# Patient Record
Sex: FEMALE | Race: WHITE | Employment: OTHER | URBAN - METROPOLITAN AREA
[De-identification: names, ages, dates, MRNs, and addresses within clinical notes are randomized per-mention and may not be internally consistent; named-entity substitution may affect disease eponyms.]

---

## 2017-02-14 RX ORDER — TRAZODONE HYDROCHLORIDE 150 MG/1
TABLET ORAL
Qty: 90 TAB | Refills: 1 | Status: SHIPPED | OUTPATIENT
Start: 2017-02-14 | End: 2017-08-13 | Stop reason: SDUPTHER

## 2017-03-30 RX ORDER — ATORVASTATIN CALCIUM 10 MG/1
TABLET, FILM COATED ORAL
Qty: 90 TAB | Refills: 0 | Status: SHIPPED | OUTPATIENT
Start: 2017-03-30 | End: 2017-06-28 | Stop reason: SDUPTHER

## 2017-05-10 ENCOUNTER — OFFICE VISIT (OUTPATIENT)
Dept: INTERNAL MEDICINE CLINIC | Age: 67
End: 2017-05-10

## 2017-05-10 VITALS
DIASTOLIC BLOOD PRESSURE: 82 MMHG | OXYGEN SATURATION: 98 % | WEIGHT: 130.6 LBS | BODY MASS INDEX: 24.03 KG/M2 | HEIGHT: 62 IN | RESPIRATION RATE: 16 BRPM | SYSTOLIC BLOOD PRESSURE: 128 MMHG | TEMPERATURE: 98.6 F | HEART RATE: 68 BPM

## 2017-05-10 DIAGNOSIS — L25.9 CONTACT DERMATITIS, UNSPECIFIED CONTACT DERMATITIS TYPE, UNSPECIFIED TRIGGER: ICD-10-CM

## 2017-05-10 DIAGNOSIS — F41.0 ANXIETY ATTACK: ICD-10-CM

## 2017-05-10 DIAGNOSIS — L25.9 CONTACT DERMATITIS, UNSPECIFIED CONTACT DERMATITIS TYPE, UNSPECIFIED TRIGGER: Primary | ICD-10-CM

## 2017-05-10 RX ORDER — CLOTRIMAZOLE AND BETAMETHASONE DIPROPIONATE 10; .64 MG/G; MG/G
CREAM TOPICAL 2 TIMES DAILY
Qty: 15 G | Refills: 0 | Status: SHIPPED | OUTPATIENT
Start: 2017-05-10 | End: 2017-07-19 | Stop reason: ALTCHOICE

## 2017-05-10 RX ORDER — CLOTRIMAZOLE AND BETAMETHASONE DIPROPIONATE 10; .64 MG/G; MG/G
CREAM TOPICAL 2 TIMES DAILY
Qty: 15 G | Refills: 0 | Status: SHIPPED | OUTPATIENT
Start: 2017-05-10 | End: 2017-05-10 | Stop reason: SDUPTHER

## 2017-05-10 RX ORDER — ALPRAZOLAM 0.5 MG/1
0.5 TABLET ORAL
Qty: 15 TAB | Refills: 0 | Status: SHIPPED | OUTPATIENT
Start: 2017-05-10 | End: 2018-12-18

## 2017-05-10 NOTE — MR AVS SNAPSHOT
Visit Information Date & Time Provider Department Dept. Phone Encounter #  
 5/10/2017  2:30 PM Ness Manuel MD [facility] Medicine [phone] [id number] Upcoming Health Maintenance Date Due  
 GLAUCOMA SCREENING Q2Y 1/4/2017 Pneumococcal 65+ Low/Medium Risk (1 of 2 - PCV13) 1/4/2017 MEDICARE YEARLY EXAM 1/4/2017 INFLUENZA AGE 9 TO ADULT 8/1/2017 BREAST CANCER SCRN MAMMOGRAM 7/15/2018 DTaP/Tdap/Td series (2 - Td) 7/24/2023 Allergies as of 5/10/2017  Review Complete On: 5/10/2017 By: Ness Manuel MD  
 No Known Allergies Current Immunizations  Reviewed on 10/8/2014 Name Date Influenza Vaccine (Quad) PF 10/8/2014 10:00 AM  
 Tdap 7/24/2013  9:56 AM  
 Zoster Vaccine, Live 5/27/2014 Not reviewed this visit You Were Diagnosed With   
  
 Codes Comments Contact dermatitis, unspecified contact dermatitis type, unspecified trigger    -  Primary ICD-10-CM: L25.9 ICD-9-CM: 692.9 Anxiety attack     ICD-10-CM: F41.0 ICD-9-CM: 300.01 Vitals BP Pulse Temp Resp Height(growth percentile) Weight(growth percentile) 128/82 (BP 1 Location: Left arm, BP Patient Position: Sitting) 68 98.6 °F (37 °C) (Oral) 16 5' 2\" (1.575 m) 130 lb 9.6 oz (59.2 kg) SpO2 BMI OB Status Smoking Status 98% 23.89 kg/m2 Postmenopausal Never Smoker BMI and BSA Data Body Mass Index Body Surface Area  
 23.89 kg/m 2 1.61 m 2 Preferred Pharmacy Pharmacy Name Phone [address] [facility] [phone] Your Updated Medication List  
  
   
This list is accurate as of: 5/10/17  8:38 PM.  Always use your most recent med list.  
  
  
  
  
 ALPRAZolam 0.5 mg tablet Commonly known as:  Angelica Barks Take 1 Tab by mouth nightly as needed for Anxiety for up to 15 doses. Max Daily Amount: 0.5 mg.  
  
 aspirin delayed-release 81 mg tablet Take 81 mg by mouth daily. atorvastatin 10 mg tablet Commonly known as:  LIPITOR  
TAKE 1 TABLET DAILY  
  
 calcium 500 mg Tab Take 2 Tabs by mouth daily. clotrimazole-betamethasone topical cream  
Commonly known as:  Susy Arceo Apply  to affected area two (2) times a day. FISH OIL 1,200-144-216 mg Cap Generic drug:  fish oil-dha-epa Take 2 Caps by mouth daily. traZODone 150 mg tablet Commonly known as:  DESYREL  
TAKE 1 TABLET NIGHTLY  
  
 VITAMIN D3 1,000 unit Cap Generic drug:  cholecalciferol Take 1 Tab by mouth daily. Prescriptions Printed Refills ALPRAZolam (XANAX) 0.5 mg tablet 0 Sig: Take 1 Tab by mouth nightly as needed for Anxiety for up to 15 doses. Max Daily Amount: 0.5 mg.  
 Class: Print Route: Oral  
  
Prescriptions Sent to Pharmacy Refills  
 clotrimazole-betamethasone (LOTRISONE) topical cream (Discontinued) 0 Sig: Apply  to affected area two (2) times a day. Class: Normal  
 Pharmacy: [address], [address] Ph #: [phone] Route: Topical  
 Reason for Discontinue: Reorder Introducing [facility]! Dear Celina Allen: Thank you for requesting a Urjanet account. Our records indicate that you already have an active Urjanet account. You can access your account anytime at [email or web address]. [email or web address] Did you know that you can access your hospital and ER discharge instructions at any time in Urjanet? You can also review all of your test results from your hospital stay or ER visit. Additional Information If you have questions, please visit the Frequently Asked Questions section of the Urjanet website at [email or web address] Remember, Urjanet is NOT to be used for urgent needs. For medical emergencies, dial 911. Now available from your iPhone and Android! Please provide this summary of care documentation to your next provider. Your primary care clinician is listed as Collin Ly. If you have any questions after today's visit, please call [phone].

## 2017-06-28 RX ORDER — ATORVASTATIN CALCIUM 10 MG/1
TABLET, FILM COATED ORAL
Qty: 90 TAB | Refills: 1 | Status: SHIPPED | OUTPATIENT
Start: 2017-06-28 | End: 2018-07-25 | Stop reason: SDUPTHER

## 2017-07-19 ENCOUNTER — OFFICE VISIT (OUTPATIENT)
Dept: INTERNAL MEDICINE CLINIC | Age: 67
End: 2017-07-19

## 2017-07-19 VITALS
WEIGHT: 132 LBS | SYSTOLIC BLOOD PRESSURE: 118 MMHG | OXYGEN SATURATION: 97 % | HEART RATE: 82 BPM | BODY MASS INDEX: 24.29 KG/M2 | HEIGHT: 62 IN | RESPIRATION RATE: 16 BRPM | DIASTOLIC BLOOD PRESSURE: 74 MMHG | TEMPERATURE: 97.9 F

## 2017-07-19 DIAGNOSIS — R21 RASH AND NONSPECIFIC SKIN ERUPTION: ICD-10-CM

## 2017-07-19 DIAGNOSIS — Z00.00 PHYSICAL EXAM: Primary | ICD-10-CM

## 2017-07-19 RX ORDER — DESONIDE 0.5 MG/G
OINTMENT TOPICAL 2 TIMES DAILY
Qty: 15 G | Refills: 0 | Status: SHIPPED | OUTPATIENT
Start: 2017-07-19 | End: 2020-06-15

## 2017-07-19 NOTE — PROGRESS NOTES
Chief Complaint   Patient presents with    Complete Physical     patient is fasting and has not had blood work done yet.         Has a place on the corner of the lip on the left side that just doesn't go away, crusty,with white blister and was using a steroid ointment but that was in 2014

## 2017-07-19 NOTE — PROGRESS NOTES
Written by Dee Rios, as dictated by Dr. Shashi Conteh MD.    Phil Noe is a 74 y.o. female. HPI  The patient comes in today for a complete physical examination. She is compliant on Trazodone every night, usually 100 mg but sometimes goes up to 150 mg or down to 75 mg. Her last DEXA scan was last year, and she is osteopenic, but she does not take Ca supplements. She has a hx of normal pap smears and she is getting her mammogram today. She did not get a flu shot last season. She is also following with her eye doctor today. She denies seasonal allergies, constipation, heartburn, urinary issues. Her last colonoscopy was this year, and she has gone every 5 years since her 35s due to her hx of polyps. She has a rash on her lip, which she has followed with derm for. She was given desonide cream, which has lost effectiveness over the years. Patient Active Problem List   Diagnosis Code    Osteopenia M85.80    Arthritis M19.90    Depression F32.9    Hyperlipidemia E78.5        Current Outpatient Prescriptions on File Prior to Visit   Medication Sig Dispense Refill    atorvastatin (LIPITOR) 10 mg tablet TAKE 1 TABLET DAILY 90 Tab 1    traZODone (DESYREL) 150 mg tablet TAKE 1 TABLET NIGHTLY 90 Tab 1    aspirin delayed-release 81 mg tablet Take 81 mg by mouth daily.  calcium 500 mg Tab Take 2 Tabs by mouth daily.  Cholecalciferol, Vitamin D3, (VITAMIN D) 1,000 unit Cap Take 1 Tab by mouth daily.  fish oil-dha-epa (FISH OIL) 1,200-144-216 mg Cap Take 2 Caps by mouth daily.  ALPRAZolam (XANAX) 0.5 mg tablet Take 1 Tab by mouth nightly as needed for Anxiety for up to 15 doses. Max Daily Amount: 0.5 mg. 15 Tab 0     No current facility-administered medications on file prior to visit.         No Known Allergies    Past Medical History:   Diagnosis Date    Arthritis     Hypercholesterolemia        Past Surgical History:   Procedure Laterality Date    ENDOSCOPY, COLON, DIAGNOSTIC  3/7/2012/    repeat in 5 years due family hx    HX GYN          HX ORTHOPAEDIC      rotator cuff tear       Family History   Problem Relation Age of Onset    Hypertension Mother     Stroke Mother     Cancer Father      leukemia       Social History     Social History    Marital status:      Spouse name: N/A    Number of children: N/A    Years of education: N/A     Occupational History    Not on file. Social History Main Topics    Smoking status: Never Smoker    Smokeless tobacco: Never Used    Alcohol use Yes      Comment: socially    Drug use: No    Sexual activity: Yes     Partners: Male     Other Topics Concern    Not on file     Social History Narrative           Review of Systems   Constitutional: Negative for malaise/fatigue. HENT: Negative for congestion. Eyes: Negative for blurred vision and pain. Respiratory: Negative for cough and shortness of breath. Cardiovascular: Negative for chest pain and palpitations. Gastrointestinal: Negative for abdominal pain and heartburn. Genitourinary: Negative for frequency and urgency. Musculoskeletal: Negative for joint pain and myalgias. Neurological: Negative for dizziness, tingling, sensory change, weakness and headaches. Psychiatric/Behavioral: Negative for depression, memory loss and substance abuse. Visit Vitals    /74 (BP 1 Location: Right arm, BP Patient Position: Sitting)    Pulse 82    Temp 97.9 °F (36.6 °C) (Oral)    Resp 16    Ht 5' 2\" (1.575 m)    Wt 132 lb (59.9 kg)    SpO2 97%    BMI 24.14 kg/m2       Physical Exam   Constitutional: She is oriented to person, place, and time. She appears well-developed and well-nourished. No distress. HENT:   Right Ear: External ear normal.   Left Ear: External ear normal.   Eyes: Conjunctivae and EOM are normal. Right eye exhibits no discharge. Left eye exhibits no discharge. Neck: Normal range of motion. Neck supple. Cardiovascular: Normal rate and regular rhythm. Pulses:       Dorsalis pedis pulses are 2+ on the right side, and 2+ on the left side. Pulmonary/Chest: Effort normal and breath sounds normal. She has no wheezes. Abdominal: Soft. Bowel sounds are normal. There is no tenderness. Musculoskeletal:   BL knee crepitus, more on right   Lymphadenopathy:     She has no cervical adenopathy. Neurological: She is alert and oriented to person, place, and time. Reflex Scores:       Patellar reflexes are 0 on the right side and 0 on the left side. Skin: She is not diaphoretic. Upper lip slight erythema   Psychiatric: She has a normal mood and affect. Her behavior is normal.   Nursing note and vitals reviewed. I refilled her desonide for her lip. I referred her to dermatology  ASSESSMENT and PLAN    ICD-10-CM ICD-9-CM    1. Physical exam Z00.00 V70.9 TSH 3RD GENERATION      METABOLIC PANEL, COMPREHENSIVE      CBC W/O DIFF      LIPID PANEL   2. Rash and nonspecific skin eruption R21 782.1 desonide (DESOWEN) 0.05 % topical ointment      This plan was reviewed with the patient and patient agrees. All questions were answered. This scribe documentation was reviewed by me and accurately reflects the examination and decisions made by me.

## 2017-07-20 LAB
ALBUMIN SERPL-MCNC: 4.6 G/DL (ref 3.6–4.8)
ALBUMIN/GLOB SERPL: 2 {RATIO} (ref 1.2–2.2)
ALP SERPL-CCNC: 86 IU/L (ref 39–117)
ALT SERPL-CCNC: 21 IU/L (ref 0–32)
AST SERPL-CCNC: 25 IU/L (ref 0–40)
BILIRUB SERPL-MCNC: 1.9 MG/DL (ref 0–1.2)
BUN SERPL-MCNC: 13 MG/DL (ref 8–27)
BUN/CREAT SERPL: 18 (ref 12–28)
CALCIUM SERPL-MCNC: 9.2 MG/DL (ref 8.7–10.3)
CHLORIDE SERPL-SCNC: 101 MMOL/L (ref 96–106)
CHOLEST SERPL-MCNC: 177 MG/DL (ref 100–199)
CO2 SERPL-SCNC: 26 MMOL/L (ref 18–29)
CREAT SERPL-MCNC: 0.72 MG/DL (ref 0.57–1)
ERYTHROCYTE [DISTWIDTH] IN BLOOD BY AUTOMATED COUNT: 13.5 % (ref 12.3–15.4)
GLOBULIN SER CALC-MCNC: 2.3 G/DL (ref 1.5–4.5)
GLUCOSE SERPL-MCNC: 94 MG/DL (ref 65–99)
HCT VFR BLD AUTO: 44.1 % (ref 34–46.6)
HDLC SERPL-MCNC: 66 MG/DL
HGB BLD-MCNC: 14.6 G/DL (ref 11.1–15.9)
INTERPRETATION, 910389: NORMAL
LDLC SERPL CALC-MCNC: 94 MG/DL (ref 0–99)
MCH RBC QN AUTO: 31.7 PG (ref 26.6–33)
MCHC RBC AUTO-ENTMCNC: 33.1 G/DL (ref 31.5–35.7)
MCV RBC AUTO: 96 FL (ref 79–97)
PLATELET # BLD AUTO: 205 X10E3/UL (ref 150–379)
POTASSIUM SERPL-SCNC: 4.4 MMOL/L (ref 3.5–5.2)
PROT SERPL-MCNC: 6.9 G/DL (ref 6–8.5)
RBC # BLD AUTO: 4.6 X10E6/UL (ref 3.77–5.28)
SODIUM SERPL-SCNC: 143 MMOL/L (ref 134–144)
TRIGL SERPL-MCNC: 84 MG/DL (ref 0–149)
TSH SERPL DL<=0.005 MIU/L-ACNC: 1.82 UIU/ML (ref 0.45–4.5)
VLDLC SERPL CALC-MCNC: 17 MG/DL (ref 5–40)
WBC # BLD AUTO: 4.1 X10E3/UL (ref 3.4–10.8)

## 2017-08-14 RX ORDER — CHLORPHENIRAMINE MALEATE 4 MG
TABLET ORAL 2 TIMES DAILY
Qty: 15 G | Refills: 0 | Status: SHIPPED | OUTPATIENT
Start: 2017-08-14 | End: 2020-06-15

## 2017-08-14 RX ORDER — CLOTRIMAZOLE AND BETAMETHASONE DIPROPIONATE 10; .64 MG/G; MG/G
CREAM TOPICAL 2 TIMES DAILY
Qty: 45 G | Refills: 2 | OUTPATIENT
Start: 2017-08-14

## 2017-08-14 RX ORDER — TRAZODONE HYDROCHLORIDE 150 MG/1
TABLET ORAL
Qty: 90 TAB | Refills: 0 | Status: SHIPPED | OUTPATIENT
Start: 2017-08-14 | End: 2018-02-12 | Stop reason: SDUPTHER

## 2017-08-14 NOTE — TELEPHONE ENCOUNTER
She lost her bottle but last refill was back in may. I gave her refills as she has a horrible rash which this med helped clear it up.

## 2017-08-16 ENCOUNTER — OFFICE VISIT (OUTPATIENT)
Dept: DERMATOLOGY | Facility: AMBULATORY SURGERY CENTER | Age: 67
End: 2017-08-16

## 2017-08-16 VITALS
BODY MASS INDEX: 24.29 KG/M2 | DIASTOLIC BLOOD PRESSURE: 75 MMHG | OXYGEN SATURATION: 98 % | HEART RATE: 72 BPM | WEIGHT: 132 LBS | SYSTOLIC BLOOD PRESSURE: 120 MMHG | HEIGHT: 62 IN | TEMPERATURE: 98.6 F | RESPIRATION RATE: 17 BRPM

## 2017-08-16 DIAGNOSIS — D18.01 CHERRY ANGIOMA: ICD-10-CM

## 2017-08-16 DIAGNOSIS — L82.1 SEBORRHEIC KERATOSES: ICD-10-CM

## 2017-08-16 DIAGNOSIS — D22.9 MULTIPLE BENIGN NEVI: Primary | ICD-10-CM

## 2017-08-16 NOTE — PROGRESS NOTES
Name: Mohan Platt       Age: 74 y.o. Date: 8/16/2017    Chief Complaint:   Chief Complaint   Patient presents with    Skin Exam     spots over the enitre body       Subjective:    HPI  Ms. Mohan Paltt is a 74 y.o. female who presents as a new patient to [place] [address] for a skin exam.  The patient was referred for this evaluation by Dr. Cleo Knott. The patient has had a skin exam in the past (about 10 years ago) and the patient does have current complaints related to her skin. She reports numerous growths on her abdomen and under each breast that is been present for many months. She does not have any associated symptoms. She does utilize sun protective behaviors. Additionally, she reports some lesions in her scalp that she will follow periodically. She denies any persistent lesions and none with symptoms associated. The patient's pertinent skin history includes: She reports no personal or family history of skin cancer but has had blistering sunburns in the past.    ROS: Constitutional: Negative. Dermatological : positive for - skin lesion changes      Social History     Social History    Marital status:      Spouse name: N/A    Number of children: N/A    Years of education: N/A     Occupational History    Not on file.      Social History Main Topics    Smoking status: Never Smoker    Smokeless tobacco: Never Used    Alcohol use Yes      Comment: socially    Drug use: No    Sexual activity: Yes     Partners: Male     Other Topics Concern    Not on file     Social History Narrative       Family History   Problem Relation Age of Onset    Hypertension Mother     Stroke Mother     Cancer Father      leukemia       Past Medical History:   Diagnosis Date    Arthritis     Hypercholesterolemia     Sun-damaged skin     Sunburn, blistering        Past Surgical History:   Procedure Laterality Date    ENDOSCOPY, COLON, DIAGNOSTIC  3/7/2012/2017    repeat in 5 years due family hx    HX GYN          HX ORTHOPAEDIC      rotator cuff tear       Current Outpatient Prescriptions   Medication Sig Dispense Refill    traZODone (DESYREL) 150 mg tablet TAKE 1 TABLET NIGHTLY 90 Tab 0    clotrimazole (LOTRIMIN) 1 % topical cream Apply  to affected area two (2) times a day. 15 g 0    desonide (DESOWEN) 0.05 % topical ointment Apply  to affected area two (2) times a day. 15 g 0    atorvastatin (LIPITOR) 10 mg tablet TAKE 1 TABLET DAILY 90 Tab 1    ALPRAZolam (XANAX) 0.5 mg tablet Take 1 Tab by mouth nightly as needed for Anxiety for up to 15 doses. Max Daily Amount: 0.5 mg. 15 Tab 0    aspirin delayed-release 81 mg tablet Take 81 mg by mouth daily.  calcium 500 mg Tab Take 2 Tabs by mouth daily.  Cholecalciferol, Vitamin D3, (VITAMIN D) 1,000 unit Cap Take 1 Tab by mouth daily.  fish oil-dha-epa (FISH OIL) 1,200-144-216 mg Cap Take 2 Caps by mouth daily. No Known Allergies      Objective:    Visit Vitals    /75 (BP 1 Location: Right arm, BP Patient Position: Sitting)    Pulse 72    Temp 98.6 °F (37 °C) (Oral)    Resp 17    Ht 5' 2\" (1.575 m)    Wt 59.9 kg (132 lb)    SpO2 98%    BMI 24.14 kg/m2       Brenda Mariee is a 74 y.o. female who appears well and in no distress. She is awake, alert, and oriented. There is no preauricular, submandibular, or cervical lymphadenopathy. A skin examination was performed including her scalp, face (including eyelid), ears, neck, chest, back, abdomen, upper extremities (including digits/nails), lower extremities, breasts, buttocks; genital skin was not examined. She has fair skin but is very well sun protected. There are scattered stuck on waxy macules and keratotic papules consistent with seborrheic keratoses. She is open comedone on the left cheek. There are scattered cherry angiomas. She has medium brown junctional nevi and a few pink intradermal nevi without concerning features. Overall she has minimal sun damage. She is without concerning lesions for skin cancers on today's exam.      Assessment/Plan: 1. Normal nevi. The diagnosis of normal nevi was reviewed. I discussed sun protection, sunscreen use, the warning signs of skin cancer, the need for self-skin examinations, and the need for regular practitioner exams every few years. The patient should follow up sooner as needed if new, changing, or symptomatic skin lesions arise. 2. Seborrheic keratoses. The diagnosis was reviewed and the patient was reassured that no treatment is needed for these benign lesions. 3.Cherry angiomas. The diagnosis was reviewed and the patient was reassured that no treatment is needed for these benign lesions.

## 2017-08-16 NOTE — PROGRESS NOTES
Chief Complaint   Patient presents with    Skin Exam     spots over the enitre body     1. Have you been to the ER, urgent care clinic since your last visit? Hospitalized since your last visit? No    2. Have you seen or consulted any other health care providers outside of the [address] [place] [address] since your last visit? Include any pap smears or colon screening.  No

## 2018-07-25 RX ORDER — ATORVASTATIN CALCIUM 10 MG/1
TABLET, FILM COATED ORAL
Qty: 90 TAB | Refills: 1 | Status: SHIPPED | OUTPATIENT
Start: 2018-07-25 | End: 2018-11-14 | Stop reason: SDUPTHER

## 2018-10-11 ENCOUNTER — TELEPHONE (OUTPATIENT)
Dept: PRIMARY CARE CLINIC | Age: 68
End: 2018-10-11

## 2018-10-11 ENCOUNTER — OFFICE VISIT (OUTPATIENT)
Dept: PRIMARY CARE CLINIC | Age: 68
End: 2018-10-11

## 2018-10-11 VITALS
RESPIRATION RATE: 16 BRPM | WEIGHT: 133 LBS | TEMPERATURE: 98.1 F | SYSTOLIC BLOOD PRESSURE: 135 MMHG | HEART RATE: 91 BPM | DIASTOLIC BLOOD PRESSURE: 89 MMHG | HEIGHT: 62 IN | BODY MASS INDEX: 24.48 KG/M2

## 2018-10-11 DIAGNOSIS — E78.2 MIXED HYPERLIPIDEMIA: Primary | ICD-10-CM

## 2018-10-11 DIAGNOSIS — Z23 ENCOUNTER FOR IMMUNIZATION: ICD-10-CM

## 2018-10-11 DIAGNOSIS — R00.2 INTERMITTENT PALPITATIONS: Primary | ICD-10-CM

## 2018-10-11 DIAGNOSIS — M19.90 ARTHRITIS: ICD-10-CM

## 2018-10-11 DIAGNOSIS — E78.2 MIXED HYPERLIPIDEMIA: ICD-10-CM

## 2018-10-11 DIAGNOSIS — R00.2 INTERMITTENT PALPITATIONS: ICD-10-CM

## 2018-10-11 DIAGNOSIS — F32.A MILD DEPRESSION: ICD-10-CM

## 2018-10-11 DIAGNOSIS — F51.01 PRIMARY INSOMNIA: ICD-10-CM

## 2018-10-11 NOTE — PROGRESS NOTES
Written by Salo Zamora, as dictated by Dr. Chi Mederos MD. 
 
[address] [facility] Reilly Cordova is a 79 y.o. female. HPI The patient comes in today for a follow-up. For the past week she has noticed that for the past 1.5 weeks she has been waking up early with palpitations, which last for several hours. She attributes the palpitations to her anxiety and notes that she previously had similar palpitations with anxiety. The patient is worried about her  because he has no energy and is sleeping a lot. She is very stressed because she is taking care of everyone and she is struggling not to become depressed. She would like to know what she can do to help her  as well as herself. Her last Holter and EKG were in 2009. BP is good today at 135/89. She notes that she has been checking her BP at home and brought her readings in today. Some on her readings have been on the higher side systolic and her diastolic readings have been high. She notes that she goes to a parent grieving group at her [employer], which she finds helpful as there are other people who are able to understand the loss of a child. She is reluctant to take medication at this time. The patient notes that she is starting to get back into an exercise regimen. She is taking Trazodone with relief from insomnia. She notes that she is getting 6-7 hours of sleep, but she has been waking up early. She is fasting for labs today. She denies constipation, urinary issues. Patient notes that she is experiencing some joint aches and pains. Patient Active Problem List  
Diagnosis Code  Osteopenia M85.80  Arthritis M19.90  
 Hyperlipidemia E78.5  Mild depression (HCC) F32.0 Current Outpatient Prescriptions on File Prior to Visit Medication Sig Dispense Refill  traZODone (DESYREL) 150 mg tablet TAKE 1 TABLET NIGHTLY (NEED APPOINTMENT FOR ADDITIONAL REFILLS FOR CHRONIC MEDICAL CARE MANAGEMENT) 30 Tab 0  
  atorvastatin (LIPITOR) 10 mg tablet TAKE 1 TABLET DAILY 90 Tab 1  
 aspirin delayed-release 81 mg tablet Take 81 mg by mouth daily.  calcium 500 mg Tab Take 2 Tabs by mouth daily.  Cholecalciferol, Vitamin D3, (VITAMIN D) 1,000 unit Cap Take 1 Tab by mouth daily.  fish oil-dha-epa (FISH OIL) 1,200-144-216 mg Cap Take 2 Caps by mouth daily.  clotrimazole (LOTRIMIN) 1 % topical cream Apply  to affected area two (2) times a day. 15 g 0  
 desonide (DESOWEN) 0.05 % topical ointment Apply  to affected area two (2) times a day. 15 g 0  
 ALPRAZolam (XANAX) 0.5 mg tablet Take 1 Tab by mouth nightly as needed for Anxiety for up to 15 doses. Max Daily Amount: 0.5 mg. 15 Tab 0 No current facility-administered medications on file prior to visit. Past Medical History:  
Diagnosis Date  Arthritis  Hypercholesterolemia  Sun-damaged skin  Sunburn, blistering Past Surgical History:  
Procedure Laterality Date  ENDOSCOPY, COLON, DIAGNOSTIC  3/7/2012/  
 repeat in 5 years due family hx  HX GYN    
   HX ORTHOPAEDIC    
 rotator cuff tear Family History Problem Relation Age of Onset  Hypertension Mother  Stroke Mother  Cancer Father   
  leukemia Social History Social History  Marital status:  Spouse name: N/A  
 Number of children: N/A  
 Years of education: N/A Occupational History  Not on file. Social History Main Topics  Smoking status: Never Smoker  Smokeless tobacco: Never Used  Alcohol use Yes Comment: socially  Drug use: No  
 Sexual activity: Yes  
  Partners: Male Other Topics Concern  Not on file Social History Narrative Review of Systems Constitutional: Negative for malaise/fatigue. HENT: Negative for congestion. Eyes: Negative for blurred vision and pain. Respiratory: Negative for cough and shortness of breath. Cardiovascular: Positive for palpitations. Negative for chest pain. Gastrointestinal: Negative for abdominal pain and heartburn. Genitourinary: Negative for frequency and urgency. Musculoskeletal: Positive for joint pain. Negative for myalgias. Neurological: Negative for dizziness, tingling, sensory change, weakness and headaches. Psychiatric/Behavioral: Positive for depression. Negative for memory loss and substance abuse. The patient has insomnia. Visit Vitals  /89 (BP 1 Location: Left arm, BP Patient Position: Sitting)  Pulse 91  Temp 98.1 °F (36.7 °C) (Oral)  Resp 16  
 Ht 5' 2\" (1.575 m)  Wt 133 lb (60.3 kg)  BMI 24.33 kg/m2 Physical Exam  
Constitutional: She is oriented to person, place, and time. She appears well-developed and well-nourished. No distress. HENT:  
Right Ear: External ear normal.  
Left Ear: External ear normal.  
Eyes: Conjunctivae and EOM are normal. Right eye exhibits no discharge. Left eye exhibits no discharge. Neck: Normal range of motion. Neck supple. Cardiovascular: Normal rate, regular rhythm and normal heart sounds. Pulses: 
     Dorsalis pedis pulses are 2+ on the right side, and 2+ on the left side. Pulmonary/Chest: Effort normal and breath sounds normal. She has no wheezes. Abdominal: Soft. Bowel sounds are normal. There is no tenderness. Musculoskeletal:  
BL knee crepitus Lymphadenopathy:  
  She has no cervical adenopathy. Neurological: She is alert and oriented to person, place, and time. Skin: She is not diaphoretic. Psychiatric: She has a normal mood and affect. Her behavior is normal.  
Nursing note and vitals reviewed. ASSESSMENT and PLAN 
  ICD-10-CM ICD-9-CM 1. Mixed hyperlipidemia E78.2 272.2 CBC W/O DIFF  
   METABOLIC PANEL, COMPREHENSIVE  
   LIPID PANEL  
   TSH 3RD GENERATION 
 
CBC, CMP, and Lipid panel ordered. Compliant on Lipitor. 2. Primary insomnia F51.01 307.42 Compliant on Trazodone with relief. 3. Mild depression ([facility] [address] F32.0 311 She does not want medication at this time. She is going to a grieving group. 4. Arthritis M19.90 716.90 No treatment given at this time. She should take OTC medication. 5. Intermittent palpitations R00.2 785. 1 ECG HOLTER MONITOR, UP TO 48 HRS Holter monitor ordered. I would like her to have a heart CT to determine her calcium score. If her BP is still running high, she should make a follow-up appointment and I will prescribed clonidine to take as needed. Influenza vaccine given in office. This plan was reviewed with the patient and patient agrees. All questions were answered. This scribe documentation was reviewed by me and accurately reflects the examination and decisions made by me. This note will not be viewable in [address]

## 2018-10-11 NOTE — TELEPHONE ENCOUNTER
Pt called stating that she went downstairs to get a heart scan and she needs an order put in so she can get it scheduled.

## 2018-10-11 NOTE — MR AVS SNAPSHOT
[address] [place] [address] 57 
[phone] Patient: Letty Munoz MRN: [id number] Visit Information Date & Time Provider Department Dept. Phone Encounter #  
 10/11/2018  9:45 AM Osman Lane MD [facility] 2. [phone] [id number] Upcoming Health Maintenance Date Due Shingrix Vaccine Age 50> (1 of 2) 1/4/2002 GLAUCOMA SCREENING Q2Y 1/4/2017 BREAST CANCER SCRN MAMMOGRAM 7/15/2018 Influenza Age 5 to Adult 8/1/2018 Pneumococcal 65+ Low/Medium Risk (2 of 2 - PPSV23) 8/16/2018 DTaP/Tdap/Td series (2 - Td) 7/24/2023 Allergies as of 10/11/2018  Review Complete On: 10/11/2018 By: Osman Lane MD  
 No Known Allergies Current Immunizations  Reviewed on 8/18/2017 Name Date Influenza Vaccine 8/16/2017 Influenza Vaccine (Quad) PF 10/8/2014 10:00 AM  
 Pneumococcal Conjugate (PCV-13) 8/16/2017 Tdap 7/24/2013  9:56 AM  
 Zoster Vaccine, Live 5/27/2014 Not reviewed this visit You Were Diagnosed With   
  
 Codes Comments Mixed hyperlipidemia    -  Primary ICD-10-CM: S96.8 ICD-9-CM: 272.2 Primary insomnia     ICD-10-CM: F51.01 
ICD-9-CM: 307.42 Mild depression (HCC)     ICD-10-CM: F32.0 ICD-9-CM: 940 Arthritis     ICD-10-CM: M19.90 ICD-9-CM: 716.90 Intermittent palpitations     ICD-10-CM: R00.2 ICD-9-CM: 785.1 Vitals BP Pulse Temp Resp Height(growth percentile) Weight(growth percentile) 135/89 (BP 1 Location: Left arm, BP Patient Position: Sitting) 91 98.1 °F (36.7 °C) (Oral) 16 5' 2\" (1.575 m) 133 lb (60.3 kg) BMI OB Status Smoking Status 24.33 kg/m2 Postmenopausal Never Smoker BMI and BSA Data Body Mass Index Body Surface Area  
 24.33 kg/m 2 1.62 m 2 Preferred Pharmacy Pharmacy Name Phone  [facility]ACY #[id number]- [place], [address] [facility] [phone] Your Updated Medication List  
  
   
This list is accurate as of 10/11/18 10:24 AM.  Always use your most recent med list.  
  
  
  
  
 ALPRAZolam 0.5 mg tablet Commonly known as:  Mary Joy Take 1 Tab by mouth nightly as needed for Anxiety for up to 15 doses. Max Daily Amount: 0.5 mg.  
  
 aspirin delayed-release 81 mg tablet Take 81 mg by mouth daily. atorvastatin 10 mg tablet Commonly known as:  LIPITOR  
TAKE 1 TABLET DAILY  
  
 calcium 500 mg Tab Take 2 Tabs by mouth daily. clotrimazole 1 % topical cream  
Commonly known as:  Opal Noun Apply  to affected area two (2) times a day. desonide 0.05 % topical ointment Commonly known as:  Alli Govern Apply  to affected area two (2) times a day. FISH OIL 1,200-144-216 mg Cap Generic drug:  fish oil-dha-epa Take 2 Caps by mouth daily. traZODone 150 mg tablet Commonly known as:  DESYREL  
TAKE 1 TABLET NIGHTLY (NEED APPOINTMENT FOR ADDITIONAL REFILLS FOR CHRONIC MEDICAL CARE MANAGEMENT) VITAMIN D3 1,000 unit Cap Generic drug:  cholecalciferol Take 1 Tab by mouth daily. We Performed the Following CBC W/O DIFF [08604 CPT(R)] LIPID PANEL [02292 CPT(R)] METABOLIC PANEL, COMPREHENSIVE [28897 CPT(R)] TSH 3RD GENERATION [31947 CPT(R)] To-Do List   
 10/11/2018 ECG:  ECG HOLTER MONITOR, UP TO 48 HRS Introducing [facility]! Dear Sage Hamlin: Thank you for requesting a [employer] account. Our records indicate that you already have an active [employer] account. You can access your account anytime at [email or web address] Did you know that you can access your hospital and ER discharge instructions at any time in [employer]? You can also review all of your test results from your hospital stay or ER visit. Additional Information If you have questions, please visit the Frequently Asked Questions section of the [employer] website at [email or web address] Remember, [employer] is NOT to be used for urgent needs. For medical emergencies, dial 911. Now available from your iPhone and Android! Please provide this summary of care documentation to your next provider. Your primary care clinician is listed as Vicki Parson. If you have any questions after today's visit, please call [phone].

## 2018-10-11 NOTE — PROGRESS NOTES
Chief Complaint Patient presents with  Complete Physical  
  patient is fasting and would like all shots that are needed

## 2018-10-12 LAB
ALBUMIN SERPL-MCNC: 4.9 G/DL (ref 3.6–4.8)
ALBUMIN/GLOB SERPL: 2 {RATIO} (ref 1.2–2.2)
ALP SERPL-CCNC: 91 IU/L (ref 39–117)
ALT SERPL-CCNC: 16 IU/L (ref 0–32)
AST SERPL-CCNC: 23 IU/L (ref 0–40)
BILIRUB SERPL-MCNC: 1.8 MG/DL (ref 0–1.2)
BUN SERPL-MCNC: 11 MG/DL (ref 8–27)
BUN/CREAT SERPL: 15 (ref 12–28)
CALCIUM SERPL-MCNC: 9.5 MG/DL (ref 8.7–10.3)
CHLORIDE SERPL-SCNC: 101 MMOL/L (ref 96–106)
CHOLEST SERPL-MCNC: 152 MG/DL (ref 100–199)
CO2 SERPL-SCNC: 24 MMOL/L (ref 20–29)
CREAT SERPL-MCNC: 0.75 MG/DL (ref 0.57–1)
ERYTHROCYTE [DISTWIDTH] IN BLOOD BY AUTOMATED COUNT: 13.2 % (ref 12.3–15.4)
GLOBULIN SER CALC-MCNC: 2.4 G/DL (ref 1.5–4.5)
GLUCOSE SERPL-MCNC: 95 MG/DL (ref 65–99)
HCT VFR BLD AUTO: 43.6 % (ref 34–46.6)
HDLC SERPL-MCNC: 63 MG/DL
HGB BLD-MCNC: 14.7 G/DL (ref 11.1–15.9)
LDLC SERPL CALC-MCNC: 78 MG/DL (ref 0–99)
MCH RBC QN AUTO: 31.7 PG (ref 26.6–33)
MCHC RBC AUTO-ENTMCNC: 33.7 G/DL (ref 31.5–35.7)
MCV RBC AUTO: 94 FL (ref 79–97)
PLATELET # BLD AUTO: 204 X10E3/UL (ref 150–379)
POTASSIUM SERPL-SCNC: 4.2 MMOL/L (ref 3.5–5.2)
PROT SERPL-MCNC: 7.3 G/DL (ref 6–8.5)
RBC # BLD AUTO: 4.63 X10E6/UL (ref 3.77–5.28)
SODIUM SERPL-SCNC: 142 MMOL/L (ref 134–144)
TRIGL SERPL-MCNC: 56 MG/DL (ref 0–149)
TSH SERPL DL<=0.005 MIU/L-ACNC: 1.1 UIU/ML (ref 0.45–4.5)
VLDLC SERPL CALC-MCNC: 11 MG/DL (ref 5–40)
WBC # BLD AUTO: 4.3 X10E3/UL (ref 3.4–10.8)

## 2018-10-17 ENCOUNTER — HOSPITAL ENCOUNTER (OUTPATIENT)
Dept: CT IMAGING | Age: 68
Discharge: HOME | End: 2018-10-17
Attending: INTERNAL MEDICINE
Payer: SELF-PAY

## 2018-10-17 DIAGNOSIS — E78.2 MIXED HYPERLIPIDEMIA: ICD-10-CM

## 2018-10-17 DIAGNOSIS — R00.2 INTERMITTENT PALPITATIONS: ICD-10-CM

## 2018-10-17 PROCEDURE — 75571 CT HRT W/O DYE W/CA TEST: CPT

## 2018-10-18 NOTE — PROGRESS NOTES
Rc Reasoner, your calcium score came back 30 which is mild coronary artery disease. I would suggest continuing Lipitor.

## 2018-10-22 ENCOUNTER — HOSPITAL ENCOUNTER (OUTPATIENT)
Dept: NON INVASIVE DIAGNOSTICS | Age: 68
Discharge: HOME | End: 2018-10-22
Attending: INTERNAL MEDICINE
Payer: MEDICARE

## 2018-10-22 DIAGNOSIS — R00.2 INTERMITTENT PALPITATIONS: ICD-10-CM

## 2018-10-22 PROCEDURE — 93225 XTRNL ECG REC<48 HRS REC: CPT

## 2018-10-23 ENCOUNTER — OFFICE VISIT (OUTPATIENT)
Dept: PRIMARY CARE CLINIC | Age: 68
End: 2018-10-23

## 2018-10-23 VITALS
WEIGHT: 137.2 LBS | RESPIRATION RATE: 16 BRPM | OXYGEN SATURATION: 99 % | BODY MASS INDEX: 25.25 KG/M2 | HEART RATE: 70 BPM | SYSTOLIC BLOOD PRESSURE: 128 MMHG | TEMPERATURE: 97.8 F | DIASTOLIC BLOOD PRESSURE: 84 MMHG | HEIGHT: 62 IN

## 2018-10-23 DIAGNOSIS — L30.9 ECZEMA OF LOWER LEG: Primary | ICD-10-CM

## 2018-10-23 DIAGNOSIS — R00.2 INTERMITTENT PALPITATIONS: ICD-10-CM

## 2018-10-23 RX ORDER — HYDROCORTISONE 25 MG/G
CREAM TOPICAL 2 TIMES DAILY
Qty: 30 G | Refills: 0 | Status: SHIPPED | OUTPATIENT
Start: 2018-10-23 | End: 2018-11-22

## 2018-10-23 NOTE — PROGRESS NOTES
Chief Complaint Patient presents with  
 Skin Problem  
  noticed place on the back of the calf on the left leg.  states that she has had these places before and they resolve on their own but this one is the biggest she has had.  patient sun bathed as a kid with baby oil

## 2018-10-28 DIAGNOSIS — R00.2 INTERMITTENT PALPITATIONS: Primary | ICD-10-CM

## 2018-10-28 NOTE — PROGRESS NOTES
I discussed results with her. I put a referral for cardiology. Please give her Dr. Tom Stantonr office number so she can make an appointment.

## 2018-10-30 ENCOUNTER — OFFICE VISIT (OUTPATIENT)
Dept: CARDIOLOGY CLINIC | Age: 68
End: 2018-10-30

## 2018-10-30 ENCOUNTER — CLINICAL SUPPORT (OUTPATIENT)
Dept: CARDIOLOGY CLINIC | Age: 68
End: 2018-10-30

## 2018-10-30 VITALS
DIASTOLIC BLOOD PRESSURE: 80 MMHG | SYSTOLIC BLOOD PRESSURE: 120 MMHG | BODY MASS INDEX: 25.03 KG/M2 | HEIGHT: 62 IN | HEART RATE: 81 BPM | OXYGEN SATURATION: 98 % | WEIGHT: 136 LBS | RESPIRATION RATE: 16 BRPM

## 2018-10-30 DIAGNOSIS — I49.3 PVC (PREMATURE VENTRICULAR CONTRACTION): ICD-10-CM

## 2018-10-30 DIAGNOSIS — I49.1 PAC (PREMATURE ATRIAL CONTRACTION): ICD-10-CM

## 2018-10-30 DIAGNOSIS — R00.0 RAPID HEART BEAT: ICD-10-CM

## 2018-10-30 DIAGNOSIS — I47.1 PSVT (PAROXYSMAL SUPRAVENTRICULAR TACHYCARDIA) (HCC): ICD-10-CM

## 2018-10-30 DIAGNOSIS — R00.2 INTERMITTENT PALPITATIONS: ICD-10-CM

## 2018-10-30 DIAGNOSIS — R00.0 RAPID HEART BEAT: Primary | ICD-10-CM

## 2018-10-30 NOTE — PATIENT INSTRUCTIONS
A 30 day loop monitor has been ordered for you. This will be mailed to the address given to us. You will be scheduled for an echo    You will need to follow up in clinic with Dr. Paola Lainez in 2 months.

## 2018-10-31 NOTE — PROGRESS NOTES
Cardiac Electrophysiology OFFICE Consultation Note     Subjective:      Elyssa Aldridge is a 79 y.o. patient who is seen for evaluation of  Rapid heart beats over the last several months. The patient had the first palpitation dating back several years ago. she is kindly referred from Dr Femi Rangel office  2-3 hrs of rapid HR in morning  She had 1700 PVC on holter in 2009  Stress echo normal then   holter at [facility] 10/2018 few PAC and 50 PVC 1 run PSVT 7 beat Chelsy Old)  Sx is better in a month  BP is normalized    Patient Active Problem List   Diagnosis Code    Osteopenia M85.80    Arthritis M19.90    Hyperlipidemia E78.5    Mild depression ([address] F32.0     Current Outpatient Medications   Medication Sig Dispense Refill    hydrocortisone (HYTONE) 2.5 % topical cream Apply  to affected area two (2) times a day for 30 days. use thin layer on dry area for 5 days. 30 g 0    traZODone (DESYREL) 150 mg tablet TAKE 1 TABLET NIGHTLY (NEED APPOINTMENT FOR ADDITIONAL REFILLS FOR CHRONIC MEDICAL CARE MANAGEMENT) 30 Tab 0    atorvastatin (LIPITOR) 10 mg tablet TAKE 1 TABLET DAILY 90 Tab 1    clotrimazole (LOTRIMIN) 1 % topical cream Apply  to affected area two (2) times a day. 15 g 0    desonide (DESOWEN) 0.05 % topical ointment Apply  to affected area two (2) times a day. 15 g 0    ALPRAZolam (XANAX) 0.5 mg tablet Take 1 Tab by mouth nightly as needed for Anxiety for up to 15 doses. Max Daily Amount: 0.5 mg. 15 Tab 0    aspirin delayed-release 81 mg tablet Take 81 mg by mouth daily.  calcium 500 mg Tab Take 2 Tabs by mouth daily.  Cholecalciferol, Vitamin D3, (VITAMIN D) 1,000 unit Cap Take 1 Tab by mouth daily.  fish oil-dha-epa (FISH OIL) 1,200-144-216 mg Cap Take 2 Caps by mouth daily.        No Known Allergies  Past Medical History:   Diagnosis Date    Arthritis     Hypercholesterolemia     Sun-damaged skin     Sunburn, blistering      Past Surgical History:   Procedure Laterality Date    ENDOSCOPY, COLON, DIAGNOSTIC  3/7/2012/    repeat in 5 years due family hx    HX GYN          HX ORTHOPAEDIC      rotator cuff tear     Family History   Problem Relation Age of Onset    Hypertension Mother     Stroke Mother     Cancer Father         leukemia     Social History     Tobacco Use    Smoking status: Never Smoker    Smokeless tobacco: Never Used   Substance Use Topics    Alcohol use: Yes     Comment: socially        Review of Systems:   Constitutional: Negative for fever, chills, weight loss, malaise/fatigue. HEENT: Negative for nosebleeds, vision changes. Respiratory: Negative for cough, hemoptysis  Cardiovascular: Negative for chest pain, + palpitations, no orthopnea, claudication, leg swelling, syncope, and PND. Gastrointestinal: Negative for nausea, vomiting, diarrhea, blood in stool and melena. Genitourinary: Negative for dysuria, and hematuria. Musculoskeletal: Negative for myalgias, arthralgia. Skin: Negative for rash. Heme: Does not bleed or bruise easily. Neurological: Negative for speech change and focal weakness     Objective:     Visit Vitals  /80 (BP 1 Location: Left arm, BP Patient Position: Sitting)   Pulse 81   Resp 16   Ht 5' 2\" (1.575 m)   Wt 136 lb (61.7 kg)   SpO2 98%   BMI 24.87 kg/m²      Physical Exam:   Constitutional: well-developed and well-nourished. No respiratory distress. Head: Normocephalic and atraumatic. Eyes: Pupils are equal, round  ENT: hearing normal  Neck: supple. No JVD present. Cardiovascular: Normal rate, regular rhythm. Exam reveals no gallop and no friction rub. No murmur heard. Pulmonary/Chest: Effort normal and breath sounds normal. No wheezes. Abdominal: Soft, no tenderness. Musculoskeletal: no edema. Neurological: alert,oriented. Skin: Skin is warm and dry  Psychiatric: normal mood and affect.  Behavior is normal. Judgment and thought content normal.          Assessment/Plan:       ICD-10-CM ICD-9-CM 1. Rapid heart beat R00.0 785.0 LOOP MONITOR      2D ECHO COMPLETE ADULT (TTE) W OR WO CONTR   2. Intermittent palpitations R00.2 785.1 LOOP MONITOR      2D ECHO COMPLETE ADULT (TTE) W OR WO CONTR   3. PSVT (paroxysmal supraventricular tachycardia) (HCC) I47.1 427.0 LOOP MONITOR      2D ECHO COMPLETE ADULT (TTE) W OR WO CONTR   4. PAC (premature atrial contraction) I49.1 427.61 LOOP MONITOR      2D ECHO COMPLETE ADULT (TTE) W OR WO CONTR   5. PVC (premature ventricular contraction) I49.3 427.69 LOOP MONITOR      2D ECHO COMPLETE ADULT (TTE) W OR WO CONTR     reviewed diet, exercise and weight control  reviewed medications and side effects in detail   Palpitations is concerning for atrial fibrillation. The patient will use the event recorder. I also recommended a 2-D echocardiogram to evaluate the left atrial size and ventricular function. The patient agreed to proceed. She will hold medication at this time to try to record some arrhythmia. Thank you for involving me in this patient's care and please call with further concerns or questions. Dannie Marinelli M.D.   Electrophysiology/Cardiology  [facility] [place]  [address] [place] [address]  [phone]

## 2018-11-06 ENCOUNTER — CLINICAL SUPPORT (OUTPATIENT)
Dept: CARDIOLOGY CLINIC | Age: 68
End: 2018-11-06

## 2018-11-06 DIAGNOSIS — R00.0 RAPID HEART BEAT: ICD-10-CM

## 2018-11-06 DIAGNOSIS — R00.2 INTERMITTENT PALPITATIONS: ICD-10-CM

## 2018-11-06 DIAGNOSIS — I49.3 PVC (PREMATURE VENTRICULAR CONTRACTION): ICD-10-CM

## 2018-11-06 DIAGNOSIS — I49.1 PAC (PREMATURE ATRIAL CONTRACTION): ICD-10-CM

## 2018-11-06 DIAGNOSIS — I47.1 PSVT (PAROXYSMAL SUPRAVENTRICULAR TACHYCARDIA) (HCC): ICD-10-CM

## 2018-11-08 ENCOUNTER — OFFICE VISIT (OUTPATIENT)
Dept: PRIMARY CARE CLINIC | Age: 68
End: 2018-11-08

## 2018-11-08 VITALS
RESPIRATION RATE: 16 BRPM | OXYGEN SATURATION: 98 % | BODY MASS INDEX: 24.73 KG/M2 | TEMPERATURE: 98.4 F | WEIGHT: 134.4 LBS | HEART RATE: 80 BPM | HEIGHT: 62 IN | SYSTOLIC BLOOD PRESSURE: 117 MMHG | DIASTOLIC BLOOD PRESSURE: 77 MMHG

## 2018-11-08 DIAGNOSIS — Z23 ENCOUNTER FOR IMMUNIZATION: ICD-10-CM

## 2018-11-08 DIAGNOSIS — M85.89 OSTEOPENIA OF MULTIPLE SITES: ICD-10-CM

## 2018-11-08 DIAGNOSIS — Z71.89 ACP (ADVANCE CARE PLANNING): ICD-10-CM

## 2018-11-08 DIAGNOSIS — Z00.00 MEDICARE ANNUAL WELLNESS VISIT, SUBSEQUENT: Primary | ICD-10-CM

## 2018-11-09 NOTE — PROGRESS NOTES
LVEF WNL (59%), no RWMA. Prominent Chiari network noted in RA. LA size normal.    Wearing loop monitor prior to upcoming appointment. Follow up as scheduled, no changes to treatment plan in the interim.     Future Appointments  12/18/2018 2:40 PM    MD Loren Mcneil

## 2018-11-19 RX ORDER — ATORVASTATIN CALCIUM 10 MG/1
TABLET, FILM COATED ORAL
Qty: 90 TAB | Refills: 1 | Status: SHIPPED | OUTPATIENT
Start: 2018-11-19 | End: 2019-07-15 | Stop reason: SDUPTHER

## 2018-11-21 ENCOUNTER — TELEPHONE (OUTPATIENT)
Dept: PRIMARY CARE CLINIC | Age: 68
End: 2018-11-21

## 2018-11-21 DIAGNOSIS — Z72.820 SLEEP DEFICIENT: Primary | ICD-10-CM

## 2018-11-21 DIAGNOSIS — Z72.820 SLEEP DEFICIENT: ICD-10-CM

## 2018-11-21 RX ORDER — TRAZODONE HYDROCHLORIDE 150 MG/1
TABLET ORAL
Qty: 90 TAB | Refills: 0 | Status: SHIPPED | OUTPATIENT
Start: 2018-11-21 | End: 2019-04-19 | Stop reason: SDUPTHER

## 2018-11-21 RX ORDER — TRAZODONE HYDROCHLORIDE 150 MG/1
TABLET ORAL
Qty: 90 TAB | Refills: 0 | Status: SHIPPED | OUTPATIENT
Start: 2018-11-21 | End: 2018-11-21 | Stop reason: SDUPTHER

## 2018-11-21 NOTE — TELEPHONE ENCOUNTER
PT. Is still requesting trazodone 150 mg and is requesting it for a 90 days supply.     Last refill: 7/25/18    She states that if we have questions to please call her @ [phone]

## 2018-11-28 ENCOUNTER — DOCUMENTATION ONLY (OUTPATIENT)
Dept: CARDIOLOGY CLINIC | Age: 68
End: 2018-11-28

## 2018-12-05 ENCOUNTER — TELEPHONE (OUTPATIENT)
Dept: CARDIOLOGY CLINIC | Age: 68
End: 2018-12-05

## 2018-12-18 ENCOUNTER — OFFICE VISIT (OUTPATIENT)
Dept: CARDIOLOGY CLINIC | Age: 68
End: 2018-12-18

## 2018-12-18 VITALS
DIASTOLIC BLOOD PRESSURE: 70 MMHG | SYSTOLIC BLOOD PRESSURE: 120 MMHG | OXYGEN SATURATION: 96 % | BODY MASS INDEX: 25.8 KG/M2 | RESPIRATION RATE: 16 BRPM | WEIGHT: 140.2 LBS | HEART RATE: 68 BPM | HEIGHT: 62 IN

## 2018-12-18 DIAGNOSIS — I47.1 PSVT (PAROXYSMAL SUPRAVENTRICULAR TACHYCARDIA) (HCC): ICD-10-CM

## 2018-12-18 DIAGNOSIS — R00.2 INTERMITTENT PALPITATIONS: Primary | ICD-10-CM

## 2018-12-18 DIAGNOSIS — I49.3 PVC (PREMATURE VENTRICULAR CONTRACTION): ICD-10-CM

## 2018-12-18 DIAGNOSIS — I49.1 PAC (PREMATURE ATRIAL CONTRACTION): ICD-10-CM

## 2018-12-18 RX ORDER — DILTIAZEM HYDROCHLORIDE 120 MG/1
120 CAPSULE, COATED, EXTENDED RELEASE ORAL DAILY
Qty: 30 CAP | Refills: 6 | Status: SHIPPED | OUTPATIENT
Start: 2018-12-18 | End: 2019-01-02 | Stop reason: ALTCHOICE

## 2018-12-18 NOTE — PROGRESS NOTES
Cardiac Electrophysiology OFFICE Note     Subjective:      Emily Lane is a 79 y.o. patient who is seen for follow up of  rapid heart beats over the last several months. Palpitations typically occur upon awakening in the morning, but may occur under other conditions as well. Symptoms typically lasted 2-3 hours at a time. No known alleviating/exacerbating factors. She wore recent loop monitor, which was significant for sinus tachycardia both with & without LBBB. She states she noted symptoms while wearing monitor, but not as severe as previous. Loop in 10/2018 at [facility] significant for few PACs, 50 PVCs, & single 7 beat run PSVT. She denies chest pain, SOB, PND, orthopnea, lightheadedness, syncope, or edema. Previous:  Echo (11/06/2018): LVEF 59%, no RWMA, grade 1 diastolic dysfunction. Prominent Chiari network in RA. Trivial TR. Trivial TX. Palpitations x several years. Referred from Dr. Barb Bergeron office. 1700 PVCs on holter in 2009. Stress echo normal then. Patient Active Problem List   Diagnosis Code    Osteopenia M85.80    Arthritis M19.90    Hyperlipidemia E78.5    Mild depression ([facility] [address] F32.0     Current Outpatient Medications   Medication Sig Dispense Refill    dilTIAZem CD (CARDIZEM CD) 120 mg ER capsule Take 1 Cap by mouth daily. 30 Cap 6    traZODone (DESYREL) 150 mg tablet TAKE 1 TABLET NIGHTLY (NEED APPOINTMENT FOR ADDITIONAL REFILLS FOR CHRONIC MEDICAL CARE MANAGEMENT) 90 Tab 0    atorvastatin (LIPITOR) 10 mg tablet TAKE 1 TABLET DAILY 90 Tab 1    clotrimazole (LOTRIMIN) 1 % topical cream Apply  to affected area two (2) times a day. 15 g 0    desonide (DESOWEN) 0.05 % topical ointment Apply  to affected area two (2) times a day. 15 g 0    aspirin delayed-release 81 mg tablet Take 81 mg by mouth daily.  calcium 500 mg Tab Take 2 Tabs by mouth daily.  Cholecalciferol, Vitamin D3, (VITAMIN D) 1,000 unit Cap Take 1 Tab by mouth daily.       fish oil-dha-epa (FISH OIL) 1,200-144-216 mg Cap Take 2 Caps by mouth daily. No Known Allergies  Past Medical History:   Diagnosis Date    Arthritis     Hypercholesterolemia     Sun-damaged skin     Sunburn, blistering      Past Surgical History:   Procedure Laterality Date    ENDOSCOPY, COLON, DIAGNOSTIC  3/7/2012/    repeat in 5 years due family hx    HX GYN          HX ORTHOPAEDIC      rotator cuff tear     Family History   Problem Relation Age of Onset    Hypertension Mother     Stroke Mother     Cancer Father         leukemia     Social History     Tobacco Use    Smoking status: Never Smoker    Smokeless tobacco: Never Used   Substance Use Topics    Alcohol use: Yes     Comment: socially        Review of Systems:   Constitutional: Negative for fever, chills, weight loss, malaise/fatigue. HEENT: Negative for nosebleeds, vision changes. Respiratory: Negative for cough, hemoptysis  Cardiovascular: Negative for chest pain, + palpitations, no orthopnea, claudication, leg swelling, syncope, and PND. Gastrointestinal: Negative for nausea, vomiting, diarrhea, blood in stool and melena. Genitourinary: Negative for dysuria, and hematuria. Musculoskeletal: Negative for myalgias, arthralgia. Skin: Negative for rash. Heme: Does not bleed or bruise easily. Neurological: Negative for speech change and focal weakness     Objective:     Visit Vitals  /70 (BP 1 Location: Left arm, BP Patient Position: Sitting)   Pulse 68   Resp 16   Ht 5' 2\" (1.575 m)   Wt 140 lb 3.2 oz (63.6 kg)   SpO2 96%   BMI 25.64 kg/m²      Physical Exam:   Constitutional: well-developed and well-nourished. No respiratory distress. Head: Normocephalic and atraumatic. Eyes: Pupils are equal, round  ENT: hearing normal  Neck: supple. No JVD present. Cardiovascular: Normal rate, regular rhythm. Exam reveals no gallop and no friction rub. No murmur heard.   Pulmonary/Chest: Effort normal and breath sounds normal. No wheezes. Abdominal: Soft, no tenderness. Musculoskeletal: no edema. Neurological: alert,oriented. Skin: Skin is warm and dry  Psychiatric: normal mood and affect. Behavior is normal. Judgment and thought content normal.          Assessment/Plan:       ICD-10-CM ICD-9-CM    1. Intermittent palpitations R00.2 785.1    2. PSVT (paroxysmal supraventricular tachycardia) ([facility]) I47.1 427.0    3. PAC (premature atrial contraction) I49.1 427.61    4. PVC (premature ventricular contraction) I49.3 427.69      Ms. Giron has intermittent palpitations. She had recent echo with normal LVEF, most recent loop recorder showed ST with & without LBBB. Prior monitor earlier this year showed few PACs, occasional PVCs, & single 7 beat run PSVT. She said her daughter had inappropriate sinus tachycardia and is on beta-blocker. She would like something different. Discussed with patient, & she would like to trial medication for symptom control. Will start diltiazem  mg po daily. Follow up in 6 months. She will call sooner for intolerance to medication or worsening symptoms. Future Appointments   Date Time Provider Yoli Dozier   1/2/2019  9:30 AM SINDHU LAND 1 [facility] CORONEL SALVADOR   6/13/2019 11:40 AM Mabel Arzate  [address]         Thank you for involving me in this patient's care and please call with further concerns or questions. Richard Christie M.D.   Electrophysiology/Cardiology  [facility] [place]  [address], [address], Mehran Ortiz [address]  [address]  [phone]

## 2018-12-18 NOTE — PROGRESS NOTES
Cardiac Electrophysiology OFFICE Consultation Note     Subjective:      Ankit Styles is a 79 y.o. patient who is seen for follow up of  rapid heart beats over the last several months. Palpitations typically occur upon awakening in the morning, but may occur under other conditions as well. Symptoms typically lasted 2-3 hours at a time. No known alleviating/exacerbating factors. She wore recent loop monitor, which was significant for sinus tachycardia both with & without LBBB. She states she noted symptoms while wearing monitor, but not as severe as previous. Loop in 10/2018 at [facility] significant for few PACs, 50 PVCs, & single 7 beat run PSVT. She denies chest pain, SOB, PND, orthopnea, lightheadedness, syncope, or edema. Previous:  Echo (11/06/2018): LVEF 59%, no RWMA, grade 1 diastolic dysfunction. Prominent Chiari network in RA. Trivial TR. Trivial SD. Palpitations x several years. Referred from Dr. Mendoza Freeze office. 1700 PVCs on holter in 2009. Stress echo normal then. Patient Active Problem List   Diagnosis Code    Osteopenia M85.80    Arthritis M19.90    Hyperlipidemia E78.5    Mild depression ([facility] [address] F32.0     Current Outpatient Medications   Medication Sig Dispense Refill    traZODone (DESYREL) 150 mg tablet TAKE 1 TABLET NIGHTLY (NEED APPOINTMENT FOR ADDITIONAL REFILLS FOR CHRONIC MEDICAL CARE MANAGEMENT) 90 Tab 0    atorvastatin (LIPITOR) 10 mg tablet TAKE 1 TABLET DAILY 90 Tab 1    clotrimazole (LOTRIMIN) 1 % topical cream Apply  to affected area two (2) times a day. 15 g 0    desonide (DESOWEN) 0.05 % topical ointment Apply  to affected area two (2) times a day. 15 g 0    aspirin delayed-release 81 mg tablet Take 81 mg by mouth daily.  calcium 500 mg Tab Take 2 Tabs by mouth daily.  Cholecalciferol, Vitamin D3, (VITAMIN D) 1,000 unit Cap Take 1 Tab by mouth daily.       fish oil-dha-epa (FISH OIL) 1,200-144-216 mg Cap Take 2 Caps by mouth daily.      ALPRAZolam (XANAX) 0.5 mg tablet Take 1 Tab by mouth nightly as needed for Anxiety for up to 15 doses. Max Daily Amount: 0.5 mg. 15 Tab 0     No Known Allergies  Past Medical History:   Diagnosis Date    Arthritis     Hypercholesterolemia     Sun-damaged skin     Sunburn, blistering      Past Surgical History:   Procedure Laterality Date    ENDOSCOPY, COLON, DIAGNOSTIC  3/7/2012/    repeat in 5 years due family hx    HX GYN          HX ORTHOPAEDIC      rotator cuff tear     Family History   Problem Relation Age of Onset    Hypertension Mother     Stroke Mother     Cancer Father         leukemia     Social History     Tobacco Use    Smoking status: Never Smoker    Smokeless tobacco: Never Used   Substance Use Topics    Alcohol use: Yes     Comment: socially        Review of Systems:   Constitutional: Negative for fever, chills, weight loss, malaise/fatigue. HEENT: Negative for nosebleeds, vision changes. Respiratory: Negative for cough, hemoptysis  Cardiovascular: Negative for chest pain, + palpitations, no orthopnea, claudication, leg swelling, syncope, and PND. Gastrointestinal: Negative for nausea, vomiting, diarrhea, blood in stool and melena. Genitourinary: Negative for dysuria, and hematuria. Musculoskeletal: Negative for myalgias, arthralgia. Skin: Negative for rash. Heme: Does not bleed or bruise easily. Neurological: Negative for speech change and focal weakness     Objective:     Visit Vitals  /70 (BP 1 Location: Left arm, BP Patient Position: Sitting)   Pulse 68   Resp 16   Ht 5' 2\" (1.575 m)   Wt 140 lb 3.2 oz (63.6 kg)   SpO2 96%   BMI 25.64 kg/m²      Physical Exam:   Constitutional: well-developed and well-nourished. No respiratory distress. Head: Normocephalic and atraumatic. Eyes: Pupils are equal, round  ENT: hearing normal  Neck: supple. No JVD present. Cardiovascular: Normal rate, regular rhythm.  Exam reveals no gallop and no friction rub. No murmur heard. Pulmonary/Chest: Effort normal and breath sounds normal. No wheezes. Abdominal: Soft, no tenderness. Musculoskeletal: no edema. Neurological: alert,oriented. Skin: Skin is warm and dry  Psychiatric: normal mood and affect. Behavior is normal. Judgment and thought content normal.          Assessment/Plan:       ICD-10-CM ICD-9-CM    1. Intermittent palpitations R00.2 785.1    2. PSVT (paroxysmal supraventricular tachycardia) ([facility]) I47.1 427.0    3. PAC (premature atrial contraction) I49.1 427.61    4. PVC (premature ventricular contraction) I49.3 427.69      Ms. Giron has intermittent palpitations. She had recent echo with normal LVEF, most recent loop recorder showed ST with & without LBBB. Prior monitor earlier this year showed few PACs, occasional PVCs, & single 7 beat run PSVT. Discussed with patient, & she would like to trial medication for symptom control. Will start diltiazem  mg po daily. Follow up in 6 months. She will call sooner for intolerance to medication or worsening symptoms. Future Appointments   Date Time Provider Yoli Dozier   1/2/2019  9:30 AM SINDHU LAND 1 [facility] CORONEL [facility]   6/13/2019 11:40 AM Devonte Maurer  [address]         Thank you for involving me in this patient's care and please call with further concerns or questions. Marco Antonio Mcmullen M.D.   Electrophysiology/Cardiology  [facility] [place]  [address], Mehran Ortiz [address]  [address]  [phone]

## 2019-01-02 ENCOUNTER — HOSPITAL ENCOUNTER (OUTPATIENT)
Dept: BONE DENSITY | Age: 69
Discharge: HOME | End: 2019-01-02
Attending: INTERNAL MEDICINE
Payer: MEDICARE

## 2019-01-02 ENCOUNTER — TELEPHONE (OUTPATIENT)
Dept: CARDIOLOGY CLINIC | Age: 69
End: 2019-01-02

## 2019-01-02 DIAGNOSIS — M85.89 OSTEOPENIA OF MULTIPLE SITES: ICD-10-CM

## 2019-01-02 PROCEDURE — 77080 DXA BONE DENSITY AXIAL: CPT

## 2019-01-02 RX ORDER — METOPROLOL TARTRATE 25 MG/1
25 TABLET, FILM COATED ORAL 2 TIMES DAILY
Qty: 60 TAB | Refills: 5 | Status: SHIPPED | OUTPATIENT
Start: 2019-01-02 | End: 2019-05-23 | Stop reason: SDUPTHER

## 2019-01-02 NOTE — TELEPHONE ENCOUNTER
Regarding: FWWilliams After Visit Follow-Up Message. Contact: [phone]      ----- Message -----  From: Char Cox  Sent: 1/1/2019   3:05 PM  To: Stacy Mireles  Subject: Susie Ramos After Visit Follow-Up Message. ----- Message from [address] [place] [address] 95, [facility] sent at 1/1/2019  3:05 PM EST -----    I was experiencing increasing dizziness and light-headedness from the Diltiazem and was starting to feel uncomfortable driving. I stopped taking it as of 12/31 and feel more like my normal self today. Would it make sense to try a beta blocker instead?

## 2019-01-02 NOTE — TELEPHONE ENCOUNTER
Mychart message sent with NP recommendation. Awaiting patient confirmation of pharmacy before sending medication.

## 2019-01-02 NOTE — TELEPHONE ENCOUNTER
She's welcome to try a beta blocker. She can try metoprolol tartrate 25 mg po bid. If unable to tolerate that dose, she should call & we'll decrease it to 12.5 mg po bid. Advise patient to check BP if she experiences further dizziness/lightheadedness. Possible that diltiazem caused hypotension, thereby causing symptoms. She may have similar response to beta blocker.

## 2019-01-04 ENCOUNTER — TELEPHONE (OUTPATIENT)
Dept: PRIMARY CARE CLINIC | Age: 69
End: 2019-01-04

## 2019-01-04 NOTE — TELEPHONE ENCOUNTER
Pt called and stated she got her bone density results and they have gotten worse.  Stated she is taking vit d and calcium but would like to know how much of each she should be taking

## 2019-01-04 NOTE — PROGRESS NOTES
Ciera Cavazos , your bone density score has gotten worse. We need to discuss medication option on your next visit. Are you taking calcium & vitamin D?

## 2019-03-18 ENCOUNTER — TELEPHONE (OUTPATIENT)
Dept: CARDIOLOGY CLINIC | Age: 69
End: 2019-03-18

## 2019-04-19 DIAGNOSIS — Z72.820 SLEEP DEFICIENT: ICD-10-CM

## 2019-04-19 RX ORDER — TRAZODONE HYDROCHLORIDE 150 MG/1
TABLET ORAL
Qty: 90 TAB | Refills: 0 | Status: SHIPPED | OUTPATIENT
Start: 2019-04-19 | End: 2019-07-15 | Stop reason: SDUPTHER

## 2019-05-06 ENCOUNTER — OFFICE VISIT (OUTPATIENT)
Dept: DERMATOLOGY | Facility: AMBULATORY SURGERY CENTER | Age: 69
End: 2019-05-06

## 2019-05-06 ENCOUNTER — HOSPITAL ENCOUNTER (OUTPATIENT)
Dept: LAB | Age: 69
Discharge: HOME | End: 2019-05-06

## 2019-05-06 VITALS
OXYGEN SATURATION: 96 % | HEIGHT: 62 IN | SYSTOLIC BLOOD PRESSURE: 122 MMHG | BODY MASS INDEX: 24.84 KG/M2 | RESPIRATION RATE: 18 BRPM | HEART RATE: 63 BPM | DIASTOLIC BLOOD PRESSURE: 78 MMHG | TEMPERATURE: 98.9 F | WEIGHT: 135 LBS

## 2019-05-06 DIAGNOSIS — D48.5 NEOPLASM OF UNCERTAIN BEHAVIOR OF SKIN OF CHEEK: Primary | ICD-10-CM

## 2019-05-06 DIAGNOSIS — D23.9 DERMATOFIBROMA: ICD-10-CM

## 2019-05-06 DIAGNOSIS — L72.0 MILIAL CYST: ICD-10-CM

## 2019-05-06 PROCEDURE — 88341 IMHCHEM/IMCYTCHM EA ADD ANTB: CPT

## 2019-05-06 NOTE — PROGRESS NOTES
Written by Dot Phillip, as dictated by Janeth Teague, [address]. Name: Marylee Blush       Age: 81 y.o. Date: 5/6/2019    Chief Complaint:   Chief Complaint   Patient presents with    Skin Exam     bump on the right arm & spot on face       Subjective:    HPI:  Ms.. Marylee Blush is a 81 y.o. female who presents for the evaluation of a lesion on the right forearm. She states that the lesion appeared 1 year ago. The patient has not had prior treatment for this lesion. Associated symptoms include growing lesion. She states this has been present for 1 year and has recently become a little bigger but no other associated symptoms. Her daughter also notes a grey and dark lesion on the left cheek that was concerning. The patient believes this is a nevus with a hair.      ROS: Consitutional: Negative  Dermatological : negative    Social History     Socioeconomic History    Marital status:      Spouse name: Not on file    Number of children: Not on file    Years of education: Not on file    Highest education level: Not on file   Occupational History    Not on file   Social Needs    Financial resource strain: Not on file    Food insecurity:     Worry: Not on file     Inability: Not on file    Transportation needs:     Medical: Not on file     Non-medical: Not on file   Tobacco Use    Smoking status: Never Smoker    Smokeless tobacco: Never Used   Substance and Sexual Activity    Alcohol use: Yes     Comment: socially    Drug use: No    Sexual activity: Yes     Partners: Male   Lifestyle    Physical activity:     Days per week: Not on file     Minutes per session: Not on file    Stress: Not on file   Relationships    Social connections:     Talks on phone: Not on file     Gets together: Not on file     Attends Holiness service: Not on file     Active member of club or organization: Not on file     Attends meetings of clubs or organizations: Not on file     Relationship status: Not on file    Intimate partner violence:     Fear of current or ex partner: Not on file     Emotionally abused: Not on file     Physically abused: Not on file     Forced sexual activity: Not on file   Other Topics Concern    Not on file   Social History Narrative    Not on file       Family History   Problem Relation Age of Onset    Hypertension Mother     Stroke Mother     Cancer Father         leukemia       Past Medical History:   Diagnosis Date    Arthritis     Hypercholesterolemia     Sun-damaged skin     Sunburn, blistering        Past Surgical History:   Procedure Laterality Date    ENDOSCOPY, COLON, DIAGNOSTIC  3/7/2012/    repeat in 5 years due family hx    HX GYN          HX ORTHOPAEDIC      rotator cuff tear       Current Outpatient Medications   Medication Sig Dispense Refill    traZODone (DESYREL) 150 mg tablet TAKE 1 TABLET NIGHTLY (NEED APPOINTMENT FOR ADDITIONAL REFILLS FOR CHRONIC MEDICAL CARE MANAGEMENT) 90 Tab 0    metoprolol tartrate (LOPRESSOR) 25 mg tablet Take 1 Tab by mouth two (2) times a day. 60 Tab 5    atorvastatin (LIPITOR) 10 mg tablet TAKE 1 TABLET DAILY 90 Tab 1    aspirin delayed-release 81 mg tablet Take 81 mg by mouth daily.  calcium 500 mg Tab Take 2 Tabs by mouth daily.  Cholecalciferol, Vitamin D3, (VITAMIN D) 1,000 unit Cap Take 1 Tab by mouth daily.  fish oil-dha-epa (FISH OIL) 1,200-144-216 mg Cap Take 2 Caps by mouth daily.  clotrimazole (LOTRIMIN) 1 % topical cream Apply  to affected area two (2) times a day. 15 g 0    desonide (DESOWEN) 0.05 % topical ointment Apply  to affected area two (2) times a day.  15 g 0       No Known Allergies      Objective:    Visit Vitals  /78 (BP 1 Location: Right arm, BP Patient Position: Sitting)   Pulse 63   Temp 98.9 °F (37.2 °C) (Oral)   Resp 18   Ht 5' 2\" (1.575 m)   Wt 135 lb (61.2 kg)   SpO2 96%   BMI 24.69 kg/m²       Melia Diaz is a 81 y.o. female who appears well and in no distress. She is awake, alert, and oriented. There is no preauricular, submandibular, or cervical lymphadenopathy. A limited skin examination was completed including the right forearm and left cheek. There is a pink firm papule on the right forearm consistent with dermatofibroma. There is a 2 mm slightly greyish macule on the left cheek, concerning for atypical pigmented lesion vs milia. There are milia cysts on her face. Left cheek    Assessment/Plan:    1. Dermatofibroma, right forearm. The diagnosis was reviewed and the patient was reassured that no treatment is needed for these benign conditions at this time. The patient should follow up should the lesion change or become symptomatic. 2. Neoplasm of Uncertain Behavior, R/O atypical pigmented lesion vs milia cyst.  The differential diagnoses were discussed. A punch biopsy was advised to sample this lesion. The procedure was reviewed and verbal and written consent were obtained. The risks of pain, bleeding, infection, recurrence of the lesion, and scar were discussed. The patient is aware that this is a sample and is intended for diagnosis and not therapy of the skin lesion. I performed the procedure. The site was cleansed and anesthetized with 1% Lidocaine with Epinephrine 1:100,000. A punch biopsy was performed to sample the lesion. 6-0 fast gut suture was used for hemostasis. The wound was bandaged and care reviewed. The specimen was sent to pathology. I will contact the patient with the results and any further treatment that may be necessary. 3. Milia. The diagnosis was discussed. The patient was reassured that no treatment is necessary at this time. This plan was reviewed with the patient and patient agrees. All questions were answered. This scribe documentation was reviewed by me and accurately reflects the examination and decisions made by me.

## 2019-05-23 ENCOUNTER — OFFICE VISIT (OUTPATIENT)
Dept: PRIMARY CARE CLINIC | Age: 69
End: 2019-05-23

## 2019-05-23 ENCOUNTER — HOSPITAL ENCOUNTER (OUTPATIENT)
Dept: GENERAL RADIOLOGY | Age: 69
Discharge: HOME | End: 2019-05-23
Payer: MEDICARE

## 2019-05-23 VITALS
SYSTOLIC BLOOD PRESSURE: 115 MMHG | DIASTOLIC BLOOD PRESSURE: 70 MMHG | BODY MASS INDEX: 25.17 KG/M2 | HEIGHT: 62 IN | OXYGEN SATURATION: 97 % | TEMPERATURE: 98.6 F | WEIGHT: 136.8 LBS | RESPIRATION RATE: 16 BRPM | HEART RATE: 75 BPM

## 2019-05-23 DIAGNOSIS — F51.01 PRIMARY INSOMNIA: ICD-10-CM

## 2019-05-23 DIAGNOSIS — G89.29 CHRONIC RIGHT-SIDED LOW BACK PAIN WITHOUT SCIATICA: ICD-10-CM

## 2019-05-23 DIAGNOSIS — G89.29 CHRONIC RIGHT-SIDED LOW BACK PAIN WITHOUT SCIATICA: Primary | ICD-10-CM

## 2019-05-23 DIAGNOSIS — M54.50 CHRONIC RIGHT-SIDED LOW BACK PAIN WITHOUT SCIATICA: ICD-10-CM

## 2019-05-23 DIAGNOSIS — M81.0 AGE-RELATED OSTEOPOROSIS WITHOUT CURRENT PATHOLOGICAL FRACTURE: ICD-10-CM

## 2019-05-23 DIAGNOSIS — I47.1 PSVT (PAROXYSMAL SUPRAVENTRICULAR TACHYCARDIA) (HCC): ICD-10-CM

## 2019-05-23 DIAGNOSIS — M54.50 CHRONIC RIGHT-SIDED LOW BACK PAIN WITHOUT SCIATICA: Primary | ICD-10-CM

## 2019-05-23 PROCEDURE — 72100 X-RAY EXAM L-S SPINE 2/3 VWS: CPT

## 2019-05-23 RX ORDER — METOPROLOL TARTRATE 25 MG/1
12.5 TABLET, FILM COATED ORAL 2 TIMES DAILY
Qty: 90 TAB | Refills: 1 | Status: SHIPPED | OUTPATIENT
Start: 2019-05-23 | End: 2019-12-02 | Stop reason: SDUPTHER

## 2019-05-23 RX ORDER — ALENDRONATE SODIUM 70 MG/1
70 TABLET ORAL
Qty: 12 TAB | Refills: 1 | Status: SHIPPED | OUTPATIENT
Start: 2019-05-23 | End: 2019-10-17 | Stop reason: SDUPTHER

## 2019-05-23 NOTE — PROGRESS NOTES
Written by Ryan Olmos, as dictated by Dr. Anamaria Urban MD.    Obed Ibarra is a 81 y.o. female. HPI  The patient presents today c/o R hip pain, which she notes sometimes feels like R lower back pain. Her pain is intermittent and dull, but does not radiate down her leg. The pain has been present for several months to a year. Patient's pain is worse in the morning and varies in severity from day to day. She has found that activity does not worsen the pain and she has not been able to identify triggers. If she sits on a couch without much support she has pain, and has found that sitting on firmer surfaces does not trigger the pain. Patient believes her pain may be due to arthritis. She has tried OTC medication, but does not take it regularly so she is not sure if it provides relief. Dexa study performed on 01/02/2019 found: This patient is osteoporotic using the World Health Organization criteria As compared to the prior study, there has been a significant 4.7% decrease in the left total hip. The patient is now in the osteoporotic category because of inclusion of the forearm. Patient has been taking calcium and vitamin D. She admits that she has not been doing resistance exercises, but has recently been working on exercising. BP is good today at 115/70 and pulse is good at 75. Compliant on metoprolol 25 mg BID. She tried cutting back to 1/2 tab 25 mg BID as she was feeling lethargic, but her sxs returned. She is taking trazodone 150 mg for sleep, but notes that sometimes she only takes 100 mg.     Patient Active Problem List   Diagnosis Code    Arthritis M19.90    Hyperlipidemia E78.5    Mild depression ([facility] [address] F32.0        Current Outpatient Medications on File Prior to Visit   Medication Sig Dispense Refill    traZODone (DESYREL) 150 mg tablet TAKE 1 TABLET NIGHTLY (NEED APPOINTMENT FOR ADDITIONAL REFILLS FOR CHRONIC MEDICAL CARE MANAGEMENT) 90 Tab 0    metoprolol tartrate (LOPRESSOR) 25 mg tablet Take 1 Tab by mouth two (2) times a day. 60 Tab 5    atorvastatin (LIPITOR) 10 mg tablet TAKE 1 TABLET DAILY 90 Tab 1    clotrimazole (LOTRIMIN) 1 % topical cream Apply  to affected area two (2) times a day. 15 g 0    desonide (DESOWEN) 0.05 % topical ointment Apply  to affected area two (2) times a day. 15 g 0    aspirin delayed-release 81 mg tablet Take 81 mg by mouth daily.  calcium 500 mg Tab Take 2 Tabs by mouth daily.  Cholecalciferol, Vitamin D3, (VITAMIN D) 1,000 unit Cap Take 1 Tab by mouth daily.  fish oil-dha-epa (FISH OIL) 1,200-144-216 mg Cap Take 2 Caps by mouth daily. No current facility-administered medications on file prior to visit.         Past Medical History:   Diagnosis Date    Arthritis     Hypercholesterolemia     Sun-damaged skin     Sunburn, blistering        Past Surgical History:   Procedure Laterality Date    ENDOSCOPY, COLON, DIAGNOSTIC  3/7/2012/    repeat in 5 years due family hx    HX GYN          HX ORTHOPAEDIC      rotator cuff tear       Family History   Problem Relation Age of Onset    Hypertension Mother     Stroke Mother     Cancer Father         leukemia       Social History     Socioeconomic History    Marital status:      Spouse name: Not on file    Number of children: Not on file    Years of education: Not on file    Highest education level: Not on file   Occupational History    Not on file   Social Needs    Financial resource strain: Not on file    Food insecurity:     Worry: Not on file     Inability: Not on file    Transportation needs:     Medical: Not on file     Non-medical: Not on file   Tobacco Use    Smoking status: Never Smoker    Smokeless tobacco: Never Used   Substance and Sexual Activity    Alcohol use: Yes     Comment: socially    Drug use: No    Sexual activity: Yes     Partners: Male   Lifestyle    Physical activity:     Days per week: Not on file     Minutes per session: Not on file    Stress: Not on file   Relationships    Social connections:     Talks on phone: Not on file     Gets together: Not on file     Attends Catholic service: Not on file     Active member of club or organization: Not on file     Attends meetings of clubs or organizations: Not on file     Relationship status: Not on file    Intimate partner violence:     Fear of current or ex partner: Not on file     Emotionally abused: Not on file     Physically abused: Not on file     Forced sexual activity: Not on file   Other Topics Concern    Not on file   Social History Narrative    Not on file       Review of Systems   Respiratory: Negative for cough and shortness of breath. Cardiovascular: Negative for chest pain and palpitations. Musculoskeletal: Positive for back pain (R lower back) and joint pain (R hip). Negative for myalgias. Neurological: Negative for dizziness, tingling, sensory change, weakness and headaches. Psychiatric/Behavioral: Negative for depression, memory loss and substance abuse. The patient has insomnia (improved on trazodone). Visit Vitals  /70 (BP 1 Location: Left arm, BP Patient Position: Sitting)   Pulse 75   Temp 98.6 °F (37 °C) (Oral)   Resp 16   Ht 5' 2\" (1.575 m)   Wt 136 lb 12.8 oz (62.1 kg)   SpO2 97%   BMI 25.02 kg/m²       Physical Exam   Constitutional: She is oriented to person, place, and time. She appears well-developed and well-nourished. No distress. HENT:   Right Ear: External ear normal.   Left Ear: External ear normal.   Eyes: Conjunctivae and EOM are normal. Right eye exhibits no discharge. Left eye exhibits no discharge. Neck: Normal range of motion. Neck supple. Cardiovascular: Normal rate and regular rhythm. Pulmonary/Chest: Effort normal and breath sounds normal. She has no wheezes. Abdominal: Soft. Bowel sounds are normal. There is no tenderness.    Musculoskeletal:   R hip ROM normal, nontender  R lower  on exam   Lymphadenopathy:     She has no cervical adenopathy. Neurological: She is alert and oriented to person, place, and time. Skin: She is not diaphoretic. Psychiatric: She has a normal mood and affect. Her behavior is normal.   Nursing note and vitals reviewed. ASSESSMENT and PLAN    ICD-10-CM ICD-9-CM    1. Chronic right-sided low back pain without sciatica M54.5 724.2 XR SPINE LUMB 2 OR 3 V    G89.29 338.29 REFERRAL TO PHYSICAL THERAPY    XR spine lumbar ordered. Referred to PT. 2. Primary insomnia F51.01 307.42 Doing well on trazodone. 3. Age-related osteoporosis without current pathological fracture M81.0 733.01 alendronate (FOSAMAX) 70 mg tablet sent to pharmacy. Fosamax 70 mg prescribed. Potential side effects were discussed. She should take 1 tab PO once per week. Instructed patient to take with a large glass of water and remain upright for at least 30-45 minutes after taking Fosamax. Patient should let me know if she experiences side effects and I will prescribe another medication. Will repeat Dexa in 2 years. 4. PSVT (paroxysmal supraventricular tachycardia) ([facility]) I47.1 427.0 Heart rate is well controlled on metoprolol 25 mg BID. This plan was reviewed with the patient and patient agrees. All questions were answered. This scribe documentation was reviewed by me and accurately reflects the examination and decisions made by me. This note will not be viewable in [address]

## 2019-05-23 NOTE — PROGRESS NOTES
Kelly Oliveira is a 81 y.o. female    Chief Complaint   Patient presents with    Hip Pain     dull pain in right hip x several months to a year. Mostly in the mornings. 1. Have you been to the ER, urgent care clinic since your last visit? Hospitalized since your last visit? No    2. Have you seen or consulted any other health care providers outside of the [address] [place] [address] since your last visit? Include any pap smears or colon screening.  No     Visit Vitals  /70 (BP 1 Location: Left arm, BP Patient Position: Sitting)   Pulse 75   Temp 98.6 °F (37 °C) (Oral)   Resp 16   Ht 5' 2\" (1.575 m)   Wt 136 lb 12.8 oz (62.1 kg)   SpO2 97%   BMI 25.02 kg/m²     Health Maintenance Due   Topic Date Due    Shingrix Vaccine Age 49> (1 of 2) 01/04/2002     Tanvir Velarde LPN

## 2019-05-23 NOTE — TELEPHONE ENCOUNTER
Request for Metoprolol Tartrate 12.5 mg BID. Last office visit 12/18/18, next office visit 6/13/19. Refills per verbal order from Dr. Vivian Iglesias.

## 2019-05-23 NOTE — PROGRESS NOTES
Yesenia Daley , your back X-ray showed extensive arthritis. Let see if physical therapy helps. If pain gets worse then we have to do MRI.

## 2019-06-03 ENCOUNTER — HOSPITAL ENCOUNTER (OUTPATIENT)
Dept: PHYSICAL THERAPY | Age: 69
Discharge: HOME | End: 2019-06-03
Payer: MEDICARE

## 2019-06-03 PROCEDURE — 97014 ELECTRIC STIMULATION THERAPY: CPT | Performed by: PHYSICAL THERAPY ASSISTANT

## 2019-06-03 PROCEDURE — 97110 THERAPEUTIC EXERCISES: CPT | Performed by: PHYSICAL THERAPY ASSISTANT

## 2019-06-03 PROCEDURE — 97161 PT EVAL LOW COMPLEX 20 MIN: CPT | Performed by: PHYSICAL THERAPY ASSISTANT

## 2019-06-03 NOTE — PROGRESS NOTES
[facility]  [address]  Phone: [phone]  Fax: [phone]    Plan of Care/Statement of Necessity for Physical Therapy Services  2-15    Patient name: Nano You  : 1950  Provider#: [id number]  Referral source: Chandana Benavides MD      Medical/Treatment Diagnosis: Low back pain [M54.5]     Prior Hospitalization: see medical history     Comorbidities: see chart  Prior Level of Function: see chart  Medications: Verified on Patient Summary List  Start of Care: 6/3/19      Onset Date: few months ago   The Plan of Care and following information is based on the information from the initial evaluation. Assessment/ key information: Pt has signs and symptoms of low back arthritis and R anterior innominate rotation that affects her ability to ambulate, transfer, workout, sleep, and sit. Pt is a good candidate for therapy.     Evaluation Complexity History LOW Complexity : Zero comorbidities / personal factors that will impact the outcome / POC; Examination LOW Complexity : 1-2 Standardized tests and measures addressing body structure, function, activity limitation and / or participation in recreation  ;Presentation LOW Complexity : Stable, uncomplicated  ;Clinical Decision Making MEDIUM Complexity : FOTO score of 26-74  Overall Complexity Rating: LOW     Problem List: pain affecting function, decrease ROM, decrease strength, impaired gait/ balance, decrease ADL/ functional abilitiies, decrease activity tolerance, decrease flexibility/ joint mobility and decrease transfer abilities   Treatment Plan may include any combination of the following: Therapeutic exercise, Therapeutic activities, Neuromuscular re-education, Physical agent/modality, Manual therapy and Patient education  Patient / Family readiness to learn indicated by: asking questions  Persons(s) to be included in education: patient (P)  Barriers to Learning/Limitations: None  Patient Goal (s): no pain when working out  Patient Self Reported Health Status: good  Rehabilitation Potential: good    Short Term Goals: To be accomplished in 2 weeks:  Pt will be independent w/ HEP in order to take active role in therapy  Pt will report 0/10 pain at rest in order to improve quality of life  Pt will be able to sit for 30 minutes w/o increase in pain in order to chelsie  Long Term Goals: To be accomplished in 6 weeks:  Pt will improve FOTO score by at least 10 points in order to improve functional mobility  Pt will demonstrate 5/5 R LE strength in order to return to gym exercises  Pt will be able to workout at gym w/o increase in pain  Frequency / Duration: Patient to be seen 2 times per week for 6 weeks. Patient/ Caregiver education and instruction: self care    [x]  Plan of care has been reviewed with PTA        Certification Period: 6/3/19-9/3/19  Son Roy, PT, DPT 6/3/2019    ________________________________________________________________________    I certify that the above Therapy Services are being furnished while the patient is under my care. I agree with the treatment plan and certify that this therapy is necessary.     [de-identified] Signature:____________________  Date:____________Time: _________

## 2019-06-03 NOTE — PROGRESS NOTES
PT INITIAL EVALUATION NOTE - [facility] 2-15    Patient Name: Mitchell Guevara  Date:6/3/2019  : 1950  [x]  Patient  Verified  [place]or: [place] MED[place]ARE / Plan: [place] MEDICARE PART A & B / Product Type: Medicare /    In time:11:00 am  Out time:12:00 pm  Total Treatment Time (min): 60  Total Timed Codes (min): 15  1:1 Treatment Time ( only): 15   Visit #: 1     Treatment Area: Low back pain [M54.5]    SUBJECTIVE  Pain Level (0-10 scale): 1  Any medication changes, allergies to medications, adverse drug reactions, diagnosis change, or new procedure performed?: [] No    [x] Yes (see summary sheet for update)  Subjective:    Pt complains of R sided low back pain that started a few months ago w/ insidious onset. Pt reports she started having R hip pain and went to MD but x-ray revealed arthritis. Pt has pain when twisting and arching her back. Pt has no pain when sleeping on back at night. Sometimes her R leg will feel heavy when walking. Pt reports pain does not extend down her leg heat helps decrease her symptoms. PLOF: working out at [employer] of Injury: insidious  Previous Treatment/Compliance: good  PMHx/Surgical Hx: see chart  Work Hx: retired  Living Situation: w/   Pt Goals: \"no pain when working out\"  Barriers: none  Motivation: good  Substance use: none  FABQ Score: see FOTO  Cognition: A & O x 4        OBJECTIVE/EXAMINATION  Posture: Forward head and rounded shoulders  Gait and Functional Mobility:   Forward trunk lean  Palpation: TTP over R lumbar paraspinals, multifidus and R QL  Pain w/ P/A mobs to lumbar spine    Lumbar ROM: full flexion, extension limited by 50% w/ pain, R rot limited by 25 % w/ pain    LOWER QUARTER   MUSCLE STRENGTH  KEY       R  L  0 - No Contraction  L1, L2 Psoas  4-  5  1 - Trace   L3 Quads  4  5  2 - Poor   L4 Tib Ant  5  5  3 - Fair    L5 EHL  5  5  4 - Good   S1 FHL  5  5  5 - Normal   S2 Hams  4-  5        MMT: pain w/ hip flexion and hamstrings curl  Neurological: Reflexes / Sensations: normal  Special Tests:    Trendelenberg: neg    Stork: + on R   Forward Bend: neg    Slump: neg   Maximiliano: neg     Andrew: neg   H.S. SLR: neg     Piriformis Ext: neg   Long Sit: + on R       Compression/Distraction: neg        Modality rationale: decrease pain, increase tissue extensibility and increase muscle contraction/control to improve the patients ability to ambulate and transfer   Min Type Additional Details   15 [x] Estim: []Att   [x]Unatt        []TENS instruct                  [x]IFC  []Premod   []NMES                     []Other:  []w/US   []w/ice   [x]w/heat  Position: supine  Location: lumbar paraspinals    []  Traction: [] Cervical       []Lumbar                       [] Prone          []Supine                       []Intermittent   []Continuous Lbs:  [] before manual  [] after manual  []w/heat    []  Ultrasound: []Continuous   [] Pulsed at:                           []1MHz   []3MHz Location:  W/cm2:    [] Paraffin         Location:   []w/heat    []  Ice     []  Heat  []  Ice massage Position:  Location:    []  Laser  []  Other: Position:  Location:      []  Vasopneumatic Device Pressure:       [] lo [] med [] hi   Temperature:      [x] Skin assessment post-treatment:  [x]intact []redness- no adverse reaction    []redness  adverse reaction:     15 min Therapeutic Exercise:  [x] See flow sheet : MET for R ant innominate rotation   Rationale: increase ROM, increase strength and improve coordination to improve the patients ability to ambulate and transfer            With   [] TE   [] TA   [] neuro   [] other: Patient Education: [x] Review HEP    [] Progressed/Changed HEP based on:   [] positioning   [] body mechanics   [] transfers   [] heat/ice application    [] other:      Other Objective/Functional Measures: NT    Pain Level (0-10 scale) post treatment: 0    ASSESSMENT/Changes in Function:     [x]  See Plan of Care      Ismael Roy, PT, DPT 6/3/2019

## 2019-06-12 ENCOUNTER — HOSPITAL ENCOUNTER (OUTPATIENT)
Dept: PHYSICAL THERAPY | Age: 69
Discharge: HOME | End: 2019-06-12
Payer: MEDICARE

## 2019-06-12 PROCEDURE — 97110 THERAPEUTIC EXERCISES: CPT | Performed by: PHYSICAL THERAPY ASSISTANT

## 2019-06-12 PROCEDURE — 97014 ELECTRIC STIMULATION THERAPY: CPT | Performed by: PHYSICAL THERAPY ASSISTANT

## 2019-06-12 PROCEDURE — 97140 MANUAL THERAPY 1/> REGIONS: CPT | Performed by: PHYSICAL THERAPY ASSISTANT

## 2019-06-13 ENCOUNTER — OFFICE VISIT (OUTPATIENT)
Dept: CARDIOLOGY CLINIC | Age: 69
End: 2019-06-13

## 2019-06-13 VITALS
WEIGHT: 137.4 LBS | DIASTOLIC BLOOD PRESSURE: 70 MMHG | OXYGEN SATURATION: 96 % | HEIGHT: 62 IN | BODY MASS INDEX: 25.28 KG/M2 | SYSTOLIC BLOOD PRESSURE: 110 MMHG | HEART RATE: 55 BPM

## 2019-06-13 DIAGNOSIS — I47.1 PSVT (PAROXYSMAL SUPRAVENTRICULAR TACHYCARDIA) (HCC): ICD-10-CM

## 2019-06-13 DIAGNOSIS — I49.1 PAC (PREMATURE ATRIAL CONTRACTION): ICD-10-CM

## 2019-06-13 DIAGNOSIS — I49.3 PVC (PREMATURE VENTRICULAR CONTRACTION): ICD-10-CM

## 2019-06-13 DIAGNOSIS — R00.2 INTERMITTENT PALPITATIONS: Primary | ICD-10-CM

## 2019-06-13 NOTE — PROGRESS NOTES
Cardiac Electrophysiology OFFICE Note Subjective:  
  
Brielle Nichols is a 81 y.o. patient who is seen for follow up of palpitations, PSVT, PACs, PVCs. Intolerant of diltiazem, switched to metoprolol. Noted dizziness & fatigue with 25 mg po bid dosing, decreased to 12.5 mg po bid & feels she's doing well on this dose. Occasional breakthrough palpitations, typically at night or with anxiety, but these only last a few minutes at a time, spontaneously resolve. She denies chest pain, SOB, PND, orthopnea, lightheadedness, syncope, or edema. Previous: 
Echo (11/06/2018): LVEF 59%, no RWMA, grade 1 diastolic dysfunction. Prominent Chiari network in RA. Trivial TR. Trivial [place]. Loop monitor (10/2018): Few PACs, 50 PVCs, & single 7 beat run PSVT. Palpitations x several years. Referred from Dr. Jay Faria office. Previous loop monitor significant for sinus tachycardia both with & without LBBB. 1700 PVCs on holter in 2009. Stress echo normal then. Patient Active Problem List  
Diagnosis Code  Arthritis M19.90  
 Hyperlipidemia E78.5  Mild depression (HCC) F32.0 Current Outpatient Medications Medication Sig Dispense Refill  alendronate (FOSAMAX) 70 mg tablet Take 1 Tab by mouth every seven (7) days for 90 days. 12 Tab 1  
 metoprolol tartrate (LOPRESSOR) 25 mg tablet Take 0.5 Tabs by mouth two (2) times a day. 90 Tab 1  
 traZODone (DESYREL) 150 mg tablet TAKE 1 TABLET NIGHTLY (NEED APPOINTMENT FOR ADDITIONAL REFILLS FOR CHRONIC MEDICAL CARE MANAGEMENT) 90 Tab 0  
 atorvastatin (LIPITOR) 10 mg tablet TAKE 1 TABLET DAILY 90 Tab 1  clotrimazole (LOTRIMIN) 1 % topical cream Apply  to affected area two (2) times a day. 15 g 0  
 desonide (DESOWEN) 0.05 % topical ointment Apply  to affected area two (2) times a day. 15 g 0  
 aspirin delayed-release 81 mg tablet Take 81 mg by mouth daily.  calcium 500 mg Tab Take 2 Tabs by mouth daily.  Cholecalciferol, Vitamin D3, (VITAMIN D) 1,000 unit Cap Take 1 Tab by mouth daily.  fish oil-dha-epa (FISH OIL) 1,200-144-216 mg Cap Take 2 Caps by mouth daily. No Known Allergies Past Medical History:  
Diagnosis Date  Arthritis  Hypercholesterolemia  Sun-damaged skin  Sunburn, blistering Past Surgical History:  
Procedure Laterality Date  ENDOSCOPY, COLON, DIAGNOSTIC  3/7/2012/  
 repeat in 5 years due family hx  HX GYN    
   HX ORTHOPAEDIC    
 rotator cuff tear Family History Problem Relation Age of Onset  Hypertension Mother  Stroke Mother  Cancer Father   
     leukemia Social History Tobacco Use  Smoking status: Never Smoker  Smokeless tobacco: Never Used Substance Use Topics  Alcohol use: Yes Comment: socially Review of Systems:  
Constitutional: Negative for fever, chills, weight loss, malaise/fatigue. HEENT: Negative for nosebleeds, vision changes. Respiratory: Negative for cough, hemoptysis Cardiovascular: Negative for chest pain, + occasional brief palpitations, no orthopnea, claudication, leg swelling, syncope, and PND. Gastrointestinal: Negative for nausea, vomiting, diarrhea, blood in stool and melena. Genitourinary: Negative for dysuria, and hematuria. Musculoskeletal: Negative for myalgias, arthralgia. Skin: Negative for rash. Heme: Does not bleed or bruise easily. Neurological: Negative for speech change and focal weakness Objective:  
 
Visit Vitals /70 (BP 1 Location: Left arm, BP Patient Position: Sitting) Pulse (!) 55 Ht 5' 2\" (1.575 m) Wt 137 lb 6.4 oz (62.3 kg) SpO2 96% BMI 25.13 kg/m² Physical Exam:  
Constitutional: Well-developed and well-nourished. No respiratory distress. Head: Normocephalic and atraumatic. Eyes: Pupils are equal, round ENT: Hearing grossly normal 
Neck: Supple. No JVD present. Cardiovascular: Normal rate, regular rhythm. Exam reveals no gallop and no friction rub. No murmur heard. Pulmonary/Chest: Effort normal and breath sounds normal. No wheezes. Abdominal: Soft, no tenderness. Musculoskeletal: No edema. Neurological: alert,oriented. Skin: Skin is warm and dry Psychiatric: Normal mood and affect. Behavior is normal. Judgment and thought content normal.   
 
 
Assessment/Plan: ICD-10-CM ICD-9-CM 1. Intermittent palpitations R00.2 785.1 2. PSVT (paroxysmal supraventricular tachycardia) (HCC) I47.1 427.0 3. PAC (premature atrial contraction) I49.1 427.61 4. PVC (premature ventricular contraction) I49.3 427.69   
 
Ms. Giron states palpitations are overall well controlled, feels that metoprolol 12.5 mg po bid isn't causing undesirable side effects. BP well controlled. Denies orthostatic hypotension. If she notes a day where palpitations are more prominent, she may take an extra 12.5 mg of metoprolol prn. Otherwise, continue 12.5 mg po bid. Follow up in 1 year. She will call sooner for intolerance to medication or worsening symptoms. Future Appointments Date Time Provider Yoli Dozier 6/19/2019 11:00 [facility]  
6/26/2019 11:00 AM [facility]  
6/16/2020 10:00 AM Sudeep Farooq  [address] Thank you for involving me in this patient's care and please call with further concerns or questions. Maryjo Street M.D. Electrophysiology/Cardiology [facility] [address] [place] [address], [address] [place] [address] 
[phone]

## 2019-06-13 NOTE — PROGRESS NOTES
PT DAILY TREATMENT NOTE - [facility] 2-15    Patient Name: William Knox  Date:2019  : 1950  [x]  Patient  Verified  Payor: Tamar Crane / Plan: [place] & [place] / Product Type: Medicare /    In time:4:20 pm  Out time:5:20 pm  Total Treatment Time (min): 60   Total Timed Codes (min): 50  1:1 Treatment Time ( [address] only): 35   Visit #:  2    Treatment Area: Low back pain [M54.5]    SUBJECTIVE  Pain Level (0-10 scale): 2  Any medication changes, allergies to medications, adverse drug reactions, diagnosis change, or new procedure performed?: [x] No    [] Yes (see summary sheet for update)  Subjective functional status/changes:   [] No changes reported  Pt states she is doing well overall but has a question about her HEP.     OBJECTIVE    Modality rationale: decrease edema, decrease inflammation and decrease pain to improve the patients ability to ambulate   Min Type Additional Details      10 [x] Estim: []Att   [x]Unatt    []TENS instruct                  []IFC  []Premod   []NMES                     []Other:  []w/US   [x]w/ice   []w/heat  Position: prone  Location: R QL       []  Traction: [] Cervical       []Lumbar                       [] Prone          []Supine                       []Intermittent   []Continuous Lbs:  [] before manual  [] after manual  []w/heat    []  Ultrasound: []Continuous   [] Pulsed                       at: []1MHz   []3MHz Location:  W/cm2:    [] Paraffin         Location:   []w/heat    []  Ice     []  Heat  []  Ice massage Position:  Location:    []  Laser  []  Other: Position:  Location:      []  Vasopneumatic Device Pressure:       [] lo [] med [] hi   Temperature:      [x] Skin assessment post-treatment:  [x]intact []redness- no adverse reaction    []redness  adverse reaction:     35 min Therapeutic Exercise:  [x] See flow sheet :   Rationale: increase ROM, increase strength and improve coordination to improve the patients ability to ambulate and transfer      15 min Manual Therapy: passive hip distraction, inferior mobs on femur grade 3, s/l R QL stretch, STM to R QL    Rationale: decrease pain, increase ROM, increase tissue extensibility and decrease trigger points to improve the patients ability to ambulate          With   [] TE   [] TA   [] neuro   [] other: Patient Education: [x] Review HEP    [] Progressed/Changed HEP based on:   [] positioning   [] body mechanics   [] transfers   [] heat/ice application    [] other:      Other Objective/Functional Measures: NT     Pain Level (0-10 scale) post treatment: 2    ASSESSMENT/Changes in Function:   Pt tolerated there-ex well and was educated on proper form for HEP. Pt feels relief w/ inferior hip mobs and s/l QL stretch. Patient will continue to benefit from skilled PT services to modify and progress therapeutic interventions, address functional mobility deficits, address ROM deficits, address strength deficits, analyze and address soft tissue restrictions, analyze and cue movement patterns and analyze and modify body mechanics/ergonomics to attain remaining goals.      []  See Plan of Care  []  See progress note/recertification  []  See Discharge Summary         Progress towards goals / Updated goals:  NT    PLAN  [x]  Upgrade activities as tolerated     [x]  Continue plan of care  [x]  Update interventions per flow sheet       []  Discharge due to:_  []  Other:_      Usha Paz PT, DPT 6/13/2019

## 2019-06-13 NOTE — PROGRESS NOTES
Cardiac Electrophysiology OFFICE Note     Subjective:      Salinas Langley is a 81 y.o. patient who is seen for follow up of palpitations, PSVT, PACs, PVCs. Intolerant of diltiazem, switched to metoprolol. Noted dizziness & fatigue with 25 mg po bid dosing, decreased to 12.5 mg po bid & feels she's doing well on this dose. Occasional breakthrough palpitations, typically at night or with anxiety, but these only last a few minutes at a time, spontaneously resolve. She denies chest pain, SOB, PND, orthopnea, lightheadedness, syncope, or edema. Previous:  Echo (11/06/2018): LVEF 59%, no RWMA, grade 1 diastolic dysfunction. Prominent Chiari network in RA. Trivial TR. Trivial [place]. Loop monitor (10/2018): Few PACs, 50 PVCs, & single 7 beat run PSVT. Palpitations x several years. Referred from Dr. Mena Nava office. Previous loop monitor significant for sinus tachycardia both with & without LBBB. 1700 PVCs on holter in 2009. Stress echo normal then. Patient Active Problem List   Diagnosis Code    Arthritis M19.90    Hyperlipidemia E78.5    Mild depression ([facility] [address] F32.0     Current Outpatient Medications   Medication Sig Dispense Refill    alendronate (FOSAMAX) 70 mg tablet Take 1 Tab by mouth every seven (7) days for 90 days. 12 Tab 1    metoprolol tartrate (LOPRESSOR) 25 mg tablet Take 0.5 Tabs by mouth two (2) times a day. 90 Tab 1    traZODone (DESYREL) 150 mg tablet TAKE 1 TABLET NIGHTLY (NEED APPOINTMENT FOR ADDITIONAL REFILLS FOR CHRONIC MEDICAL CARE MANAGEMENT) 90 Tab 0    atorvastatin (LIPITOR) 10 mg tablet TAKE 1 TABLET DAILY 90 Tab 1    clotrimazole (LOTRIMIN) 1 % topical cream Apply  to affected area two (2) times a day. 15 g 0    desonide (DESOWEN) 0.05 % topical ointment Apply  to affected area two (2) times a day. 15 g 0    aspirin delayed-release 81 mg tablet Take 81 mg by mouth daily.  calcium 500 mg Tab Take 2 Tabs by mouth daily.       Cholecalciferol, Vitamin D3, (VITAMIN D) 1,000 unit Cap Take 1 Tab by mouth daily.  fish oil-dha-epa (FISH OIL) 1,200-144-216 mg Cap Take 2 Caps by mouth daily. No Known Allergies  Past Medical History:   Diagnosis Date    Arthritis     Hypercholesterolemia     Sun-damaged skin     Sunburn, blistering      Past Surgical History:   Procedure Laterality Date    ENDOSCOPY, COLON, DIAGNOSTIC  3/7/2012/    repeat in 5 years due family hx    HX GYN          HX ORTHOPAEDIC      rotator cuff tear     Family History   Problem Relation Age of Onset    Hypertension Mother     Stroke Mother     Cancer Father         leukemia     Social History     Tobacco Use    Smoking status: Never Smoker    Smokeless tobacco: Never Used   Substance Use Topics    Alcohol use: Yes     Comment: socially        Review of Systems:   Constitutional: Negative for fever, chills, weight loss, malaise/fatigue. HEENT: Negative for nosebleeds, vision changes. Respiratory: Negative for cough, hemoptysis  Cardiovascular: Negative for chest pain, + occasional brief palpitations, no orthopnea, claudication, leg swelling, syncope, and PND. Gastrointestinal: Negative for nausea, vomiting, diarrhea, blood in stool and melena. Genitourinary: Negative for dysuria, and hematuria. Musculoskeletal: Negative for myalgias, arthralgia. Skin: Negative for rash. Heme: Does not bleed or bruise easily. Neurological: Negative for speech change and focal weakness     Objective:     Visit Vitals  /70 (BP 1 Location: Left arm, BP Patient Position: Sitting)   Pulse (!) 55   Ht 5' 2\" (1.575 m)   Wt 137 lb 6.4 oz (62.3 kg)   SpO2 96%   BMI 25.13 kg/m²      Physical Exam:   Constitutional: Well-developed and well-nourished. No respiratory distress. Head: Normocephalic and atraumatic. Eyes: Pupils are equal, round  ENT: Hearing grossly normal  Neck: Supple. No JVD present. Cardiovascular: Normal rate, regular rhythm.  Exam reveals no gallop and no friction rub. No murmur heard. Pulmonary/Chest: Effort normal and breath sounds normal. No wheezes. Abdominal: Soft, no tenderness. Musculoskeletal: No edema. Neurological: alert,oriented. Skin: Skin is warm and dry  Psychiatric: Normal mood and affect. Behavior is normal. Judgment and thought content normal.        Assessment/Plan:       ICD-10-CM ICD-9-CM    1. Intermittent palpitations R00.2 785.1    2. PSVT (paroxysmal supraventricular tachycardia) (HCC) I47.1 427.0    3. PAC (premature atrial contraction) I49.1 427.61    4. PVC (premature ventricular contraction) I49.3 427.69      Ms. Giron states palpitations are overall well controlled, feels that metoprolol 12.5 mg po bid isn't causing undesirable side effects at this dose except for occasional dizziness. BP well controlled. If she notes a day where palpitations are more prominent, she may take an extra 12.5 mg of metoprolol prn. Otherwise, continue 12.5 mg po bid. She agrees side effects of drug are not concerning and sx of palpitation not severe enough to warrant ablation    Follow up in 1 year. She will call sooner for intolerance to medication or worsening symptoms. Future Appointments   Date Time Provider Yoli Dozier   6/19/2019 11:00 AM Frank Fletcher [facility]   6/26/2019 11:00 AM Robby Roy [facility]   6/16/2020 10:00 AM Nina Goldsmith  [address]       Thank you for involving me in this patient's care and please call with further concerns or questions. Terry Varghese M.D.   Electrophysiology/Cardiology  [address] [facility] [place]  [address], [address], [address]  [address]  [phone]

## 2019-06-19 ENCOUNTER — HOSPITAL ENCOUNTER (OUTPATIENT)
Dept: PHYSICAL THERAPY | Age: 69
Discharge: HOME | End: 2019-06-19
Payer: MEDICARE

## 2019-06-19 PROCEDURE — 97110 THERAPEUTIC EXERCISES: CPT | Performed by: PHYSICAL THERAPY ASSISTANT

## 2019-06-19 PROCEDURE — 97014 ELECTRIC STIMULATION THERAPY: CPT | Performed by: PHYSICAL THERAPY ASSISTANT

## 2019-06-19 PROCEDURE — 97140 MANUAL THERAPY 1/> REGIONS: CPT | Performed by: PHYSICAL THERAPY ASSISTANT

## 2019-06-19 NOTE — PROGRESS NOTES
PT DAILY TREATMENT NOTE - [facility] 2-15    Patient Name: Mitchell Guevara  Date:2019  : 1950  [x]  Patient  Verified  Payor: [place] MEDICARE / Plan: [place] MEDICARE PART A & B / Product Type: Medicare /    In time:11:05 am  Out time:12:10 pm  Total Treatment Time (min): 65   Total Timed Codes (min): 55  1:1 Treatment Time ([address] only): 35   Visit #:  3    Treatment Area: Low back pain [M54.5]    SUBJECTIVE  Pain Level (0-10 scale): 0  Any medication changes, allergies to medications, adverse drug reactions, diagnosis change, or new procedure performed?: [x] No    [] Yes (see summary sheet for update)  Subjective functional status/changes:   [] No changes reported  Pt states she is good and has no pain after performing her [place] class yesterday.     OBJECTIVE    Modality rationale: decrease edema, decrease inflammation and decrease pain to improve the patients ability to ambulate   Min Type Additional Details      10 [x] Estim: []Att   [x]Unatt    []TENS instruct                  []IFC  []Premod   []NMES                     []Other:  []w/US   [x]w/ice   []w/heat  Position: prone  Location: R QL       []  Traction: [] Cervical       []Lumbar                       [] Prone          []Supine                       []Intermittent   []Continuous Lbs:  [] before manual  [] after manual  []w/heat    []  Ultrasound: []Continuous   [] Pulsed                       at: []1MHz   []3MHz Location:  W/cm2:    [] Paraffin         Location:   []w/heat    []  Ice     []  Heat  []  Ice massage Position:  Location:    []  Laser  []  Other: Position:  Location:      []  Vasopneumatic Device Pressure:       [] lo [] med [] hi   Temperature:      [x] Skin assessment post-treatment:  [x]intact []redness- no adverse reaction    []redness  adverse reaction:     40 min Therapeutic Exercise:  [x] See flow sheet :   Rationale: increase ROM, increase strength and improve coordination to improve the patients ability to ambulate and transfer      15 min Manual Therapy: passive hip distraction, inferior mobs on femur grade 3, s/l R QL stretch, STM to R QL    Rationale: decrease pain, increase ROM, increase tissue extensibility and decrease trigger points to improve the patients ability to ambulate          With   [] TE   [] TA   [] neuro   [] other: Patient Education: [x] Review HEP    [] Progressed/Changed HEP based on:   [] positioning   [] body mechanics   [] transfers   [] heat/ice application    [] other:      Other Objective/Functional Measures: NT     Pain Level (0-10 scale) post treatment:0    ASSESSMENT/Changes in Function:   Pt continues to feel relief w/ passive hip distraction and tolerated there-ex very well today. Patient will continue to benefit from skilled PT services to modify and progress therapeutic interventions, address functional mobility deficits, address ROM deficits, address strength deficits, analyze and address soft tissue restrictions, analyze and cue movement patterns and analyze and modify body mechanics/ergonomics to attain remaining goals.      []  See Plan of Care  []  See progress note/recertification  []  See Discharge Summary         Progress towards goals / Updated goals:  NT    PLAN  [x]  Upgrade activities as tolerated     [x]  Continue plan of care  [x]  Update interventions per flow sheet       []  Discharge due to:_  []  Other:_      Ludy Lopes, PT, DPT 6/19/2019

## 2019-06-26 ENCOUNTER — HOSPITAL ENCOUNTER (OUTPATIENT)
Dept: PHYSICAL THERAPY | Age: 69
Discharge: HOME | End: 2019-06-26
Payer: MEDICARE

## 2019-06-26 PROCEDURE — 97110 THERAPEUTIC EXERCISES: CPT | Performed by: PHYSICAL THERAPY ASSISTANT

## 2019-06-26 PROCEDURE — 97014 ELECTRIC STIMULATION THERAPY: CPT | Performed by: PHYSICAL THERAPY ASSISTANT

## 2019-06-26 PROCEDURE — 97140 MANUAL THERAPY 1/> REGIONS: CPT | Performed by: PHYSICAL THERAPY ASSISTANT

## 2019-06-26 NOTE — PROGRESS NOTES
PT DAILY TREATMENT NOTE - [facility] 2-15    Patient Name: Jane Half  Date:2019  : 1950  [x]  Patient  Verified  Payor: Andree Sr / Plan: [place] MEDICARE PART A & B / Product Type: Medicare /    In time:11:00 am  Out time:12:10 pm  Total Treatment Time (min): 70   Total Timed Codes (min): 55  1:1 Treatment Time ( only): 40   Visit #:  4    Treatment Area: Low back pain [M54.5]    SUBJECTIVE  Pain Level (0-10 scale): 0  Any medication changes, allergies to medications, adverse drug reactions, diagnosis change, or new procedure performed?: [x] No    [] Yes (see summary sheet for update)  Subjective functional status/changes:   [] No changes reported  Pt states she is a little stiff this morning but felt great for 2 days after last session.     OBJECTIVE    Modality rationale: decrease edema, decrease inflammation and decrease pain to improve the patients ability to ambulate   Min Type Additional Details      15 [x] Estim: []Att   [x]Unatt    []TENS instruct                  []IFC  []Premod   []NMES                     []Other:  []w/US   [x]w/ice   []w/heat  Position: prone  Location: R QL       []  Traction: [] Cervical       []Lumbar                       [] Prone          []Supine                       []Intermittent   []Continuous Lbs:  [] before manual  [] after manual  []w/heat    []  Ultrasound: []Continuous   [] Pulsed                       at: []1MHz   []3MHz Location:  W/cm2:    [] Paraffin         Location:   []w/heat    []  Ice     []  Heat  []  Ice massage Position:  Location:    []  Laser  []  Other: Position:  Location:      []  Vasopneumatic Device Pressure:       [] lo [] med [] hi   Temperature:      [x] Skin assessment post-treatment:  [x]intact []redness- no adverse reaction    []redness  adverse reaction:     40 min Therapeutic Exercise:  [x] See flow sheet :   Rationale: increase ROM, increase strength and improve coordination to improve the patients ability to ambulate and transfer      15 min Manual Therapy: passive hip distraction, inferior mobs on femur grade 3, s/l R QL stretch, STM to R QL    Rationale: decrease pain, increase ROM, increase tissue extensibility and decrease trigger points to improve the patients ability to ambulate          With   [] TE   [] TA   [] neuro   [] other: Patient Education: [x] Review HEP    [] Progressed/Changed HEP based on:   [] positioning   [] body mechanics   [] transfers   [] heat/ice application    [] other:      Other Objective/Functional Measures: NT     Pain Level (0-10 scale) post treatment:0    ASSESSMENT/Changes in Function:   Pt tolerated additional there-ex well w/ no pain. Pt no longer TTP over R QL or SI joint. Pt will attend therapy for 2 more weeks then progress to HEP. Patient will continue to benefit from skilled PT services to modify and progress therapeutic interventions, address functional mobility deficits, address ROM deficits, address strength deficits, analyze and address soft tissue restrictions, analyze and cue movement patterns and analyze and modify body mechanics/ergonomics to attain remaining goals.      []  See Plan of Care  []  See progress note/recertification  []  See Discharge Summary         Progress towards goals / Updated goals:  NT    PLAN  [x]  Upgrade activities as tolerated     [x]  Continue plan of care  [x]  Update interventions per flow sheet       []  Discharge due to:_  []  Other:_      Sigifredo Diaz, PT, DPT, OCS 6/26/2019

## 2019-07-03 ENCOUNTER — APPOINTMENT (OUTPATIENT)
Dept: PHYSICAL THERAPY | Age: 69
End: 2019-07-03
Payer: MEDICARE

## 2019-07-10 ENCOUNTER — HOSPITAL ENCOUNTER (OUTPATIENT)
Dept: PHYSICAL THERAPY | Age: 69
Discharge: HOME | End: 2019-07-10
Payer: MEDICARE

## 2019-07-10 PROCEDURE — 97140 MANUAL THERAPY 1/> REGIONS: CPT | Performed by: PHYSICAL THERAPY ASSISTANT

## 2019-07-10 PROCEDURE — 97110 THERAPEUTIC EXERCISES: CPT | Performed by: PHYSICAL THERAPY ASSISTANT

## 2019-07-10 NOTE — PROGRESS NOTES
PT DAILY TREATMENT NOTE - [facility] 2-15    Patient Name: Gilmar Gordon  Date:7/10/2019  : 1950  [x]  Patient  Verified  Payor: Monda Ganser / Plan: [place] MEDICARE PART A & B / Product Type: Medicare /    In time: 5:00 pm  Out time:6:10 pm  Total Treatment Time (min): 70   Total Timed Codes (min): 60  1:1 Treatment Time ([address] only):60   Visit #:  5    Treatment Area: Low back pain [M54.5]    SUBJECTIVE  Pain Level (0-10 scale): 0  Any medication changes, allergies to medications, adverse drug reactions, diagnosis change, or new procedure performed?: [x] No    [] Yes (see summary sheet for update)  Subjective functional status/changes:   [] No changes reported  Pt states she is doing very well and feels back to normal. Pt has not experienced pain in the past week.     OBJECTIVE    Modality rationale: decrease edema, decrease inflammation and decrease pain to improve the patients ability to ambulate   Min Type Additional Details      NT [x] Estim: []Att   [x]Unatt    []TENS instruct                  []IFC  []Premod   []NMES                     []Other:  []w/US   [x]w/ice   []w/heat  Position: prone  Location: R QL       []  Traction: [] Cervical       []Lumbar                       [] Prone          []Supine                       []Intermittent   []Continuous Lbs:  [] before manual  [] after manual  []w/heat    []  Ultrasound: []Continuous   [] Pulsed                       at: []1MHz   []3MHz Location:  W/cm2:    [] Paraffin         Location:   []w/heat   10 [x]  Ice     []  Heat  []  Ice massage Position: supine  Location: low back    []  Laser  []  Other: Position:  Location:      []  Vasopneumatic Device Pressure:       [] lo [] med [] hi   Temperature:      [x] Skin assessment post-treatment:  [x]intact []redness- no adverse reaction    []redness  adverse reaction:     50 min Therapeutic Exercise:  [x] See flow sheet :   Rationale: increase ROM, increase strength and improve coordination to improve the patients ability to ambulate and transfer      10 min Manual Therapy: passive hip distraction, inferior mobs on femur grade 3, s/l R QL stretch, STM to R QL    Rationale: decrease pain, increase ROM, increase tissue extensibility and decrease trigger points to improve the patients ability to ambulate          With   [] TE   [] TA   [] neuro   [] other: Patient Education: [x] Review HEP    [] Progressed/Changed HEP based on:   [] positioning   [] body mechanics   [] transfers   [] heat/ice application    [] other:      Other Objective/Functional Measures:    Full lumbar ROM w/o pain  Full hip ROM w/o pain    Hip flexion 5/5, hip ER 5/5, hamstrings 5/5     Pain Level (0-10 scale) post treatment:0    ASSESSMENT/Changes in Function:   Pt made very good progress w/ PT over the past 5 sessions and is now painfree w/ all home activities and gym exercises. Pt demonstrates full lumbar ROM and hip mobility. Pt was given updated HEP and educated on contacting MD if questions arise.     []  See Plan of Care  []  See progress note/recertification  [x]  See Discharge Summary         Progress towards goals / Updated goals:  See D/C    PLAN  []  Upgrade activities as tolerated     []  Continue plan of care  []  Update interventions per flow sheet       [x]  Discharge due to:_MET goals  []  Other:_      Carl Toro PT, DPT, OCS 7/10/2019

## 2019-07-15 DIAGNOSIS — Z72.820 SLEEP DEFICIENT: ICD-10-CM

## 2019-07-15 RX ORDER — ATORVASTATIN CALCIUM 10 MG/1
TABLET, FILM COATED ORAL
Qty: 90 TAB | Refills: 1 | Status: SHIPPED | OUTPATIENT
Start: 2019-07-15 | End: 2019-12-20

## 2019-07-15 RX ORDER — TRAZODONE HYDROCHLORIDE 150 MG/1
TABLET ORAL
Qty: 90 TAB | Refills: 0 | Status: SHIPPED | OUTPATIENT
Start: 2019-07-15 | End: 2019-09-23 | Stop reason: SDUPTHER

## 2019-09-23 DIAGNOSIS — Z72.820 SLEEP DEFICIENT: ICD-10-CM

## 2019-09-23 RX ORDER — TRAZODONE HYDROCHLORIDE 150 MG/1
TABLET ORAL
Qty: 90 TAB | Refills: 4 | Status: SHIPPED | OUTPATIENT
Start: 2019-09-23 | End: 2020-11-04

## 2019-10-17 DIAGNOSIS — M81.0 AGE-RELATED OSTEOPOROSIS WITHOUT CURRENT PATHOLOGICAL FRACTURE: ICD-10-CM

## 2019-10-17 RX ORDER — ALENDRONATE SODIUM 70 MG/1
TABLET ORAL
Qty: 12 TAB | Refills: 4 | Status: SHIPPED | OUTPATIENT
Start: 2019-10-17 | End: 2020-10-15

## 2019-11-11 ENCOUNTER — OFFICE VISIT (OUTPATIENT)
Dept: PRIMARY CARE CLINIC | Age: 69
End: 2019-11-11

## 2019-11-11 VITALS
BODY MASS INDEX: 25.03 KG/M2 | OXYGEN SATURATION: 97 % | TEMPERATURE: 98.6 F | HEIGHT: 62 IN | RESPIRATION RATE: 15 BRPM | HEART RATE: 60 BPM | DIASTOLIC BLOOD PRESSURE: 82 MMHG | WEIGHT: 136 LBS | SYSTOLIC BLOOD PRESSURE: 123 MMHG

## 2019-11-11 DIAGNOSIS — Z23 ENCOUNTER FOR IMMUNIZATION: ICD-10-CM

## 2019-11-11 DIAGNOSIS — F51.01 PRIMARY INSOMNIA: ICD-10-CM

## 2019-11-11 DIAGNOSIS — N95.1 MENOPAUSAL VAGINAL DRYNESS: ICD-10-CM

## 2019-11-11 DIAGNOSIS — Z00.00 MEDICARE ANNUAL WELLNESS VISIT, SUBSEQUENT: Primary | ICD-10-CM

## 2019-11-11 DIAGNOSIS — I10 ESSENTIAL HYPERTENSION: ICD-10-CM

## 2019-11-11 DIAGNOSIS — Z71.89 ACP (ADVANCE CARE PLANNING): ICD-10-CM

## 2019-11-11 DIAGNOSIS — E78.2 MIXED HYPERLIPIDEMIA: ICD-10-CM

## 2019-11-11 DIAGNOSIS — Z23 NEED FOR SHINGLES VACCINE: ICD-10-CM

## 2019-11-11 DIAGNOSIS — H93.8X3 EAR FULLNESS, BILATERAL: ICD-10-CM

## 2019-11-11 DIAGNOSIS — Z12.4 CERVICAL CANCER SCREENING: ICD-10-CM

## 2019-11-11 NOTE — PROGRESS NOTES
Chandni Tafoya is a 81 y.o. female and presents for Annual Medicare Wellness Visit. Assessment of cognitive impairment: Alert and oriented x 3     Depression Screen:   3 most recent PHQ Screens 11/11/2019   Little interest or pleasure in doing things Not at all   Feeling down, depressed, irritable, or hopeless Several days   Total Score PHQ 2 1   Trouble falling or staying asleep, or sleeping too much -   Feeling tired or having little energy -   Poor appetite, weight loss, or overeating -   Feeling bad about yourself - or that you are a failure or have let yourself or your family down -   Trouble concentrating on things such as school, work, reading, or watching TV -   Moving or speaking so slowly that other people could have noticed; or the opposite being so fidgety that others notice -   Thoughts of being better off dead, or hurting yourself in some way -   PHQ 9 Score -       Fall Risk Assessment:    Fall Risk Assessment, last 12 mths 11/11/2019   Able to walk? Yes   Fall in past 12 months? No   Fall with injury? -   Number of falls in past 12 months -   Fall Risk Score -       Abuse Screen:   Abuse Screening Questionnaire 11/11/2019   Do you ever feel afraid of your partner? N   Are you in a relationship with someone who physically or mentally threatens you? N   Is it safe for you to go home? Y       Activities of Daily Living:  Self-care.    Requires assistance with: no ADLs  Patient handle his/her own medications  yes Use of pill box  yes  Activities of Daily Living:   ADL Assessment 11/11/2019   Feeding yourself No Help Needed   Getting from bed to chair No Help Needed   Getting dressed No Help Needed   Bathing or showering No Help Needed   Walk across the room (includes cane/walker) No Help Needed   Using the telphone No Help Needed   Taking your medications No Help Needed   Preparing meals No Help Needed   Managing money (expenses/bills) No Help Needed   Moderately strenuous housework (laundry) No Help Needed   Shopping for personal items (toiletries/medicines) No Help Needed   Shopping for groceries No Help Needed   Driving No Help Needed   Climbing a flight of stairs No Help Needed   Getting to places beyond walking distances No Help Needed       Health Maintenance:  Daily Aspirin: yes  Bone Density: up to date  Glaucoma Screening: yes, due in December 2019  Immunizations:    Tetanus: up to date. Influenza: up to date. Shingles: prescription sent to the pharmacy   PPSV-23: up to date. Prevnar-13: up to date. Cancer screening:    Cervical: not interested doing pap anymore. Breast: up to date. Colon: up to date next due to 2022 Prostate:  N/a     Alcohol Risk Screen:   On any occasion during the past 3 months, have you had more than 3 drinks(female) or 4 drinks (male) containing alcohol in one? Yes  Do you average more than 7 drinks (female) or 14 drinks (male) per week? No  Type and amount:1 Glasses of wine    Hearing Loss:  Hearing is good. denies any hearing loss    Vision Loss:   Wears glasses, contact lenses, or have any other visual impairment  yes    Adult Nutrition Screen:  No risk factors noted. Advance Care Planning:   End of Life Planning: has an advanced directive - a copy HAS NOT been provided. ,   Offered [employer] [place] ACP-Facilitator appointment no      Medications/Allergies: Reviewed with patient  Prior to Admission medications    Medication Sig Start Date End Date Taking? Authorizing Provider   varicella-zoster recombinant, PF, (SHINGRIX, PF,) 50 mcg/0.5 mL susr injection 0.5 mL by IntraMUSCular route once for 1 dose.  11/11/19 11/11/19 Yes Angelito Bach MD   alendronate (FOSAMAX) 70 mg tablet TAKE 1 TABLET EVERY 7 DAYS 10/17/19  Yes Angelito Bach MD   traZODone (DESYREL) 150 mg tablet TAKE 1 TABLET NIGHTLY (NEED APPOINTMENT FOR ADDITIONAL REFILLS FOR CHRONIC MEDICAL CARE MANAGEMENT) 9/23/19  Yes Angelito Bach MD   atorvastatin (LIPITOR) 10 mg tablet TAKE 1 TABLET DAILY 7/15/19  Yes Mitzi Eldridge MD   metoprolol tartrate (LOPRESSOR) 25 mg tablet Take 0.5 Tabs by mouth two (2) times a day. 19  Yes Joanna Ramirez MD   aspirin delayed-release 81 mg tablet Take 81 mg by mouth daily. Yes Provider, Historical   calcium 500 mg Tab Take 1 Tab by mouth daily. Yes Provider, Historical   Cholecalciferol, Vitamin D3, (VITAMIN D) 1,000 unit Cap Take 1 Tab by mouth daily. Yes Provider, Historical   fish oil-dha-epa (FISH OIL) 1,200-144-216 mg Cap Take 2 Caps by mouth daily. Yes Provider, Historical   clotrimazole (LOTRIMIN) 1 % topical cream Apply  to affected area two (2) times a day. Patient not taking: Reported on 2019   Marlon GOOD NP   desonide (DESOWEN) 0.05 % topical ointment Apply  to affected area two (2) times a day. Patient not taking: Reported on 2019   Mitzi Eldridge MD       No Known Allergies    Objective:  Visit Vitals  /82 (BP 1 Location: Left arm, BP Patient Position: Sitting)   Pulse 60   Temp 98.6 °F (37 °C) (Oral)   Resp 15   Ht 5' 2\" (1.575 m)   Wt 136 lb (61.7 kg)   SpO2 97%   BMI 24.87 kg/m²    Body mass index is 24.87 kg/m². Problem List: Reviewed with patient and discussed risk factors.     Patient Active Problem List   Diagnosis Code    Arthritis M19.90    Hyperlipidemia E78.5    Mild depression ([facility] [address] F32.0    Primary insomnia F51.01    Essential hypertension I10       PSH: Reviewed with patient  Past Surgical History:   Procedure Laterality Date    ENDOSCOPY, COLON, DIAGNOSTIC  3/7/2012/    repeat in 5 years due family hx    HX GYN          HX ORTHOPAEDIC      rotator cuff tear        SH: Reviewed with patient  Social History     Tobacco Use    Smoking status: Never Smoker    Smokeless tobacco: Never Used   Substance Use Topics    Alcohol use: Yes     Comment: socially    Drug use: No       FH: Reviewed with patient  Family History   Problem Relation Age of Onset    Hypertension Mother    Benjamín Ayala Stroke Mother     Cancer Father         leukemia       Current medical providers:    Patient Care Team:  Rosalinda Harper MD as PCP - General (Internal Medicine)  Rosalinda Harper MD as PCP [facility]  Aristeo Davis MD (Cardiology)    Plan:    Diagnoses and all orders for this visit:    Medicare annual wellness visit, subsequent  Immunization & Health screening discussed with her. ACP (advance care planning)  She has an Advanced directive & will bring a copy for our records. Need for shingles vaccine  -     varicella-zoster recombinant, PF, (SHINGRIX, PF,) 50 mcg/0.5 mL susr injection; 0.5 mL by IntraMUSCular route once for 1 dose., Normal, Disp-0.5 mL, R-0    Encounter for immunization  -     INFLUENZA VACCINE INACTIVATED (IIV), SUBUNIT, ADJUVANTED, IM  -     ADMIN INFLUENZA VIRUS VAC    Orders Placed This Encounter    Influenza Vaccine Inactivated (IIV)(FLUAD), Subunit, Adjuvanted, IM, (33529)    LIPID PANEL    CBC WITH AUTOMATED DIFF    METABOLIC PANEL, COMPREHENSIVE    Administration fee () for Medicare insured patients    varicella-zoster recombinant, PF, (SHINGRIX, PF,) 50 mcg/0.5 mL susr injection       Health Maintenance   Topic Date Due    Shingrix Vaccine Age 49> (1 of 2) 01/04/2002    MEDICARE YEARLY EXAM  11/09/2019    GLAUCOMA SCREENING Q2Y  12/10/2019 (Originally 1/4/2017)    BREAST CANCER SCRN MAMMOGRAM  08/20/2021    DTaP/Tdap/Td series (2 - Td) 07/24/2023    Hepatitis C Screening  Completed    Bone Densitometry (Dexa) Screening  Completed    Influenza Age 5 to Adult  Completed    Pneumococcal 65+ years  Completed          Urinary/ Fecal Incontinence: None. Regular physical exercise: goes to Gym 3-4 times a week. Patient verbalized understanding of information presented. AVS and Medicare Part B Preventive Services Table printed and given to pt and reviewed. See table for findings under Recommendation and Scheduled.  All of the patient's questions were answered. Progress Note    Name: Cayden Blum Date: 2019  Ethnicity: NON-  Race: WHITE OR   MRN: [id number]  Age: 81 y.o.  : 1950  Sex: Female       HPI:   Kaitlynn Houston. Pradeep Garrido is a 81y.o. year old female who presents today for follow up on her chronic conditions as well. She has seen Miki Tucker twice for her depression and Anxiety. She is happy with the progress she has made so far. Still needs Trazadone for sleep. Her blood pressure has been in normal range & palpitations under control with Lopressor. Lately feeling fullness in her both ears. No pain or discharge. She has been having Vaginal dryness lately & Vagifem is not helping. She is due for her pap & would like to see a Gynecologist as well. She is fasting for her labs today. Visit Vitals  /82 (BP 1 Location: Left arm, BP Patient Position: Sitting)   Pulse 60   Temp 98.6 °F (37 °C) (Oral)   Resp 15   Ht 5' 2\" (1.575 m)   Wt 136 lb (61.7 kg)   SpO2 97%   BMI 24.87 kg/m²     Review of Systems   Constitutional: negative  for malaise and night sweats  Eyes: negative for visual disturbance and redness  Ears, nose, mouth, throat, and face: negative for nasal congestion, +ve for ear fullness  Respiratory: negative for hemoptysis, pleurisy/chest pain or wheezing  Cardiovascular: negative for chest pain, irregular heart beats  Gastrointestinal: negative for change in bowel habits, melena and diarrhea  Musculoskeletal:negative  for arthralgias and  stiff joints   Neurological:  negative for dizziness and headaches. Behavioral/Psych: negative for aggressive behavior and behavior problems    Physical Examination     General:  Alert, cooperative, no distress, appears stated age. Head:  Normocephalic, without obvious abnormality, atraumatic. Eyes:  Conjunctivae/corneas clear. PERRL, EOMs intact. Ears:  Normal TMs and external ear canals both ears. Nose: Nares normal. Septum midline.  Mucosa normal. No drainage or sinus tenderness. Throat: Lips, mucosa, and tongue normal. Teeth and gums normal.   Neck: Supple, symmetrical, trachea midline, no adenopathy, thyroid: no enlargement/tenderness/nodules, no carotid bruit and no JVD. Back:   Symmetric, no curvature. ROM normal. No CVA tenderness. Lungs:   Clear to auscultation bilaterally. Heart:  Regular rate and rhythm, S1, S2 normal, no murmur       Abdomen:   Soft, non-tender. Bowel sounds normal. No masses,  No organomegaly. Extremities: Extremities normal, atraumatic, no cyanosis or edema. Pulses: 2+ and symmetric all extremities. Skin: Skin color, texture, turgor normal. No rashes or lesions. Lymph nodes: Cervical, supraclavicular, and axillary nodes normal.   Neurologic: CNII-XII intact. Normal strength, sensation and reflexes throughout. Assessment/Plan   Diagnoses and all orders for this visit:    Essential hypertension  Continue Lopressor.  -     CBC WITH AUTOMATED DIFF  -     METABOLIC PANEL, COMPREHENSIVE    Primary insomnia  Doing well on Trazadone. Mixed hyperlipidemia  Continue same dose lipitor until we get the lab results back. -     LIPID PANEL    Ear fullness, bilateral  Told her most likely due to the allergies. If anti histamine does not help will refer to ENT.     Menopausal vaginal dryness  -     REFERRAL TO OBSTETRICS AND GYNECOLOGY    Cervical cancer screening  -     REFERRAL TO OBSTETRICS AND GYNECOLOGY        Peggy Eason MD  11/16/2019  8:24 AM

## 2019-11-11 NOTE — PROGRESS NOTES
1. Have you been to the ER, urgent care clinic since your last visit? Hospitalized since your last visit? No    2. Have you seen or consulted any other health care providers outside of the [address] [place] [address] since your last visit? Include any pap smears or colon screening. No     Chief Complaint   Patient presents with   [facility] Annual Wellness Visit     Not fasting    Flu vaccine: patient would like one done today. Shingrix: Has not had it done. Patient presents for routine immunizations. Patient denies any symptoms , reactions or allergies that would exclude them from being immunized today. After obtaining written consent, and per verbal orders of Dr. Sarah Milligan, injection of Fluad 65 years and up ordered, signed, and given. Risks and adverse reactions were discussed and the VIS was given to them. All questions were addressed. Patient was observed for 15 minutes post injection. There were no reactions observed at this time.  Advised patient to call with any concerns or signs and symptoms of adverse reaction.      Trenton Gowers, LPN

## 2019-11-11 NOTE — PATIENT INSTRUCTIONS
Vaccine Information Statement Influenza (Flu) Vaccine (Inactivated or Recombinant): What You Need to Know Many Vaccine Information Statements are available in English and other languages. See www.immunize.org/vis Hojas de información sobre vacunas están disponibles en español y en muchos otros idiomas. Visite www.immunize.org/vis 1. Why get vaccinated? Influenza vaccine can prevent influenza (flu). Flu is a contagious disease that spreads around the [place] [facility] every year, usually between October and May. Anyone can get the flu, but it is more dangerous for some people. Infants and young children, people 72years of age and older, pregnant women, and people with certain health conditions or a weakened immune system are at greatest risk of flu complications. Pneumonia, bronchitis, sinus infections and ear infections are examples of flu-related complications. If you have a medical condition, such as heart disease, cancer or diabetes, flu can make it worse. Flu can cause fever and chills, sore throat, muscle aches, fatigue, cough, headache, and runny or stuffy nose. Some people may have vomiting and diarrhea, though this is more common in children than adults. Each year thousands of people in the [facility] die from flu, and many more are hospitalized. Flu vaccine prevents millions of illnesses and flu-related visits to the doctor each year. 2. Influenza vaccines CDC recommends everyone 10months of age and older get vaccinated every flu season. Children 6 months through 6years of age may need 2 doses during a single flu season. Everyone else needs only 1 dose each flu season. It takes about 2 weeks for protection to develop after vaccination. There are many flu viruses, and they are always changing. Each year a new flu vaccine is made to protect against three or four viruses that are likely to cause disease in the upcoming flu season.  Even when the vaccine doesnt exactly match these viruses, it may still provide some protection. Influenza vaccine does not cause flu. Influenza vaccine may be given at the same time as other vaccines. 3. Talk with your health care provider Tell your vaccine provider if the person getting the vaccine: 
 Has had an allergic reaction after a previous dose of influenza vaccine, or has any severe, life-threatening allergies.  Has ever had Guillain-Barré Syndrome (also called GBS). In some cases, your health care provider may decide to postpone influenza vaccination to a future visit. People with minor illnesses, such as a cold, may be vaccinated. People who are moderately or severely ill should usually wait until they recover before getting influenza vaccine. Your health care provider can give you more information. 4. Risks of a reaction  Soreness, redness, and swelling where shot is given, fever, muscle aches, and headache can happen after influenza vaccine.  There may be a very small increased risk of Guillain-Barré Syndrome (GBS) after inactivated influenza vaccine (the flu shot). Estefani Grimm children who get the flu shot along with pneumococcal vaccine (PCV13), and/or DTaP vaccine at the same time might be slightly more likely to have a seizure caused by fever. Tell your health care provider if a child who is getting flu vaccine has ever had a seizure. People sometimes faint after medical procedures, including vaccination. Tell your provider if you feel dizzy or have vision changes or ringing in the ears. As with any medicine, there is a very remote chance of a vaccine causing a severe allergic reaction, other serious injury, or death. 5. What if there is a serious problem? An allergic reaction could occur after the vaccinated person leaves the clinic.  If you see signs of a severe allergic reaction (hives, swelling of the face and throat, difficulty breathing, a fast heartbeat, dizziness, or weakness), call 9-1-1 and get the person to the nearest hospital. 
 
For other signs that concern you, call your health care provider. Adverse reactions should be reported to the Vaccine Adverse Event Reporting System (VAERS). Your health care provider will usually file this report, or you can do it yourself. Visit the VAERS website at www.vaers. hhs.gov or call 2-818.423.9353. VAERS is only for reporting reactions, and VAERS staff do not give medical advice. 6. The National Vaccine Injury Compensation Program 
 
The [facility] Vaccine Injury Compensation Program (VICP) is a federal program that was created to compensate people who may have been injured by certain vaccines. Visit the VICP website at www.hrsa.gov/vaccinecompensation or call 0-234.363.3797 to learn about the program and about filing a claim. There is a time limit to file a claim for compensation. 7. How can I learn more?  Ask your health care provider.  Call your local or state health department.  Contact the Centers for Disease Control and Prevention (CDC): 
- Call 0-339.995.6600 (2-261-ZHR-INFO) or 
- Visit CDCs influenza website at www.cdc.gov/flu Vaccine Information Statement (Interim) Inactivated Influenza Vaccine 8/15/2019 
[address] 378WS-[id number] Department of Health and [employer] Centers for Disease Control and Prevention Office Use Only

## 2019-11-13 LAB
ALBUMIN SERPL-MCNC: 4.4 G/DL (ref 3.6–4.8)
ALBUMIN/GLOB SERPL: 1.8 {RATIO} (ref 1.2–2.2)
ALP SERPL-CCNC: 68 IU/L (ref 39–117)
ALT SERPL-CCNC: 24 IU/L (ref 0–32)
AST SERPL-CCNC: 29 IU/L (ref 0–40)
BASOPHILS # BLD AUTO: 0 X10E3/UL (ref 0–0.2)
BASOPHILS NFR BLD AUTO: 1 %
BILIRUB SERPL-MCNC: 1.7 MG/DL (ref 0–1.2)
BUN SERPL-MCNC: 8 MG/DL (ref 8–27)
BUN/CREAT SERPL: 10 (ref 12–28)
CALCIUM SERPL-MCNC: 9.2 MG/DL (ref 8.7–10.3)
CHLORIDE SERPL-SCNC: 103 MMOL/L (ref 96–106)
CHOLEST SERPL-MCNC: 145 MG/DL (ref 100–199)
CO2 SERPL-SCNC: 23 MMOL/L (ref 20–29)
CREAT SERPL-MCNC: 0.83 MG/DL (ref 0.57–1)
EOSINOPHIL # BLD AUTO: 0.1 X10E3/UL (ref 0–0.4)
EOSINOPHIL NFR BLD AUTO: 3 %
ERYTHROCYTE [DISTWIDTH] IN BLOOD BY AUTOMATED COUNT: 12.4 % (ref 12.3–15.4)
GLOBULIN SER CALC-MCNC: 2.4 G/DL (ref 1.5–4.5)
GLUCOSE SERPL-MCNC: 91 MG/DL (ref 65–99)
HCT VFR BLD AUTO: 42 % (ref 34–46.6)
HDLC SERPL-MCNC: 51 MG/DL
HGB BLD-MCNC: 14.3 G/DL (ref 11.1–15.9)
IMM GRANULOCYTES # BLD AUTO: 0 X10E3/UL (ref 0–0.1)
IMM GRANULOCYTES NFR BLD AUTO: 0 %
LDLC SERPL CALC-MCNC: 75 MG/DL (ref 0–99)
LYMPHOCYTES # BLD AUTO: 0.9 X10E3/UL (ref 0.7–3.1)
LYMPHOCYTES NFR BLD AUTO: 21 %
MCH RBC QN AUTO: 31.6 PG (ref 26.6–33)
MCHC RBC AUTO-ENTMCNC: 34 G/DL (ref 31.5–35.7)
MCV RBC AUTO: 93 FL (ref 79–97)
MONOCYTES # BLD AUTO: 0.4 X10E3/UL (ref 0.1–0.9)
MONOCYTES NFR BLD AUTO: 10 %
NEUTROPHILS # BLD AUTO: 2.7 X10E3/UL (ref 1.4–7)
NEUTROPHILS NFR BLD AUTO: 65 %
PLATELET # BLD AUTO: 196 X10E3/UL (ref 150–450)
POTASSIUM SERPL-SCNC: 3.8 MMOL/L (ref 3.5–5.2)
PROT SERPL-MCNC: 6.8 G/DL (ref 6–8.5)
RBC # BLD AUTO: 4.53 X10E6/UL (ref 3.77–5.28)
SODIUM SERPL-SCNC: 141 MMOL/L (ref 134–144)
TRIGL SERPL-MCNC: 97 MG/DL (ref 0–149)
VLDLC SERPL CALC-MCNC: 19 MG/DL (ref 5–40)
WBC # BLD AUTO: 4.2 X10E3/UL (ref 3.4–10.8)

## 2019-12-04 RX ORDER — METOPROLOL TARTRATE 25 MG/1
TABLET, FILM COATED ORAL
Qty: 90 TAB | Refills: 4 | Status: SHIPPED | OUTPATIENT
Start: 2019-12-04 | End: 2021-01-15

## 2019-12-04 NOTE — TELEPHONE ENCOUNTER
Cardiologist: Dr. Janett Yu    Last appt: 6/13/2019  Future Appointments   Date Time Provider Yoli Granadosi   6/16/2020 10:00 AM Annel Sweeney  [address]       Requested Prescriptions     Signed Prescriptions Disp Refills    metoprolol tartrate (LOPRESSOR) 25 mg tablet 90 Tab 4     Sig: TAKE ONE-HALF (1/2) TABLET TWICE A DAY     Authorizing Provider: KARINA RAMIREZ     Ordering User: ALLAN LUJAN         Refills VO per Dr. Janett Yu.

## 2019-12-20 RX ORDER — ATORVASTATIN CALCIUM 10 MG/1
TABLET, FILM COATED ORAL
Qty: 90 TAB | Refills: 4 | Status: SHIPPED | OUTPATIENT
Start: 2019-12-20 | End: 2021-01-15

## 2020-01-09 NOTE — PROGRESS NOTES
[facility]   [address] [facility], [address]  Phone: [phone] Fax: [phone]      Discharge Summary 2-15      Patient name: Karon Jha  : 1950  Provider#: [id number]  Referral source: Alpesh Beltran MD      Medical/Treatment Diagnosis: Low back pain [M54.5]     Prior Hospitalization: see medical history     Comorbidities: see chart  Prior Level of Function:see chart  Medications: Verified on Patient Summary List    Start of Care: 6/3/19      Onset Date:few months prior to eval   Visits from Start of Care: 5     Missed Visits: 0  Reporting Period : 6/3/19 to 7/10/19    Objective/Functional Measures:     Full lumbar ROM w/o pain  Full hip ROM w/o pain     Hip flexion 5/5, hip ER 5/5, hamstrings 5/5                 Pain Level (0-10 scale) post treatment:0     ASSESSMENT/Changes in Function:   Pt made very good progress w/ PT over the past 5 sessions and is now painfree w/ all home activities and gym exercises. Pt demonstrates full lumbar ROM and hip mobility. Pt was given updated HEP and educated on contacting MD if questions arise.       RECOMMENDATIONS:  [x]Discontinue therapy: [x]Patient has reached or is progressing toward set goals     []Patient is non-compliant or has abdicated     []Due to lack of appreciable progress towards set goals    Maryjo De Jesus, PT, DPT, OCS 2020

## 2020-06-12 NOTE — PROGRESS NOTES
Cardiac Electrophysiology VIRTUAL VISIT Note   Pursuant to the emergency declaration under the [facility] waiver authority and the [employer] Act, this Virtual  Visit was conducted, with patient's consent, to reduce the patient's risk of exposure to COVID-19 and provide continuity of care for an established patient. Services were provided through a video synchronous discussion virtually to substitute for in-person clinic visit. Subjective:      Damien Molina is a 78 y.o. patient who is seen virtually via synchronous video for follow up of palpitations, PSVT, PACs, PVCs. Intolerant of diltiazem, switched to metoprolol. Noted dizziness & fatigue with 25 mg po bid dosing, decreased to 12.5 mg po bid & feels she's doing well on this dose. She denies chest pain, palpitations, SOB, PND, orthopnea, lightheadedness, syncope, or edema. Reports BP well controlled. Previous:  Echo (11/06/2018): LVEF 59%, no RWMA, grade 1 diastolic dysfunction. Prominent Chiari network in RA. Trivial TR. Trivial PA. Loop monitor (10/2018): Few PACs, 50 PVCs, & single 7 beat run PSVT. Palpitations x several years. Referred from Dr. Dami Guardado office. Previous loop monitor significant for sinus tachycardia both with & without LBBB. 1700 PVCs on holter in 2009. Stress echo normal then.        Patient Active Problem List   Diagnosis Code    Arthritis M19.90    Hyperlipidemia E78.5    Mild depression ([facility] [address] F32.0    Primary insomnia F51.01    Essential hypertension I10     Current Outpatient Medications   Medication Sig Dispense Refill    atorvastatin (LIPITOR) 10 mg tablet TAKE 1 TABLET DAILY 90 Tab 4    metoprolol tartrate (LOPRESSOR) 25 mg tablet TAKE ONE-HALF (1/2) TABLET TWICE A DAY 90 Tab 4    alendronate (FOSAMAX) 70 mg tablet TAKE 1 TABLET EVERY 7 DAYS 12 Tab 4    traZODone (DESYREL) 150 mg tablet TAKE 1 TABLET NIGHTLY (NEED APPOINTMENT FOR ADDITIONAL REFILLS FOR CHRONIC MEDICAL CARE MANAGEMENT) 90 Tab 4    aspirin delayed-release 81 mg tablet Take 81 mg by mouth daily.  Cholecalciferol, Vitamin D3, (VITAMIN D) 1,000 unit Cap Take 1 Tab by mouth daily.  fish oil-dha-epa (FISH OIL) 1,200-144-216 mg Cap Take 2 Caps by mouth daily.  calcium 500 mg Tab Take 1 Tab by mouth daily. No Known Allergies  Past Medical History:   Diagnosis Date    Arthritis     Hypercholesterolemia     Sun-damaged skin     Sunburn, blistering      Past Surgical History:   Procedure Laterality Date    ENDOSCOPY, COLON, DIAGNOSTIC  3/7/2012/    repeat in 5 years due family hx    HX GYN          HX ORTHOPAEDIC      rotator cuff tear     Family History   Problem Relation Age of Onset    Hypertension Mother     Stroke Mother     Cancer Father         leukemia     Social History     Tobacco Use    Smoking status: Never Smoker    Smokeless tobacco: Never Used   Substance Use Topics    Alcohol use: Yes     Comment: socially        Review of Systems:   Constitutional: Negative for fever, chills, weight loss, malaise/fatigue. HEENT: Negative for nosebleeds, vision changes. Respiratory: Negative for cough, hemoptysis  Cardiovascular: Negative for chest pain, palpitations, no orthopnea, claudication, leg swelling, syncope, and PND. Gastrointestinal: Negative for nausea, vomiting, diarrhea, blood in stool and melena. Genitourinary: Negative for dysuria, and hematuria. Musculoskeletal: Negative for myalgias, arthralgia. Skin: Negative for rash. Heme: Does not bleed or bruise easily. Neurological: Negative for speech change and focal weakness. Objective:   Due to this being a TeleHealth evaluation, many elements of the physical examination are unable to be assessed. General: Well developed, in no acute distress, cooperative and alert  HEENT: Pupils equal/round.  No marked JVD visible on video. Respiratory: No audible wheezing, no signs of respiratory distress, lips non cyanotic. Extremities:  No edema  Neuro: A&Ox3, speech clear, no facial droop, answering questions appropriately  Skin: Skin color is normal. No rashes or lesions. Non diaphoretic on visible skin during exam.      Assessment/Plan:       ICD-10-CM ICD-9-CM    1. Intermittent palpitations R00.2 785.1    2. PSVT (paroxysmal supraventricular tachycardia) (HCC) I47.1 427.0    3. PAC (premature atrial contraction) I49.1 427.61    4. PVC (premature ventricular contraction) I49.3 427.69       Ms. Giron states palpitations are overall well controlled, feels that metoprolol 12.5 mg po bid isn't causing undesirable side effects at this dose. Discussed that ablation would potentially be an option in the future if she is unable to tolerate metoprolol or develops side effects. If she notes a day where palpitations are more prominent, she may take an extra 12.5 mg of metoprolol prn. Otherwise, continue 12.5 mg po bid. BP well controlled. Follow up with Dr. Marian Pugh in 1 year. She will call sooner for intolerance to medication or worsening symptoms. No future appointments. We discussed the expected course, resolution and complications of the diagnosis(es) in detail. Medication risks, benefits, costs, interactions, and alternatives were discussed as indicated. I advised her to contact the office if her condition worsens, changes or fails to improve as anticipated. She expressed understanding with the diagnosis(es) and plan. Patient was made aware and verbalized understanding that an appointment will be scheduled for them for a virtual visit and/or office visit within the above time frame. Patient understanding his/her responsibility to call and change time/date if he/she so chooses. Thank you for involving me in this patient's care and please call with further concerns or questions.       Kraig Rivera, FNP-C  [facility]  06/15/20

## 2020-06-15 ENCOUNTER — VIRTUAL VISIT (OUTPATIENT)
Dept: CARDIOLOGY CLINIC | Age: 70
End: 2020-06-15

## 2020-06-15 DIAGNOSIS — R00.2 INTERMITTENT PALPITATIONS: Primary | ICD-10-CM

## 2020-06-15 DIAGNOSIS — I47.1 PSVT (PAROXYSMAL SUPRAVENTRICULAR TACHYCARDIA) (HCC): ICD-10-CM

## 2020-06-15 DIAGNOSIS — I49.1 PAC (PREMATURE ATRIAL CONTRACTION): ICD-10-CM

## 2020-06-15 DIAGNOSIS — I49.3 PVC (PREMATURE VENTRICULAR CONTRACTION): ICD-10-CM

## 2020-06-17 ENCOUNTER — TELEPHONE (OUTPATIENT)
Dept: PRIMARY CARE CLINIC | Age: 70
End: 2020-06-17

## 2020-06-17 NOTE — TELEPHONE ENCOUNTER
Called the [facility] that patient received her shingles vaccine and requested that they send us the documentation of the vaccine. Spoke with [place] the pharmacist and they are going to fax the documentation. They are going to send us the documentation.

## 2020-11-12 ENCOUNTER — OFFICE VISIT (OUTPATIENT)
Dept: PRIMARY CARE CLINIC | Age: 70
End: 2020-11-12
Payer: MEDICARE

## 2020-11-12 VITALS
DIASTOLIC BLOOD PRESSURE: 85 MMHG | TEMPERATURE: 98.4 F | OXYGEN SATURATION: 96 % | HEIGHT: 62 IN | RESPIRATION RATE: 16 BRPM | HEART RATE: 73 BPM | BODY MASS INDEX: 25.65 KG/M2 | SYSTOLIC BLOOD PRESSURE: 118 MMHG | WEIGHT: 139.4 LBS

## 2020-11-12 DIAGNOSIS — F51.01 PRIMARY INSOMNIA: ICD-10-CM

## 2020-11-12 DIAGNOSIS — M25.561 CHRONIC PAIN OF RIGHT KNEE: ICD-10-CM

## 2020-11-12 DIAGNOSIS — E78.2 MIXED HYPERLIPIDEMIA: ICD-10-CM

## 2020-11-12 DIAGNOSIS — Z12.11 COLON CANCER SCREENING: ICD-10-CM

## 2020-11-12 DIAGNOSIS — M81.0 AGE-RELATED OSTEOPOROSIS WITHOUT CURRENT PATHOLOGICAL FRACTURE: ICD-10-CM

## 2020-11-12 DIAGNOSIS — G89.29 CHRONIC PAIN OF RIGHT KNEE: ICD-10-CM

## 2020-11-12 DIAGNOSIS — I10 ESSENTIAL HYPERTENSION: ICD-10-CM

## 2020-11-12 DIAGNOSIS — Z00.00 MEDICARE ANNUAL WELLNESS VISIT, SUBSEQUENT: Primary | ICD-10-CM

## 2020-11-12 DIAGNOSIS — Z71.89 ACP (ADVANCE CARE PLANNING): ICD-10-CM

## 2020-11-12 PROBLEM — F32.A MILD DEPRESSION: Status: RESOLVED | Noted: 2018-10-11 | Resolved: 2020-11-12

## 2020-11-12 LAB
ALBUMIN SERPL-MCNC: 4.4 G/DL (ref 3.5–5)
ALBUMIN/GLOB SERPL: 1.4 {RATIO} (ref 1.1–2.2)
ALP SERPL-CCNC: 84 U/L (ref 45–117)
ALT SERPL-CCNC: 39 U/L (ref 12–78)
ANION GAP SERPL CALC-SCNC: 6 MMOL/L (ref 5–15)
AST SERPL-CCNC: 35 U/L (ref 15–37)
BILIRUB SERPL-MCNC: 1.9 MG/DL (ref 0.2–1)
BUN SERPL-MCNC: 9 MG/DL (ref 6–20)
BUN/CREAT SERPL: 12 (ref 12–20)
CALCIUM SERPL-MCNC: 9.4 MG/DL (ref 8.5–10.1)
CHLORIDE SERPL-SCNC: 104 MMOL/L (ref 97–108)
CHOLEST SERPL-MCNC: 174 MG/DL
CO2 SERPL-SCNC: 30 MMOL/L (ref 21–32)
CREAT SERPL-MCNC: 0.74 MG/DL (ref 0.55–1.02)
ERYTHROCYTE [DISTWIDTH] IN BLOOD BY AUTOMATED COUNT: 12 % (ref 11.5–14.5)
GLOBULIN SER CALC-MCNC: 3.2 G/DL (ref 2–4)
GLUCOSE SERPL-MCNC: 86 MG/DL (ref 65–100)
HCT VFR BLD AUTO: 42.6 % (ref 35–47)
HDLC SERPL-MCNC: 49 MG/DL
HDLC SERPL: 3.6 {RATIO} (ref 0–5)
HGB BLD-MCNC: 14.4 G/DL (ref 11.5–16)
LDLC SERPL CALC-MCNC: 95.8 MG/DL (ref 0–100)
LIPID PROFILE,FLP: NORMAL
MCH RBC QN AUTO: 31.9 PG (ref 26–34)
MCHC RBC AUTO-ENTMCNC: 33.8 G/DL (ref 30–36.5)
MCV RBC AUTO: 94.2 FL (ref 80–99)
NRBC # BLD: 0 K/UL (ref 0–0.01)
NRBC BLD-RTO: 0 PER 100 WBC
PLATELET # BLD AUTO: 203 K/UL (ref 150–400)
PMV BLD AUTO: 10.8 FL (ref 8.9–12.9)
POTASSIUM SERPL-SCNC: 4.1 MMOL/L (ref 3.5–5.1)
PROT SERPL-MCNC: 7.6 G/DL (ref 6.4–8.2)
RBC # BLD AUTO: 4.52 M/UL (ref 3.8–5.2)
SODIUM SERPL-SCNC: 140 MMOL/L (ref 136–145)
TRIGL SERPL-MCNC: 146 MG/DL (ref ?–150)
VLDLC SERPL CALC-MCNC: 29.2 MG/DL
WBC # BLD AUTO: 5.7 K/UL (ref 3.6–11)

## 2020-11-12 PROCEDURE — G9711 PT HX TOT COL OR COLON CA: HCPCS | Performed by: INTERNAL MEDICINE

## 2020-11-12 PROCEDURE — G0439 PPPS, SUBSEQ VISIT: HCPCS | Performed by: INTERNAL MEDICINE

## 2020-11-12 PROCEDURE — G8752 SYS BP LESS 140: HCPCS | Performed by: INTERNAL MEDICINE

## 2020-11-12 PROCEDURE — 1101F PT FALLS ASSESS-DOCD LE1/YR: CPT | Performed by: INTERNAL MEDICINE

## 2020-11-12 PROCEDURE — 99214 OFFICE O/P EST MOD 30 MIN: CPT | Performed by: INTERNAL MEDICINE

## 2020-11-12 PROCEDURE — G8427 DOCREV CUR MEDS BY ELIG CLIN: HCPCS | Performed by: INTERNAL MEDICINE

## 2020-11-12 PROCEDURE — G8536 NO DOC ELDER MAL SCRN: HCPCS | Performed by: INTERNAL MEDICINE

## 2020-11-12 PROCEDURE — G8754 DIAS BP LESS 90: HCPCS | Performed by: INTERNAL MEDICINE

## 2020-11-12 PROCEDURE — G8419 CALC BMI OUT NRM PARAM NOF/U: HCPCS | Performed by: INTERNAL MEDICINE

## 2020-11-12 PROCEDURE — 1090F PRES/ABSN URINE INCON ASSESS: CPT | Performed by: INTERNAL MEDICINE

## 2020-11-12 PROCEDURE — G9717 DOC PT DX DEP/BP F/U NT REQ: HCPCS | Performed by: INTERNAL MEDICINE

## 2020-11-12 PROCEDURE — G9899 SCRN MAM PERF RSLTS DOC: HCPCS | Performed by: INTERNAL MEDICINE

## 2020-11-12 RX ORDER — DICLOFENAC SODIUM 10 MG/G
2 GEL TOPICAL 4 TIMES DAILY
Qty: 100 G | Refills: 1 | Status: SHIPPED | OUTPATIENT
Start: 2020-11-12 | End: 2021-01-04

## 2020-11-12 NOTE — PROGRESS NOTES
Valeriy Barksdale is a 78 y.o. female and presents for Annual Medicare Wellness Visit. Assessment of cognitive impairment: Alert and oriented x 3    Depression Screen:   3 most recent PHQ Screens 11/11/2019   Little interest or pleasure in doing things Not at all   Feeling down, depressed, irritable, or hopeless Several days   Total Score PHQ 2 1   Trouble falling or staying asleep, or sleeping too much -   Feeling tired or having little energy -   Poor appetite, weight loss, or overeating -   Feeling bad about yourself - or that you are a failure or have let yourself or your family down -   Trouble concentrating on things such as school, work, reading, or watching TV -   Moving or speaking so slowly that other people could have noticed; or the opposite being so fidgety that others notice -   Thoughts of being better off dead, or hurting yourself in some way -   PHQ 9 Score -       Fall Risk Assessment:    Fall Risk Assessment, last 12 mths 11/12/2020   Able to walk? Yes   Fall in past 12 months? No   Fall with injury? -   Number of falls in past 12 months -   Fall Risk Score -       Abuse Screen:   Abuse Screening Questionnaire 11/11/2019   Do you ever feel afraid of your partner? N   Are you in a relationship with someone who physically or mentally threatens you? N   Is it safe for you to go home?  Y       Activities of Daily Living:  Self care  Requires assistance with: none  Patient handle his/her own medications  yes Use of pill box  yes  Activities of Daily Living:   ADL Assessment 11/12/2020   Feeding yourself No Help Needed   Getting from bed to chair No Help Needed   Getting dressed No Help Needed   Bathing or showering No Help Needed   Walk across the room (includes cane/walker) No Help Needed   Using the telphone No Help Needed   Taking your medications No Help Needed   Preparing meals No Help Needed   Managing money (expenses/bills) No Help Needed   Moderately strenuous housework (laundry) No Help Needed   Shopping for personal items (toiletries/medicines) No Help Needed   Shopping for groceries No Help Needed   Driving No Help Needed   Climbing a flight of stairs No Help Needed   Getting to places beyond walking distances No Help Needed       Health Maintenance:  Daily Aspirin: yes  Bone Density: 1/2/2019  Glaucoma Screening: scheduled for december  Immunizations:    Tetanus: next due 7/24/2023  Influenza: complete  Shingles: complete 6/15/2020  PPSV-23: complete 11/8/2018 Prevnar-13: 8/16/2013 completed    Cancer screening:    Cervical: no longer screening  Breast: next due 8/20/2021Colon: up to date in 2017 , will do FIT test     Alcohol Risk Screen:   On any occasion during the past 3 months, have you had more than 3 drinks(female) or 4 drinks (male) containing alcohol in one? yes  Do you average more than 7 drinks (female) or 14 drinks (male) per week?  no  Type and amount: gingerbeer or wine but not every day  Hearing Loss:  Does not wear hearing aids and no loss noted    Vision Loss:   Wears glasses, yes all the time    Adult Nutrition Screen:  Not diabetic and no risk noted    Advance Care Planning:   End of Life Planning: advance care plan done but we  do not have copy  Ar[address] ACP-Facilitator appointment no      Medications/Allergies: Reviewed with patient  Prior to Admission medications    Medication Sig Start Date End Date Taking? Authorizing Provider   traZODone (DESYREL) 150 mg tablet TAKE 1 TABLET NIGHTLY (NEED APPOINTMENT FOR ADDITIONAL REFILLS FOR CHRONIC MEDICAL CARE MANAGEMENT) 11/4/20  Yes Carolina Roberts MD   alendronate (FOSAMAX) 70 mg tablet TAKE 1 TABLET EVERY 7 DAYS 10/15/20  Yes Carolina Roberts MD   atorvastatin (LIPITOR) 10 mg tablet TAKE 1 TABLET DAILY 12/20/19  Yes Carolina Roberts MD   metoprolol tartrate (LOPRESSOR) 25 mg tablet TAKE ONE-HALF (1/2) TABLET TWICE A DAY 12/4/19  Yes Perry Deng MD   aspirin delayed-release 81 mg tablet Take 81 mg by mouth daily. Yes Provider, Historical   calcium 500 mg Tab Take 1 Tab by mouth daily. Yes Provider, Historical   Cholecalciferol, Vitamin D3, (VITAMIN D) 1,000 unit Cap Take 1 Tab by mouth daily. Yes Provider, Historical   fish oil-dha-epa (FISH OIL) 1,200-144-216 mg Cap Take 2 Caps by mouth daily. Yes Provider, Historical       No Known Allergies    Objective:  Visit Vitals  /85 (BP 1 Location: Right arm, BP Patient Position: Sitting)   Pulse 73   Temp 98.4 °F (36.9 °C) (Oral)   Resp 16   Ht 5' 2\" (1.575 m)   Wt 139 lb 6.4 oz (63.2 kg)   SpO2 96%   BMI 25.50 kg/m²    Body mass index is 25.5 kg/m². Problem List: Reviewed with patient and discussed risk factors. Patient Active Problem List   Diagnosis Code    Arthritis M19.90    Hyperlipidemia E78.5    Mild depression ([facility] [address] F32.0    Primary insomnia F51.01    Essential hypertension I10       PSH: Reviewed with patient  Past Surgical History:   Procedure Laterality Date    ENDOSCOPY, COLON, DIAGNOSTIC  3/7/2012/    repeat in 5 years due family hx    HX GYN          HX ORTHOPAEDIC      rotator cuff tear        SH: Reviewed with patient  Social History     Tobacco Use    Smoking status: Never Smoker    Smokeless tobacco: Never Used   Substance Use Topics    Alcohol use: Yes     Comment: socially    Drug use: No       FH: Reviewed with patient  Family History   Problem Relation Age of Onset    Hypertension Mother     Stroke Mother     Cancer Father         leukemia       Current medical providers:    Patient Care Team:  Glenda Henderson MD as PCP - General (Internal Medicine)  Glenda Henderson MD [facility] PCP [facility]  Harvey Egan MD (Cardiology)    Plan:      Diagnoses and all orders for this visit:    Medicare annual wellness visit, subsequent  Immunization and health screening discussed with her. ACP (advance care planning)  Asked for the Advanced Directive copy for our record.     Colon cancer screening  -     OCCULT BLOOD [facility]; Future      Health Maintenance   Topic Date Due    GLAUCOMA SCREENING Q2Y  01/04/2017    Lipid Screen  11/12/2020    Breast Cancer Screen Mammogram  08/20/2021    Medicare Yearly Exam  11/13/2021    DTaP/Tdap/Td series (2 - Td) 07/24/2023    Colorectal Cancer Screening Combo  05/02/2027    Hepatitis C Screening  Completed    Bone Densitometry (Dexa) Screening  Completed    Shingrix Vaccine Age 50>  Completed    Flu Vaccine  Completed    Pneumococcal 65+ years  Completed          Urinary/ Fecal Incontinence:  no    Regular physical exercise: yes walk and exercise classes on line    Patient verbalized understanding of information presented. AVS and Medicare Part B Preventive Services Table printed and given to pt and reviewed. See table for findings under Recommendation and Scheduled. All of the patient's questions were answered. keke by nishi Patino, as dictated by Dr. Bryan De Souza MD.    HPI     Pt presents today for a follow up on chronic conditions. She is compliant with current medication. She is scheduled to have an eye exam preformed with [facility] in 12/2020. DEXA scan on 1/2/2019 showed osteoporosis. She takes Fosamax 70 mg q7d. She requests a repeat DEXA order. .      Her mood has been fluctuating due to weather changes. 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 Sleep has improved with Trazodone 50 mg nightly. She denies symptoms of depression. She complains of R knee pain. She takes naproxen and ibuprofen prn with some relief. The pain does not hinder her from performing daily activities.     Patient Active Problem List   Diagnosis Code    Arthritis M19.90    Hyperlipidemia E78.5    Primary insomnia F51.01    Essential hypertension I10        Current Outpatient Medications on File Prior to Visit   Medication Sig Dispense Refill    traZODone (DESYREL) 150 mg tablet TAKE 1 TABLET NIGHTLY (NEED APPOINTMENT FOR ADDITIONAL REFILLS FOR CHRONIC MEDICAL CARE MANAGEMENT) 90 Tab 0    alendronate (FOSAMAX) 70 mg tablet TAKE 1 TABLET EVERY 7 DAYS 12 Tab 0    atorvastatin (LIPITOR) 10 mg tablet TAKE 1 TABLET DAILY 90 Tab 4    metoprolol tartrate (LOPRESSOR) 25 mg tablet TAKE ONE-HALF (1/2) TABLET TWICE A DAY 90 Tab 4    aspirin delayed-release 81 mg tablet Take 81 mg by mouth daily.  calcium 500 mg Tab Take 1 Tab by mouth daily.  Cholecalciferol, Vitamin D3, (VITAMIN D) 1,000 unit Cap Take 1 Tab by mouth daily.  fish oil-dha-epa (FISH OIL) 1,200-144-216 mg Cap Take 2 Caps by mouth daily. No current facility-administered medications on file prior to visit.         No Known Allergies    Past Medical History:   Diagnosis Date    Arthritis     Hypercholesterolemia     Sun-damaged skin     Sunburn, blistering        Past Surgical History:   Procedure Laterality Date    ENDOSCOPY, COLON, DIAGNOSTIC  3/7/2012/    repeat in 5 years due family hx    HX GYN          HX ORTHOPAEDIC      rotator cuff tear       Family History   Problem Relation Age of Onset    Hypertension Mother     Stroke Mother     Cancer Father         leukemia       Social History     Socioeconomic History    Marital status:      Spouse name: Not on file    Number of children: Not on file    Years of education: Not on file    Highest education level: Not on file   Occupational History    Not on file   Social Needs    Financial resource strain: Not on file    Food insecurity     Worry: Not on file     Inability: Not on file    Transportation needs     Medical: Not on file     Non-medical: Not on file   Tobacco Use    Smoking status: Never Smoker    Smokeless tobacco: Never Used   Substance and Sexual Activity    Alcohol use: Yes     Comment: socially    Drug use: No    Sexual activity: Yes     Partners: Male   Lifestyle    Physical activity     Days per week: Not on file     Minutes per session: Not on file    Stress: Not on file   Relationships    Social connections     Talks on phone: Not on file     Gets together: Not on file     Attends Mandaeism service: Not on file     Active member of club or organization: Not on file     Attends meetings of clubs or organizations: Not on file     Relationship status: Not on file    Intimate partner violence     Fear of current or ex partner: Not on file     Emotionally abused: Not on file     Physically abused: Not on file     Forced sexual activity: Not on file   Other Topics Concern    Not on file   Social History Narrative    Not on file       No visits with results within 3 Month(s) from this visit. Latest known visit with results is:   Office Visit on 11/11/2019   Component Date Value Ref Range Status    Cholesterol, total 11/12/2019 145  100 - 199 mg/dL Final    Triglyceride 11/12/2019 97  0 - 149 mg/dL Final    HDL Cholesterol 11/12/2019 51  >39 mg/dL Final    VLDL, calculated 11/12/2019 19  5 - 40 mg/dL Final    LDL, calculated 11/12/2019 75  0 - 99 mg/dL Final    WBC 11/12/2019 4.2  3.4 - 10.8 x10E3/uL Final    RBC 11/12/2019 4.53  3.77 - 5.28 x10E6/uL Final    HGB 11/12/2019 14.3  11.1 - 15.9 g/dL Final    HCT 11/12/2019 42.0  34.0 - 46.6 % Final    MCV 11/12/2019 93  79 - 97 fL Final    MCH 11/12/2019 31.6  26.6 - 33.0 pg Final    MCHC 11/12/2019 34.0  31.5 - 35.7 g/dL Final    RDW 11/12/2019 12.4  12.3 - 15.4 % Final    PLATELET 67/43/5408 664  150 - 450 x10E3/uL Final    NEUTROPHILS 11/12/2019 65  Not Estab. % Final    Lymphocytes 11/12/2019 21  Not Estab. % Final    MONOCYTES 11/12/2019 10  Not Estab. % Final    EOSINOPHILS 11/12/2019 3  Not Estab. % Final    BASOPHILS 11/12/2019 1  Not Estab. % Final    ABS. NEUTROPHILS 11/12/2019 2.7  1.4 - 7.0 x10E3/uL Final    Abs Lymphocytes 11/12/2019 0.9  0.7 - 3.1 x10E3/uL Final    ABS. MONOCYTES 11/12/2019 0.4  0.1 - 0.9 x10E3/uL Final    ABS. EOSINOPHILS 11/12/2019 0.1  0.0 - 0.4 x10E3/uL Final    ABS. BASOPHILS 11/12/2019 0.0  0.0 - 0.2 x10E3/uL Final    IMMATURE GRANULOCYTES 11/12/2019 0  Not Estab. % Final    ABS. IMM. GRANS. 11/12/2019 0.0  0.0 - 0.1 x10E3/uL Final    Glucose 11/12/2019 91  65 - 99 mg/dL Final    BUN 11/12/2019 8  8 - 27 mg/dL Final    Creatinine 11/12/2019 0.83  0.57 - 1.00 mg/dL Final    GFR est non-AA 11/12/2019 73  >59 mL/min/1.73 Final    GFR est AA 11/12/2019 84  >59 mL/min/1.73 Final    BUN/Creatinine ratio 11/12/2019 10* 12 - 28 Final    Sodium 11/12/2019 141  134 - 144 mmol/L Final    Potassium 11/12/2019 3.8  3.5 - 5.2 mmol/L Final    Chloride 11/12/2019 103  96 - 106 mmol/L Final    CO2 11/12/2019 23  20 - 29 mmol/L Final    Calcium 11/12/2019 9.2  8.7 - 10.3 mg/dL Final    Protein, total 11/12/2019 6.8  6.0 - 8.5 g/dL Final    Albumin 11/12/2019 4.4  3.6 - 4.8 g/dL Final    GLOBULIN, TOTAL 11/12/2019 2.4  1.5 - 4.5 g/dL Final    A-G Ratio 11/12/2019 1.8  1.2 - 2.2 Final    Bilirubin, total 11/12/2019 1.7* 0.0 - 1.2 mg/dL Final    Alk. phosphatase 11/12/2019 68  39 - 117 IU/L Final    AST (SGOT) 11/12/2019 29  0 - 40 IU/L Final    ALT (SGPT) 11/12/2019 24  0 - 32 IU/L Final       ROS    Visit Vitals  /85 (BP 1 Location: Right arm, BP Patient Position: Sitting)   Pulse 73   Temp 98.4 °F (36.9 °C) (Oral)   Resp 16   Ht 5' 2\" (1.575 m)   Wt 139 lb 6.4 oz (63.2 kg)   SpO2 96%   BMI 25.50 kg/m²     Review of Systems   Constitutional: Negative for malaise/fatigue and weight loss. HENT: Negative for congestion and hearing loss. Eyes: Negative for blurred vision and double vision. Respiratory: Negative for cough and shortness of breath. Cardiovascular: Negative for chest pain, palpitations and leg swelling. Gastrointestinal: Negative for abdominal pain, blood in stool and melena. Genitourinary: Negative for frequency and urgency. Musculoskeletal: Positive for joint pain. Negative for myalgias.    Neurological: Negative for sensory change and focal weakness. Psychiatric/Behavioral: Negative for substance abuse. The patient is not nervous/anxious. Physical Exam  Vitals signs and nursing note reviewed. Constitutional:       Appearance: Normal appearance. She is normal weight. HENT:      Nose: No congestion. Eyes:      General:         Right eye: No discharge. Left eye: No discharge. Extraocular Movements: Extraocular movements intact. Pupils: Pupils are equal, round, and reactive to light. Neck:      Musculoskeletal: Normal range of motion and neck supple. No neck rigidity. Cardiovascular:      Rate and Rhythm: Normal rate and regular rhythm. Pulmonary:      Effort: Pulmonary effort is normal. No respiratory distress. Breath sounds: No stridor. Abdominal:      General: There is no distension. Palpations: Abdomen is soft. Musculoskeletal: Normal range of motion. General: No swelling or tenderness. Neurological:      General: No focal deficit present. Mental Status: She is oriented to person, place, and time. Cranial Nerves: No cranial nerve deficit. Psychiatric:         Mood and Affect: Mood normal.         Behavior: Behavior normal.       ASSESSMENT AND PLAN    Diagnoses and all orders for this visit:      1. Essential hypertension  -     METABOLIC PANEL, COMPREHENSIVE; Future  -     CBC W/O DIFF; Future  BP is well-controlled on current medication. No change to dosage at this time. 2. Primary insomnia   Trazodone working well. 3. Mixed hyperlipidemia   On lipitor. No side effects.  -     LIPID PANEL; Future    4. Age-related osteoporosis without current pathological fracture  -     DEXA BONE DENSITY STUDY AXIAL; Future    5. Chronic pain of right knee  -     diclofenac (VOLTAREN) 1 % gel; Apply 2 g to affected area four (4) times daily for 30 days. sent to pharmacy. Ordered fasting labs for pt to complete today in office. Waiting on results.      This plan was reviewed with the patient and patient agrees. All questions were answered. This scribe documentation was reviewed by me and accurately reflects the examination and decisions made by me.

## 2020-11-14 NOTE — PROGRESS NOTES
[place], it was so good to see you in the office. Your blood report is back and I am happy to see cholesterol numbers look good. Rest of the blood work came back fine.

## 2020-12-31 DIAGNOSIS — M81.0 AGE-RELATED OSTEOPOROSIS WITHOUT CURRENT PATHOLOGICAL FRACTURE: ICD-10-CM

## 2021-01-01 RX ORDER — ALENDRONATE SODIUM 70 MG/1
TABLET ORAL
Qty: 12 TAB | Refills: 3 | Status: SHIPPED | OUTPATIENT
Start: 2021-01-01 | End: 2021-11-11

## 2021-01-04 DIAGNOSIS — G89.29 CHRONIC PAIN OF RIGHT KNEE: ICD-10-CM

## 2021-01-04 DIAGNOSIS — M25.561 CHRONIC PAIN OF RIGHT KNEE: ICD-10-CM

## 2021-01-04 RX ORDER — DICLOFENAC SODIUM 10 MG/G
GEL TOPICAL
Qty: 100 G | Refills: 1 | Status: SHIPPED | OUTPATIENT
Start: 2021-01-04 | End: 2021-02-09 | Stop reason: SDUPTHER

## 2021-01-06 ENCOUNTER — VIRTUAL VISIT (OUTPATIENT)
Dept: PRIMARY CARE CLINIC | Age: 71
End: 2021-01-06
Payer: MEDICARE

## 2021-01-06 DIAGNOSIS — R55 VASOVAGAL SYNCOPE: Primary | ICD-10-CM

## 2021-01-06 DIAGNOSIS — R19.7 DIARRHEA, UNSPECIFIED TYPE: ICD-10-CM

## 2021-01-06 DIAGNOSIS — R42 DIZZINESS: ICD-10-CM

## 2021-01-06 DIAGNOSIS — I10 ESSENTIAL HYPERTENSION: ICD-10-CM

## 2021-01-06 PROCEDURE — 99213 OFFICE O/P EST LOW 20 MIN: CPT | Performed by: INTERNAL MEDICINE

## 2021-01-06 NOTE — PROGRESS NOTES
Written by Bony Leigh, as dictated by Dr. Caryn Espinosa MD.    Bartolome Cooley (: 1950) is a 73 y.o. female, established patient, here for evaluation of the following chief complaint(s):   No chief complaint on file. SUBJECTIVE/OBJECTIVE:  HPI  Pt presents virtually today to discuss a recent bout of diarrhea and syncopal episode on 23. She had been having diarrhea for the past one day but nothing severe. That night, when she got up to go to the bathroom she lost consciousness while walking down the [place]. Her  wanted to call an ambulance for her but she did not want to go to the hospital. [birth year] had been her birthday but she had not eaten anything excessively sweet such as cake. Since she had not been feeling well that day she only had toast and scrambled eggs for dinner. Today, her BM have returned to normal. No headaches or dizziness since. Patient Active Problem List   Diagnosis Code    Arthritis M19.90    Hyperlipidemia E78.5    Primary insomnia F51.01    Essential hypertension I10        Current Outpatient Medications on File Prior to Visit   Medication Sig Dispense Refill    diclofenac (VOLTAREN) 1 % gel APPLY 2GM TO AFFECTED AREA 4 TIMES DAILY FOR 30 DAYS 100 g 1    alendronate (FOSAMAX) 70 mg tablet TAKE 1 TABLET EVERY 7 DAYS (NEEDS AN APPOINTMENT BEFORE NEXT REFILL) 12 Tab 3    traZODone (DESYREL) 150 mg tablet TAKE 1 TABLET NIGHTLY (NEED APPOINTMENT FOR ADDITIONAL REFILLS FOR CHRONIC MEDICAL CARE MANAGEMENT) 90 Tab 0    atorvastatin (LIPITOR) 10 mg tablet TAKE 1 TABLET DAILY 90 Tab 4    metoprolol tartrate (LOPRESSOR) 25 mg tablet TAKE ONE-HALF (1/2) TABLET TWICE A DAY 90 Tab 4    aspirin delayed-release 81 mg tablet Take 81 mg by mouth daily.  calcium 500 mg Tab Take 1 Tab by mouth daily.  Cholecalciferol, Vitamin D3, (VITAMIN D) 1,000 unit Cap Take 1 Tab by mouth daily.       fish oil-dha-epa (FISH OIL) 1,200-144-216 mg Cap Take 2 Caps by mouth daily. No current facility-administered medications on file prior to visit.         No Known Allergies    Past Medical History:   Diagnosis Date    Arthritis     Hypercholesterolemia     Sun-damaged skin     Sunburn, blistering        Past Surgical History:   Procedure Laterality Date    ENDOSCOPY, COLON, DIAGNOSTIC  3/7/2012/    repeat in 5 years due family hx    HX GYN          HX ORTHOPAEDIC      rotator cuff tear       Family History   Problem Relation Age of Onset    Hypertension Mother     Stroke Mother     Cancer Father         leukemia       Social History     Socioeconomic History    Marital status:      Spouse name: Not on file    Number of children: Not on file    Years of education: Not on file    Highest education level: Not on file   Occupational History    Not on file   Social Needs    Financial resource strain: Not on file    Food insecurity     Worry: Not on file     Inability: Not on file    Transportation needs     Medical: Not on file     Non-medical: Not on file   Tobacco Use    Smoking status: Never Smoker    Smokeless tobacco: Never Used   Substance and Sexual Activity    Alcohol use: Yes     Comment: socially    Drug use: No    Sexual activity: Yes     Partners: Male   Lifestyle    Physical activity     Days per week: Not on file     Minutes per session: Not on file    Stress: Not on file   Relationships    Social connections     Talks on phone: Not on file     Gets together: Not on file     Attends Restorationism service: Not on file     Active member of club or organization: Not on file     Attends meetings of clubs or organizations: Not on file     Relationship status: Not on file    Intimate partner violence     Fear of current or ex partner: Not on file     Emotionally abused: Not on file     Physically abused: Not on file     Forced sexual activity: Not on file   Other Topics Concern    Not on file   Social History Narrative    Not on file       Orders Only on 11/12/2020   Component Date Value Ref Range Status    LIPID PROFILE 11/12/2020        Final    Cholesterol, total 11/12/2020 174  <200 MG/DL Final    Triglyceride 11/12/2020 146  <150 MG/DL Final    Comment: Based on NCEP-ATP III:  Triglycerides <150 mg/dL  is considered normal, 150-199  mg/dL  borderline high,  200-499 mg/dL high and  greater than or equal to 500  mg/dL very high.  HDL Cholesterol 11/12/2020 49  MG/DL Final    Comment: Based on NCEP ATP III, HDL Cholesterol <40 mg/dL is considered low and >60  mg/dL is elevated.       LDL, calculated 11/12/2020 95.8  0 - 100 MG/DL Final    Comment: Based on the NCEP-ATP: LDL-C concentrations are considered  optimal <100 mg/dL,  near optimal/above Normal 100-129 mg/dL Borderline High: 130-159, High: 160-189  mg/dL Very High: Greater than or equal to 190 mg/dL      VLDL, calculated 11/12/2020 29.2  MG/DL Final    CHOL/HDL Ratio 11/12/2020 3.6  0.0 - 5.0   Final    WBC 11/12/2020 5.7  3.6 - 11.0 K/uL Final    RBC 11/12/2020 4.52  3.80 - 5.20 M/uL Final    HGB 11/12/2020 14.4  11.5 - 16.0 g/dL Final    HCT 11/12/2020 42.6  35.0 - 47.0 % Final    MCV 11/12/2020 94.2  80.0 - 99.0 FL Final    MCH 11/12/2020 31.9  26.0 - 34.0 PG Final    MCHC 11/12/2020 33.8  30.0 - 36.5 g/dL Final    RDW 11/12/2020 12.0  11.5 - 14.5 % Final    PLATELET 36/12/0731 614  150 - 400 K/uL Final    MPV 11/12/2020 10.8  8.9 - 12.9 FL Final    NRBC 11/12/2020 0.0  0  WBC Final    ABSOLUTE NRBC 11/12/2020 0.00  0.00 - 0.01 K/uL Final    Sodium 11/12/2020 140  136 - 145 mmol/L Final    Potassium 11/12/2020 4.1  3.5 - 5.1 mmol/L Final    Chloride 11/12/2020 104  97 - 108 mmol/L Final    CO2 11/12/2020 30  21 - 32 mmol/L Final    Anion gap 11/12/2020 6  5 - 15 mmol/L Final    Glucose 11/12/2020 86  65 - 100 mg/dL Final    BUN 11/12/2020 9  6 - 20 MG/DL Final    Creatinine 11/12/2020 0.74  0.55 - 1.02 MG/DL Final    BUN/Creatinine ratio 11/12/2020 12  12 - 20   Final    GFR est AA 11/12/2020 >60  >60 ml/min/1.73m2 Final    GFR est non-AA 11/12/2020 >60  >60 ml/min/1.73m2 Final    Comment: Estimated GFR is calculated using the IDMS-traceable Modification of Diet in  Renal Disease (MDRD) Study equation, reported for both  Americans  (GFRAA) and non- Americans (GFRNA), and normalized to 1.73m2 body  surface area. The physician must decide which value applies to the patient.  Calcium 11/12/2020 9.4  8.5 - 10.1 MG/DL Final    Bilirubin, total 11/12/2020 1.9* 0.2 - 1.0 MG/DL Final    ALT (SGPT) 11/12/2020 39  12 - 78 U/L Final    AST (SGOT) 11/12/2020 35  15 - 37 U/L Final    Alk. phosphatase 11/12/2020 84  45 - 117 U/L Final    Protein, total 11/12/2020 7.6  6.4 - 8.2 g/dL Final    Albumin 11/12/2020 4.4  3.5 - 5.0 g/dL Final    Globulin 11/12/2020 3.2  2.0 - 4.0 g/dL Final    A-G Ratio 11/12/2020 1.4  1.1 - 2.2   Final     Review of Systems   Constitutional: Negative for activity change, fatigue and unexpected weight change. HENT: Negative for congestion, hearing loss, rhinorrhea and sore throat. Eyes: Negative for discharge. Respiratory: Negative for cough, chest tightness and shortness of breath. Cardiovascular: Negative for leg swelling. Gastrointestinal: Positive for diarrhea. Negative for abdominal pain and constipation. Genitourinary: Negative for dysuria, flank pain, frequency and urgency. Musculoskeletal: Negative for arthralgias, back pain and myalgias. Skin: Negative for color change and rash. Neurological: Positive for syncope. Negative for dizziness, light-headedness and headaches. Psychiatric/Behavioral: Negative for dysphoric mood and sleep disturbance. The patient is not nervous/anxious.          Patient-Reported Vitals 1/6/2021   Patient-Reported Weight 139   Patient-Reported Height 5'2\"1   Patient-Reported Pulse 63   Patient-Reported Temperature 97.2   Patient-Reported SpO2 - Patient-Reported Systolic  624   Patient-Reported Diastolic 83       Physical Exam  Vitals signs reviewed. Constitutional:       General: She is not in acute distress. Appearance: Normal appearance. She is not diaphoretic. HENT:      Head: Normocephalic and atraumatic. Eyes:      General:         Right eye: No discharge. Left eye: No discharge. Extraocular Movements: Extraocular movements intact. Conjunctiva/sclera: Conjunctivae normal.   Pulmonary:      Effort: Pulmonary effort is normal.   Neurological:      Mental Status: She is alert and oriented to person, place, and time. Psychiatric:         Mood and Affect: Mood and affect normal.       ASSESSMENT/PLAN:  1. Vasovagal syncope  Explained the syncope could have been vasovagal or related to dehydration combined with taking metoprolol. 2. Diarrhea, unspecified type   Instructed her to drink plenty of water and gatorade to rehydrate. If she has note diarrhea or vomiting she should check her BP before taking BP medication. 3. Dizziness  I instructed her to stay hydrated and to drink gatorade to replenish her electrolytes. Instructed her to look for tingling and numbness, vision problems, weakness or HA as these are TIA sx. If she has those sx she should go to the ED. If she does not have these sx but still does not feel like she is back to normal she should come into the office so I can examine her and do labs. 4. Essential hypertension  Explained the syncope could have been vasovagal or related to dehydration combined with taking metoprolol. If she has note diarrhea or vomiting she should check her BP before taking BP medication. Andrea Guzmán is being evaluated by a Virtual Visit (video visit) encounter to address concerns as mentioned above.  . Due to this being a TeleHealth encounter (During [facility]- public health emergency), evaluation of the following organ systems was limited: Vitals/Constitutional/EENT/Resp/CV/GI//MS/Neuro/Skin/Heme-Lymph-Imm. Pursuant to the emergency declaration under the [address] [place] [address], [address] [place] [address] and the [employer] Act, this Virtual Visit was conducted with patient's (and/or legal guardian's) consent, to reduce the patient's risk of exposure to COVID-19 and provide necessary medical care. The patient (and/or legal guardian) has also been advised to contact this office for worsening conditions or problems, and seek emergency medical treatment and/or call 911 if deemed necessary. Patient identification was verified at the start of the visit: YES    Services were provided through a video synchronous discussion virtually to substitute for in-person clinic visit. Patient and provider were located at their individual sites. An electronic signature was used to authenticate this note.   -- Estrella Chaney

## 2021-01-07 ENCOUNTER — HOSPITAL ENCOUNTER (OUTPATIENT)
Dept: BONE DENSITY | Age: 71
Discharge: HOME | End: 2021-01-07
Attending: INTERNAL MEDICINE
Payer: MEDICARE

## 2021-01-07 DIAGNOSIS — M81.0 AGE-RELATED OSTEOPOROSIS WITHOUT CURRENT PATHOLOGICAL FRACTURE: ICD-10-CM

## 2021-01-07 PROCEDURE — 77080 DXA BONE DENSITY AXIAL: CPT

## 2021-01-10 NOTE — PROGRESS NOTES
Luis Orellana, hope you are doing well. Your Bone density scan showed osteopenia is improving. It means Fosamax is working. I would suggest continuing Fosamax. Do you need refill?

## 2021-01-15 RX ORDER — METOPROLOL TARTRATE 25 MG/1
TABLET, FILM COATED ORAL
Qty: 90 TAB | Refills: 3 | Status: SHIPPED | OUTPATIENT
Start: 2021-01-15 | End: 2021-06-07 | Stop reason: SDUPTHER

## 2021-01-15 RX ORDER — ATORVASTATIN CALCIUM 10 MG/1
TABLET, FILM COATED ORAL
Qty: 90 TAB | Refills: 3 | Status: SHIPPED | OUTPATIENT
Start: 2021-01-15 | End: 2021-12-09

## 2021-01-15 NOTE — TELEPHONE ENCOUNTER
Received refill request for metoprolol 25 mg tabs. Last appt 06/2020, was told to follow up in 1 year. No pending appt. Please schedule appt for sometime in the next 6-9 months with Dr. Raymundo Bass. Refill authorized.

## 2021-02-09 DIAGNOSIS — M25.561 CHRONIC PAIN OF RIGHT KNEE: ICD-10-CM

## 2021-02-09 DIAGNOSIS — G89.29 CHRONIC PAIN OF RIGHT KNEE: ICD-10-CM

## 2021-02-09 RX ORDER — DICLOFENAC SODIUM 10 MG/G
GEL TOPICAL
Qty: 100 G | Refills: 1 | Status: SHIPPED | OUTPATIENT
Start: 2021-02-09 | End: 2021-02-09 | Stop reason: SDUPTHER

## 2021-02-09 NOTE — TELEPHONE ENCOUNTER
Hi Dr. Leidy Pope,  I have had a flare-up of hip pain (on my left side this time), and have found that the voltaren gel you prescribed helps. Can you please send a prescription to [facility] for me? It's the diclofenac sodium 1% gel.       Thank you.     Hope you and your family are well.     Last office visit 1/6/2021  Last med refill 1/4/2021

## 2021-02-09 NOTE — TELEPHONE ENCOUNTER
Patient is requesting that her prescription be sent to [facility].  ( Voltaren 1% gel)    Last refill - today

## 2021-02-10 RX ORDER — DICLOFENAC SODIUM 10 MG/G
GEL TOPICAL
Qty: 100 G | Refills: 1 | Status: SHIPPED | OUTPATIENT
Start: 2021-02-10

## 2021-03-01 ENCOUNTER — TELEPHONE (OUTPATIENT)
Dept: CARDIOLOGY CLINIC | Age: 71
End: 2021-03-01

## 2021-03-01 NOTE — TELEPHONE ENCOUNTER
Patient last seen via virtual visit 06/15/2020, followed by Dr. Sharmila Grubbs for PACs, PVCs, PSVT. Last echo in 11/2018 showed normal LVEF with no regional wall motion abnormalities, grade 1 diastolic dysfunction. Stress echo in 04/2013 was normal.    As chest discomfort has no associated symptoms & resolves once she's up & moving around, suspect either reflux or musculoskeletal cause. Chest CT with coronary calcium scoring in 10/2018 showed RCA score 30, other vessels 0; unlikely that she has obstructive CAD at this point. Recommend trial of Protonix 20 mg po daily or famotidine 20 mg po q hs. If pain continues despite medication, would then consider nuclear stress test to rule out ischemia and/or appointment to discuss further with Dr. Sharmila Grubbs sometime sooner than 06/2021. Regarding sensation of ears being blocked & mild dizziness when going from sitting to standing, recommend ENT eval for possible inner ear issue.     Future Appointments   Date Time Provider Yoli Dozier   6/15/2021 11:40 AM MD JEN Lange AMB

## 2021-03-01 NOTE — TELEPHONE ENCOUNTER
Patient stated that, for the past few days, she has been experiencing pain in her chest when she wakes up. She stated that it does not last long and she is not currently having pains but she is concerned. Please advise.     Phone #: [phone]  Thanks

## 2021-03-01 NOTE — TELEPHONE ENCOUNTER
Called pt two patient identifiers confirmed. Notified pt about NP recommendations. Pt stated she would not like to try any new medications at this time. Pt stated she will call back if symptoms persist. Pt verbalized understanding of information discussed w/ no further questions at this time.

## 2021-03-01 NOTE — TELEPHONE ENCOUNTER
Called pt two patient identifiers confirmed. Pt stated that she is having some chest pain for the last few mornings when she wakes up. Pt stated it dissipates as she gets up and starts to move around. Pt denied the pain radiating up her neck. down her arm, and in between her shoulders. Pt denied any SOB, lightheadedness, or dizziness at the time of CP as well. Pt stated she has not been checking her BP when this happens but will start now. Pt stated that she also is having issue from sitting to standing her ears get blocked and gets slightly dizzy.  Will Notify MD/NP

## 2021-03-04 ENCOUNTER — OFFICE VISIT (OUTPATIENT)
Dept: CARDIOLOGY CLINIC | Age: 71
End: 2021-03-04
Payer: MEDICARE

## 2021-03-04 VITALS
DIASTOLIC BLOOD PRESSURE: 90 MMHG | HEIGHT: 62 IN | SYSTOLIC BLOOD PRESSURE: 136 MMHG | BODY MASS INDEX: 25.76 KG/M2 | RESPIRATION RATE: 16 BRPM | HEART RATE: 78 BPM | WEIGHT: 140 LBS

## 2021-03-04 DIAGNOSIS — I49.3 PVC (PREMATURE VENTRICULAR CONTRACTION): ICD-10-CM

## 2021-03-04 DIAGNOSIS — R00.2 INTERMITTENT PALPITATIONS: ICD-10-CM

## 2021-03-04 DIAGNOSIS — I49.1 PAC (PREMATURE ATRIAL CONTRACTION): ICD-10-CM

## 2021-03-04 DIAGNOSIS — I47.1 PSVT (PAROXYSMAL SUPRAVENTRICULAR TACHYCARDIA) (HCC): ICD-10-CM

## 2021-03-04 DIAGNOSIS — R07.9 CHEST PAIN AT REST: Primary | ICD-10-CM

## 2021-03-04 PROCEDURE — G8427 DOCREV CUR MEDS BY ELIG CLIN: HCPCS | Performed by: INTERNAL MEDICINE

## 2021-03-04 PROCEDURE — G8419 CALC BMI OUT NRM PARAM NOF/U: HCPCS | Performed by: INTERNAL MEDICINE

## 2021-03-04 PROCEDURE — G8399 PT W/DXA RESULTS DOCUMENT: HCPCS | Performed by: INTERNAL MEDICINE

## 2021-03-04 PROCEDURE — G8536 NO DOC ELDER MAL SCRN: HCPCS | Performed by: INTERNAL MEDICINE

## 2021-03-04 PROCEDURE — 99214 OFFICE O/P EST MOD 30 MIN: CPT | Performed by: INTERNAL MEDICINE

## 2021-03-04 PROCEDURE — 93005 ELECTROCARDIOGRAM TRACING: CPT | Performed by: INTERNAL MEDICINE

## 2021-03-04 PROCEDURE — 93010 ELECTROCARDIOGRAM REPORT: CPT | Performed by: INTERNAL MEDICINE

## 2021-03-04 PROCEDURE — 1101F PT FALLS ASSESS-DOCD LE1/YR: CPT | Performed by: INTERNAL MEDICINE

## 2021-03-04 PROCEDURE — G8752 SYS BP LESS 140: HCPCS | Performed by: INTERNAL MEDICINE

## 2021-03-04 PROCEDURE — G9711 PT HX TOT COL OR COLON CA: HCPCS | Performed by: INTERNAL MEDICINE

## 2021-03-04 PROCEDURE — 1090F PRES/ABSN URINE INCON ASSESS: CPT | Performed by: INTERNAL MEDICINE

## 2021-03-04 PROCEDURE — G8755 DIAS BP > OR = 90: HCPCS | Performed by: INTERNAL MEDICINE

## 2021-03-04 PROCEDURE — G0463 HOSPITAL OUTPT CLINIC VISIT: HCPCS | Performed by: INTERNAL MEDICINE

## 2021-03-04 PROCEDURE — G8432 DEP SCR NOT DOC, RNG: HCPCS | Performed by: INTERNAL MEDICINE

## 2021-03-04 NOTE — PATIENT INSTRUCTIONS
You will be scheduled for nuclear stress testing after your appointment today. Wear comfortable clothing (shorts or pants with a shirt or blouse- no underwire bras) and walking or athletic shoes. Do not eat or drink anything, except water, for at least 2 hours prior to your appointment. Avoid tobacco products for at least 6 hours prior to your test. 
 
Do not eat or drink anything containing caffeine, including but not limited to the following: chocolate, regular and decaffeinated coffee, soft drinks, or tea for at least 12-24 hours prior to your test. 
 
Do not hold your scheduled medications prior to your test. If you are a diabetic, please ask your physician how to adjust your food and insulin prior to your test. Please bring all medications you are currently taking. You will need to inform our office if you are pregnant, nursing, or think you may be pregnant. Your test will be performed on a 1 day protocol. This is determined by your height, weight, and other risk factors. For a 2 day test, please allow for 2 hours in the office each day. For a 1 day test, please allow for 4 hours in the office that day. The radioactive isotope used for your testing is different from any of the dyes that are commonly used in x-ray procedures, and is ordered specially for your test. Please call to cancel or reschedule your appointment at least 24 hours prior to your scheduled appointment to avoid being billed for the expensive isotope.

## 2021-03-04 NOTE — PROGRESS NOTES
Cardiac Electrophysiology OFFICE Note     Subjective:      Michelle Shah is a 73 y.o. patient who presents for follow up of chest pain   She had in the past palpitations, PSVT, PACs, PVCs. She called on 03/01/2021 to report chest pain x a few mornings; she felt her heart beat jump squeezing   ECG today shows NSR with artifacts    Chest CT with coronary calcium scoring in 10/2018 showed RCA score 30, other vessels 0. Last echo in 11/2018 showed normal LVEF with no regional wall motion abnormalities, grade 1 diastolic dysfunction. Stress echo in 04/2013 was normal.       Previous:  Intolerant of diltiazem, switched to metoprolol. Noted dizziness & fatigue with 25 mg po bid dosing, decreased to 12.5 mg po bid & feels she's doing well on this dose. Palpitations x several years. Referred from Dr. Escamilla Degree office. Previous loop monitor significant for sinus tachycardia both with & without LBBB. 1700 PVCs on holter in 2009. Stress echo normal then. Patient Active Problem List   Diagnosis Code    Arthritis M19.90    Hyperlipidemia E78.5    Primary insomnia F51.01    Essential hypertension I10     Current Outpatient Medications   Medication Sig Dispense Refill    diclofenac (VOLTAREN) 1 % gel APPLY 2GM TO AFFECTED AREA 4 TIMES DAILY FOR 30 DAYS 100 g 1    atorvastatin (LIPITOR) 10 mg tablet TAKE 1 TABLET DAILY 90 Tab 3    metoprolol tartrate (LOPRESSOR) 25 mg tablet TAKE ONE-HALF (1/2) TABLET TWICE A DAY 90 Tab 3    alendronate (FOSAMAX) 70 mg tablet TAKE 1 TABLET EVERY 7 DAYS (NEEDS AN APPOINTMENT BEFORE NEXT REFILL) 12 Tab 3    traZODone (DESYREL) 150 mg tablet TAKE 1 TABLET NIGHTLY (NEED APPOINTMENT FOR ADDITIONAL REFILLS FOR CHRONIC MEDICAL CARE MANAGEMENT) 90 Tab 0    aspirin delayed-release 81 mg tablet Take 81 mg by mouth daily.  calcium 500 mg Tab Take 1 Tab by mouth daily.  Cholecalciferol, Vitamin D3, (VITAMIN D) 1,000 unit Cap Take 1 Tab by mouth daily.       fish oil-dha-epa (FISH OIL) 1,200-144-216 mg Cap Take 2 Caps by mouth daily. No Known Allergies  Past Medical History:   Diagnosis Date    Arthritis     Hypercholesterolemia     Sun-damaged skin     Sunburn, blistering      Past Surgical History:   Procedure Laterality Date    ENDOSCOPY, COLON, DIAGNOSTIC  3/7/2012/    repeat in 5 years due family hx    HX GYN          HX ORTHOPAEDIC      rotator cuff tear     Family History   Problem Relation Age of Onset    Hypertension Mother     Stroke Mother     Cancer Father         leukemia     Social History     Tobacco Use    Smoking status: Never Smoker    Smokeless tobacco: Never Used   Substance Use Topics    Alcohol use: Yes     Comment: socially        Review of Systems:   Constitutional: Negative for fever, chills, weight loss, malaise/fatigue. HEENT: Negative for nosebleeds, vision changes. Respiratory: Negative for cough, hemoptysis  Cardiovascular: Negative for  no orthopnea, claudication, leg swelling, syncope, and PND. Gastrointestinal: Negative for nausea, vomiting, diarrhea, blood in stool and melena. Genitourinary: Negative for dysuria, and hematuria. Musculoskeletal: Negative for myalgias, arthralgia. Skin: Negative for rash. Heme: Does not bleed or bruise easily. Neurological: Negative for speech change and focal weakness. Objective:     Visit Vitals  BP (!) 136/90 (BP 1 Location: Left upper arm, BP Patient Position: Sitting)   Pulse 78   Resp 16   Ht 5' 2\" (1.575 m)   Wt 140 lb (63.5 kg)   BMI 25.61 kg/m²     Physical Exam:   Constitutional: Well-developed and well-nourished. No respiratory distress. Head: Normocephalic and atraumatic. Eyes: Pupils are equal, round. ENT: Hearing grossly normal.  Neck: Supple. No JVD present. Cardiovascular: Normal rate, regular rhythm. Exam reveals no gallop and no friction rub. No murmur heard. Pulmonary/Chest: Effort normal and breath sounds normal. No wheezes. Abdominal: Soft, no tenderness. Musculoskeletal: Moves extremities independently. Normal gait. Vasc/lymphatic: No edema. Neurological: Alert,oriented. Skin: Skin is warm and dry. Psychiatric: normal mood and affect. Behavior is normal. Judgment and thought content normal.        Assessment/Plan:       ICD-10-CM ICD-9-CM    1. Chest pain at rest  R07.9 786.50    2. Intermittent palpitations  R00.2 785.1 AMB POC EKG ROUTINE W/ 12 LEADS, INTER & REP   3. PSVT (paroxysmal supraventricular tachycardia) (HCC)  I47.1 427.0    4. PAC (premature atrial contraction)  I49.1 427.61    5. PVC (premature ventricular contraction)  I49.3 427.69          Imaging/Studies:  Echo (11/06/2018): LVEF 59%, no RWMA, grade 1 diastolic dysfunction. Prominent Chiari network in RA. Trivial TR. Trivial MS. Loop monitor (10/2018): Few PACs, 50 PVCs, & single 7 beat run PSVT. Ms. Karyn Nixon has chest pain that is concerning to her and her   She has daily and not sure if this is ischemia  I recommend and discussed with her about lexiscan cardiolite stress test  She agrees     BP is slightly elevated. Diastolic BP is 90 mmHg    Will continue to monitor arrhythmia     Addendum  nuc stress test 3/2021  Defects appear to be breast attenuation but cannot do prone images  If pain persists would proceed with cardiac cath then      Future Appointments   Date Time Provider Yoli Dozier   3/4/2021  1:20 PM MD JEN Mcbride BS AMB   6/15/2021 11:40 AM MD JEN Mcbride BS AMB      Thank you for involving me in this patient's care and please call with further concerns or questions. Tee Baez M.D.   Electrophysiology/Cardiology  [facility]  [address] [place] [address], [address] [place] [address]                               [phone]

## 2021-03-12 ENCOUNTER — TELEPHONE (OUTPATIENT)
Dept: CARDIOLOGY CLINIC | Age: 71
End: 2021-03-12

## 2021-03-12 NOTE — TELEPHONE ENCOUNTER
Patient would like to speak with a nurse as she had a rough night as she was having strong palpations and was not able to sleep well.       Phone: [phone]

## 2021-03-12 NOTE — TELEPHONE ENCOUNTER
Called pt two patient identifiers confirmed. Pt stated that she felt lots of palpitations last night and would like to know if test can be moved up sooner. Notified pt at this point I am not able to bring her any sooner. Notifeid pt I will keep an eye out for cancellations and call her to reschedule. Pt stated she is now taking Lopressor 25 mg bid instead of 1/2 tab bid. Pt verbalized understanding of information discussed w/ no further questions at this time.

## 2021-03-25 ENCOUNTER — ANCILLARY PROCEDURE (OUTPATIENT)
Dept: CARDIOLOGY CLINIC | Age: 71
End: 2021-03-25
Payer: MEDICARE

## 2021-03-25 VITALS
BODY MASS INDEX: 25.76 KG/M2 | WEIGHT: 140 LBS | SYSTOLIC BLOOD PRESSURE: 116 MMHG | DIASTOLIC BLOOD PRESSURE: 66 MMHG | HEIGHT: 62 IN

## 2021-03-25 DIAGNOSIS — E78.2 MIXED HYPERLIPIDEMIA: ICD-10-CM

## 2021-03-25 DIAGNOSIS — R07.9 CHEST PAIN AT REST: ICD-10-CM

## 2021-03-25 DIAGNOSIS — I10 ESSENTIAL HYPERTENSION: ICD-10-CM

## 2021-03-25 DIAGNOSIS — M19.90 ARTHRITIS: ICD-10-CM

## 2021-03-25 LAB
STRESS BASELINE DIAS BP: 66 MMHG
STRESS BASELINE HR: 54 BPM
STRESS BASELINE SYS BP: 116 MMHG
STRESS O2 SAT PEAK: 98 %
STRESS O2 SAT REST: 98 %
STRESS PEAK DIAS BP: 70 MMHG
STRESS PEAK SYS BP: 110 MMHG
STRESS PERCENT HR ACHIEVED: 68 %
STRESS POST PEAK HR: 102 BPM
STRESS RATE PRESSURE PRODUCT: NORMAL BPM*MMHG
STRESS ST DEPRESSION: 0 MM
STRESS ST ELEVATION: 0 MM
STRESS TARGET HR: 151 BPM

## 2021-03-25 PROCEDURE — 78452 HT MUSCLE IMAGE SPECT MULT: CPT | Performed by: INTERNAL MEDICINE

## 2021-03-25 PROCEDURE — A9500 TC99M SESTAMIBI: HCPCS | Performed by: INTERNAL MEDICINE

## 2021-03-25 PROCEDURE — 93016 CV STRESS TEST SUPVJ ONLY: CPT | Performed by: INTERNAL MEDICINE

## 2021-03-25 PROCEDURE — 93018 CV STRESS TEST I&R ONLY: CPT | Performed by: INTERNAL MEDICINE

## 2021-03-25 RX ORDER — TETRAKIS(2-METHOXYISOBUTYLISOCYANIDE)COPPER(I) TETRAFLUOROBORATE 1 MG/ML
30 INJECTION, POWDER, LYOPHILIZED, FOR SOLUTION INTRAVENOUS ONCE
Status: COMPLETED | OUTPATIENT
Start: 2021-03-25 | End: 2021-03-25

## 2021-03-25 RX ORDER — TETRAKIS(2-METHOXYISOBUTYLISOCYANIDE)COPPER(I) TETRAFLUOROBORATE 1 MG/ML
10 INJECTION, POWDER, LYOPHILIZED, FOR SOLUTION INTRAVENOUS ONCE
Status: COMPLETED | OUTPATIENT
Start: 2021-03-25 | End: 2021-03-25

## 2021-03-25 RX ADMIN — REGADENOSON 0.4 MG: 0.08 INJECTION, SOLUTION INTRAVENOUS at 09:40

## 2021-03-25 RX ADMIN — TETRAKIS(2-METHOXYISOBUTYLISOCYANIDE)COPPER(I) TETRAFLUOROBORATE 8.4 MILLICURIE: 1 INJECTION, POWDER, LYOPHILIZED, FOR SOLUTION INTRAVENOUS at 08:10

## 2021-03-25 RX ADMIN — TECHNETIUM TC 99M SESTAMIBI 26.3 MILLICURIE: 1 INJECTION INTRAVENOUS at 09:40

## 2021-03-26 ENCOUNTER — TELEPHONE (OUTPATIENT)
Dept: CARDIOLOGY CLINIC | Age: 71
End: 2021-03-26

## 2021-03-26 NOTE — TELEPHONE ENCOUNTER
Pt called 2 pt identifiers confirmed. Notified pt about Stress test results and Dr. Jan Anders recommendations. Pt stated she would like to hold off on cardiac cath until after she comes back from visit with Daughter. Pt will call back when she comes back and schedule then. Pt verbalized understanding of information discussed w/ no further questions at this time.

## 2021-03-26 NOTE — TELEPHONE ENCOUNTER
----- Message from Emilio Lucas MD sent at 3/26/2021  8:14 AM EDT -----  Defects appear to be breast attenuation but cannot do prone images  If pain persists would proceed with cardiac cath then

## 2021-06-07 RX ORDER — METOPROLOL TARTRATE 25 MG/1
25 TABLET, FILM COATED ORAL 2 TIMES DAILY
Qty: 180 TABLET | Refills: 1 | Status: SHIPPED | OUTPATIENT
Start: 2021-06-07 | End: 2021-11-11

## 2021-07-02 NOTE — TELEPHONE ENCOUNTER
Contact pt. For a follow up phone call in regards to the medication as to when this may be ready @ [phone]. She states that she sent a My Chart message with no response on 11/14/18. Pt. Last office visit was 11/8/18, so she would like both med's refilled at this time.
none

## 2021-07-05 DIAGNOSIS — Z72.820 SLEEP DEFICIENT: ICD-10-CM

## 2021-07-05 RX ORDER — TRAZODONE HYDROCHLORIDE 150 MG/1
TABLET ORAL
Qty: 90 TABLET | Refills: 3 | Status: SHIPPED | OUTPATIENT
Start: 2021-07-05 | End: 2022-05-26

## 2021-09-15 DIAGNOSIS — B02.9 HERPES ZOSTER WITHOUT COMPLICATION: Primary | ICD-10-CM

## 2021-09-15 RX ORDER — VALACYCLOVIR HYDROCHLORIDE 500 MG/1
500 TABLET, FILM COATED ORAL 2 TIMES DAILY
Qty: 20 TABLET | Refills: 0 | Status: SHIPPED | OUTPATIENT
Start: 2021-09-15 | End: 2021-09-25

## 2021-09-15 RX ORDER — METHYLPREDNISOLONE 4 MG/1
TABLET ORAL
Qty: 1 DOSE PACK | Refills: 0 | Status: SHIPPED | OUTPATIENT
Start: 2021-09-15 | End: 2021-09-28 | Stop reason: ALTCHOICE

## 2021-09-16 ENCOUNTER — TELEPHONE (OUTPATIENT)
Dept: PRIMARY CARE CLINIC | Age: 71
End: 2021-09-16

## 2021-09-28 ENCOUNTER — OFFICE VISIT (OUTPATIENT)
Dept: PRIMARY CARE CLINIC | Age: 71
End: 2021-09-28
Payer: MEDICARE

## 2021-09-28 VITALS
OXYGEN SATURATION: 97 % | RESPIRATION RATE: 18 BRPM | HEART RATE: 68 BPM | WEIGHT: 138 LBS | SYSTOLIC BLOOD PRESSURE: 132 MMHG | HEIGHT: 62 IN | TEMPERATURE: 97.6 F | DIASTOLIC BLOOD PRESSURE: 80 MMHG | BODY MASS INDEX: 25.4 KG/M2

## 2021-09-28 DIAGNOSIS — Z01.818 PRE-OP EXAM: ICD-10-CM

## 2021-09-28 DIAGNOSIS — I10 ESSENTIAL HYPERTENSION: ICD-10-CM

## 2021-09-28 DIAGNOSIS — H53.8 BLURRED VISION, BILATERAL: Primary | ICD-10-CM

## 2021-09-28 DIAGNOSIS — E78.2 MIXED HYPERLIPIDEMIA: ICD-10-CM

## 2021-09-28 DIAGNOSIS — F51.01 PRIMARY INSOMNIA: ICD-10-CM

## 2021-09-28 PROCEDURE — 99213 OFFICE O/P EST LOW 20 MIN: CPT | Performed by: INTERNAL MEDICINE

## 2021-09-28 PROCEDURE — G8427 DOCREV CUR MEDS BY ELIG CLIN: HCPCS | Performed by: INTERNAL MEDICINE

## 2021-09-28 PROCEDURE — G8754 DIAS BP LESS 90: HCPCS | Performed by: INTERNAL MEDICINE

## 2021-09-28 PROCEDURE — G8536 NO DOC ELDER MAL SCRN: HCPCS | Performed by: INTERNAL MEDICINE

## 2021-09-28 PROCEDURE — G8752 SYS BP LESS 140: HCPCS | Performed by: INTERNAL MEDICINE

## 2021-09-28 PROCEDURE — G8399 PT W/DXA RESULTS DOCUMENT: HCPCS | Performed by: INTERNAL MEDICINE

## 2021-09-28 PROCEDURE — G8432 DEP SCR NOT DOC, RNG: HCPCS | Performed by: INTERNAL MEDICINE

## 2021-09-28 PROCEDURE — G8419 CALC BMI OUT NRM PARAM NOF/U: HCPCS | Performed by: INTERNAL MEDICINE

## 2021-09-28 PROCEDURE — G9711 PT HX TOT COL OR COLON CA: HCPCS | Performed by: INTERNAL MEDICINE

## 2021-09-28 PROCEDURE — 1101F PT FALLS ASSESS-DOCD LE1/YR: CPT | Performed by: INTERNAL MEDICINE

## 2021-09-28 PROCEDURE — 1090F PRES/ABSN URINE INCON ASSESS: CPT | Performed by: INTERNAL MEDICINE

## 2021-11-11 DIAGNOSIS — M81.0 AGE-RELATED OSTEOPOROSIS WITHOUT CURRENT PATHOLOGICAL FRACTURE: ICD-10-CM

## 2021-11-11 RX ORDER — METOPROLOL TARTRATE 25 MG/1
TABLET, FILM COATED ORAL
Qty: 180 TABLET | Refills: 3 | Status: SHIPPED | OUTPATIENT
Start: 2021-11-11 | End: 2022-10-13

## 2021-11-11 RX ORDER — ALENDRONATE SODIUM 70 MG/1
TABLET ORAL
Qty: 12 TABLET | Refills: 3 | Status: SHIPPED | OUTPATIENT
Start: 2021-11-11

## 2021-11-11 NOTE — TELEPHONE ENCOUNTER
Received refill request for metoprolol 25 mg po tabs. Refill authorized.     Future Appointments   Date Time Provider Yoli Dozier   11/15/2021  9:30 AM Magda Sen MD [facility] BS AMB   3/3/2022  1:00 PM MD JEN Ybarra BS AMB

## 2021-11-15 ENCOUNTER — OFFICE VISIT (OUTPATIENT)
Dept: PRIMARY CARE CLINIC | Age: 71
End: 2021-11-15
Payer: MEDICARE

## 2021-11-15 VITALS
DIASTOLIC BLOOD PRESSURE: 82 MMHG | SYSTOLIC BLOOD PRESSURE: 142 MMHG | HEIGHT: 62 IN | HEART RATE: 56 BPM | BODY MASS INDEX: 24.92 KG/M2 | RESPIRATION RATE: 15 BRPM | OXYGEN SATURATION: 97 % | WEIGHT: 135.4 LBS | TEMPERATURE: 98.2 F

## 2021-11-15 DIAGNOSIS — I10 ESSENTIAL HYPERTENSION: ICD-10-CM

## 2021-11-15 DIAGNOSIS — M79.601 RIGHT ARM PAIN: ICD-10-CM

## 2021-11-15 DIAGNOSIS — E78.2 MIXED HYPERLIPIDEMIA: ICD-10-CM

## 2021-11-15 DIAGNOSIS — Z00.00 MEDICARE ANNUAL WELLNESS VISIT, SUBSEQUENT: Primary | ICD-10-CM

## 2021-11-15 DIAGNOSIS — Z71.89 ACP (ADVANCE CARE PLANNING): ICD-10-CM

## 2021-11-15 DIAGNOSIS — F51.01 PRIMARY INSOMNIA: ICD-10-CM

## 2021-11-15 PROCEDURE — G8427 DOCREV CUR MEDS BY ELIG CLIN: HCPCS | Performed by: INTERNAL MEDICINE

## 2021-11-15 PROCEDURE — G9899 SCRN MAM PERF RSLTS DOC: HCPCS | Performed by: INTERNAL MEDICINE

## 2021-11-15 PROCEDURE — 1101F PT FALLS ASSESS-DOCD LE1/YR: CPT | Performed by: INTERNAL MEDICINE

## 2021-11-15 PROCEDURE — G8399 PT W/DXA RESULTS DOCUMENT: HCPCS | Performed by: INTERNAL MEDICINE

## 2021-11-15 PROCEDURE — 1090F PRES/ABSN URINE INCON ASSESS: CPT | Performed by: INTERNAL MEDICINE

## 2021-11-15 PROCEDURE — G8420 CALC BMI NORM PARAMETERS: HCPCS | Performed by: INTERNAL MEDICINE

## 2021-11-15 PROCEDURE — G8536 NO DOC ELDER MAL SCRN: HCPCS | Performed by: INTERNAL MEDICINE

## 2021-11-15 PROCEDURE — 99214 OFFICE O/P EST MOD 30 MIN: CPT | Performed by: INTERNAL MEDICINE

## 2021-11-15 PROCEDURE — G8432 DEP SCR NOT DOC, RNG: HCPCS | Performed by: INTERNAL MEDICINE

## 2021-11-15 PROCEDURE — G8754 DIAS BP LESS 90: HCPCS | Performed by: INTERNAL MEDICINE

## 2021-11-15 PROCEDURE — G0439 PPPS, SUBSEQ VISIT: HCPCS | Performed by: INTERNAL MEDICINE

## 2021-11-15 PROCEDURE — G9711 PT HX TOT COL OR COLON CA: HCPCS | Performed by: INTERNAL MEDICINE

## 2021-11-15 PROCEDURE — G8753 SYS BP > OR = 140: HCPCS | Performed by: INTERNAL MEDICINE

## 2021-11-15 NOTE — PROGRESS NOTES
Chief Complaint   Patient presents with   [place] Annual Wellness Visit     right arm has been bothering her       Visit Vitals  BP (!) 150/97 (BP 1 Location: Right arm)   Pulse (!) 56   Temp 98.2 °F (36.8 °C)   Resp 15   Ht 5' 2\" (1.575 m)   Wt 135 lb 6.4 oz (61.4 kg)   SpO2 97%   BMI 24.76 kg/m²       1. Have you been to the ER, urgent care clinic since your last visit? Hospitalized since your last visit? No    2. Have you seen or consulted any other health care providers outside of the [address] [place] [address] since your last visit? Include any pap smears or colon screening. Yes Opthamologist, Joann Cevallos, Dr Thomason Charles is a 73 y.o. female and presents for Annual Medicare Wellness Visit. Assessment of cognitive impairment: Alert and oriented x 4. Depression Screen:   3 most recent PHQ Screens 11/11/2019   Little interest or pleasure in doing things Not at all   Feeling down, depressed, irritable, or hopeless Several days   Total Score PHQ 2 1   Trouble falling or staying asleep, or sleeping too much -   Feeling tired or having little energy -   Poor appetite, weight loss, or overeating -   Feeling bad about yourself - or that you are a failure or have let yourself or your family down -   Trouble concentrating on things such as school, work, reading, or watching TV -   Moving or speaking so slowly that other people could have noticed; or the opposite being so fidgety that others notice -   Thoughts of being better off dead, or hurting yourself in some way -   PHQ 9 Score -       Fall Risk Assessment:    Fall Risk Assessment, last 12 mths 11/15/2021   Able to walk? Yes   Fall in past 12 months? 0   Do you feel unsteady? 0   Are you worried about falling 0   Number of falls in past 12 months -   Fall with injury? -       Abuse Screen:   Abuse Screening Questionnaire 11/11/2019   Do you ever feel afraid of your partner?  N   Are you in a relationship with someone who physically or mentally threatens you? N   Is it safe for you to go home? Y       Activities of Daily Living:  Self-care. Requires assistance with: no ADLs  Patient handle his/her own medications  yes Use of pill box  yes  Activities of Daily Living:   ADL Assessment 11/15/2021   Feeding yourself No Help Needed   Getting from bed to chair No Help Needed   Getting dressed No Help Needed   Bathing or showering No Help Needed   Walk across the room (includes cane/walker) No Help Needed   Using the telphone No Help Needed   Taking your medications No Help Needed   Preparing meals No Help Needed   Managing money (expenses/bills) No Help Needed   Moderately strenuous housework (laundry) No Help Needed   Shopping for personal items (toiletries/medicines) No Help Needed   Shopping for groceries No Help Needed   Driving No Help Needed   Climbing a flight of stairs No Help Needed   Getting to places beyond walking distances No Help Needed       Health Maintenance:  Daily Aspirin: no  Bone Density: up to date  Glaucoma Screening: yes   Immunizations:    Tetanus: up to date. Influenza: up to date. Shingles: up to date. PPSV-23: up to date. Prevnar-13: up to date. COVID vaccine up to date     Cancer screening:    Cervical: n/a due to age. Breast: up to date. Colon: up to date. 5/2017    Alcohol Risk Screen:   On any occasion during the past 3 months, have you had more than 3 drinks(female) or 4 drinks (male) containing alcohol in one? No  Do you average more than 7 drinks (female) or 14 drinks (male) per week? No  Type and amount:1 Glasses of wine    Hearing Loss:  Hearing is good. denies any hearing loss    Vision Loss:   Wears glasses, contact lenses, or have any other visual impairment  Currently just using readers    Adult Nutrition Screen:  No risk factors noted. Advance Care Planning:   End of Life Planning: has an advanced directive - a copy HAS NOT been provided.   Archie Ortega ACP-Facilitator appointment no      Medications/Allergies: Reviewed with patient  Prior to Admission medications    Medication Sig Start Date End Date Taking? Authorizing Provider   alendronate (FOSAMAX) 70 mg tablet TAKE 1 TABLET EVERY 7 DAYS (NEEDS AN APPOINTMENT BEFORE NEXT REFILL) 21  Yes Bartolome Wright MD   metoprolol tartrate (LOPRESSOR) 25 mg tablet TAKE 1 TABLET TWICE A DAY 21  Yes Sandor Sosa NP   traZODone (DESYREL) 150 mg tablet TAKE 1 TABLET NIGHTLY (NEED APPOINTMENT FOR ADDITIONAL REFILLS FOR CHRONIC MEDICAL CARE MANAGEMENT) 21  Yes Bartolome Wright MD   diclofenac (VOLTAREN) 1 % gel APPLY 2GM TO AFFECTED AREA 4 TIMES DAILY FOR 30 DAYS 2/10/21  Yes Bartolome Wright MD   atorvastatin (LIPITOR) 10 mg tablet TAKE 1 TABLET DAILY 1/15/21  Yes Bartolome Wright MD   calcium 500 mg Tab Take 1 Tab by mouth daily. Yes Provider, Historical   Cholecalciferol, Vitamin D3, (VITAMIN D) 1,000 unit Cap Take 1 Tab by mouth daily. Yes Provider, Historical   fish oil-dha-epa (FISH OIL) 1,200-144-216 mg Cap Take 2 Caps by mouth daily. Yes Provider, Historical   aspirin delayed-release 81 mg tablet Take 81 mg by mouth daily. Patient not taking: Reported on 11/15/2021    Provider, Historical       No Known Allergies    Objective:  Visit Vitals  BP (!) 150/97 (BP 1 Location: Right arm)   Pulse (!) 56   Temp 98.2 °F (36.8 °C)   Resp 15   Ht 5' 2\" (1.575 m)   Wt 135 lb 6.4 oz (61.4 kg)   SpO2 97%   BMI 24.76 kg/m²    Body mass index is 24.76 kg/m². Problem List: Reviewed with patient and discussed risk factors.     Patient Active Problem List   Diagnosis Code    Arthritis M19.90    Hyperlipidemia E78.5    Primary insomnia F51.01    Essential hypertension I10       PSH: Reviewed with patient  Past Surgical History:   Procedure Laterality Date    ENDOSCOPY, COLON, DIAGNOSTIC  3/7/2012/    repeat in 5 years due family hx    HX CATARACT REMOVAL Bilateral     HX GYN          HX ORTHOPAEDIC      rotator cuff tear SH: Reviewed with patient  Social History     Tobacco Use    Smoking status: Never Smoker    Smokeless tobacco: Never Used   Vaping Use    Vaping Use: Never used   Substance Use Topics    Alcohol use: Yes     Comment: socially    Drug use: No       FH: Reviewed with patient  Family History   Problem Relation Age of Onset    Hypertension Mother     Stroke Mother     Cancer Father         leukemia       Current medical providers:    Patient Care Team:  Gustavo Stein MD as PCP - General (Internal Medicine)  Gustavo Stein MD as PCP - [address] [place] [address] [facility]  Guy Ventura MD (Cardiology)    Plan:    Diagnoses and all orders for this visit:    Medicare annual wellness visit, subsequent  . Age appropriate Health screening and immunization discussed with patient. ACP (advance care planning)  She has an Advanced directive and will bring a copy for our records. Health Maintenance   Topic Date Due    Lipid Screen  2021    Medicare Yearly Exam  2021    Breast Cancer Screen Mammogram  2022    DTaP/Tdap/Td series (2 - Td or Tdap) 2023    Colorectal Cancer Screening Combo  2027    Hepatitis C Screening  Completed    Bone Densitometry (Dexa) Screening  Completed    Shingrix Vaccine Age 50>  Completed    Flu Vaccine  Completed    COVID-19 Vaccine  Completed    Pneumococcal 65+ years  Completed          Urinary/ Fecal Incontinence: No     Regular physical exercise: Yes, training class 2 x weekly, cardio    Patient verbalized understanding of information presented. AVS and Medicare Part B Preventive Services Table printed and given to pt and reviewed. See table for findings under Recommendation and Scheduled. All of the patient's questions were answered. Jeff Raygoza (: 1950) is a 73 y.o. female, established patient, here for evaluation of the following chief complaint(s):   Annual Wellness Visit (right arm has been bothering her)     Written by Shira Mac Noel Gibbons, as dictated by Dr. Griselda Brock MD.      ASSESSMENT/PLAN:  Below is the assessment and plan developed based on review of pertinent history, physical exam, labs, studies, and medications. 1. Essential hypertension  Continue taking metoprolol 25 mg BID as prescribed. Check BP at home at different times during different days and keep a log of the readings. Let me know what the readings are. Ordered labs to check metabolic panel and CBC levels. Waiting for results. -     METABOLIC PANEL, COMPREHENSIVE; Future  -     CBC W/O DIFF; Future    2. Mixed hyperlipidemia  Continue taking Lipitor 10 mg as prescribed. Ordered labs to check lipid panel. Waiting for results. -     LIPID PANEL; Future    3. Primary insomnia  Well controlled on current medication. Continue taking trazodone 150 mg as prescribed. 4. Right arm pain  Most likely secondary to torn muscle in R arm. Advised her not do any weight bearing exercises with her R arm for 2-3 weeks. Let me know if pain does not subside after 2-3 weeks. SUBJECTIVE/OBJECTIVE:  HPI     Patient presents today for an annual wellness visit and follow up on a chronic condition. She takes metoprolol 25 mg BID for HTN. Her BP today is 150/97, 142/82 manual repeat. She d/c aspirin 81 mg on her own. She is s/p cataract surgery on L eye on 9/30 and R eye on 10/12. She has recovered well and notes her vision is great. She c/o pain in her R arm x 1 week. She has been exercising more recently but she notes the pain does not feel like a sore muscle. She received a COVID-19 booster vaccine at the end of October but she does not remember which arm she received it in. She takes Lipitor 10 mg for hyperlipidemia. She takes trazodone 150 mg for insomnia. She notes the medication works well. She takes Fosamax 70 mg for osteoporosis.    Patient Active Problem List   Diagnosis Code    Arthritis M19.90    Hyperlipidemia E78.5    Primary insomnia F51.01    Essential hypertension I10        Current Outpatient Medications on File Prior to Visit   Medication Sig Dispense Refill    alendronate (FOSAMAX) 70 mg tablet TAKE 1 TABLET EVERY 7 DAYS (NEEDS AN APPOINTMENT BEFORE NEXT REFILL) 12 Tablet 3    metoprolol tartrate (LOPRESSOR) 25 mg tablet TAKE 1 TABLET TWICE A  Tablet 3    traZODone (DESYREL) 150 mg tablet TAKE 1 TABLET NIGHTLY (NEED APPOINTMENT FOR ADDITIONAL REFILLS FOR CHRONIC MEDICAL CARE MANAGEMENT) 90 Tablet 3    diclofenac (VOLTAREN) 1 % gel APPLY 2GM TO AFFECTED AREA 4 TIMES DAILY FOR 30 DAYS 100 g 1    atorvastatin (LIPITOR) 10 mg tablet TAKE 1 TABLET DAILY 90 Tab 3    calcium 500 mg Tab Take 1 Tab by mouth daily.  Cholecalciferol, Vitamin D3, (VITAMIN D) 1,000 unit Cap Take 1 Tab by mouth daily.  fish oil-dha-epa (FISH OIL) 1,200-144-216 mg Cap Take 2 Caps by mouth daily.  aspirin delayed-release 81 mg tablet Take 81 mg by mouth daily. (Patient not taking: Reported on 11/15/2021)       No current facility-administered medications on file prior to visit.        No Known Allergies    Past Medical History:   Diagnosis Date    Arthritis     Hypercholesterolemia     Sun-damaged skin     Sunburn, blistering        Past Surgical History:   Procedure Laterality Date    ENDOSCOPY, COLON, DIAGNOSTIC  3/7/2012/    repeat in 5 years due family hx    HX CATARACT REMOVAL Bilateral     HX GYN          HX ORTHOPAEDIC      rotator cuff tear       Family History   Problem Relation Age of Onset    Hypertension Mother     Stroke Mother     Cancer Father         leukemia       Social History     Socioeconomic History    Marital status:      Spouse name: Not on file    Number of children: Not on file    Years of education: Not on file    Highest education level: Not on file   Occupational History    Not on file   Tobacco Use    Smoking status: Never Smoker    Smokeless tobacco: Never Used   Vaping Use    Vaping Use: Never used   Substance and Sexual Activity    Alcohol use: Yes     Comment: socially    Drug use: No    Sexual activity: Yes     Partners: Male   Other Topics Concern    Not on file   Social History Narrative    Not on file     Social Determinants of Health     Financial Resource Strain:     Difficulty of Paying Living Expenses: Not on file   Food Insecurity:     Worried About Running Out of Food in the Last Year: Not on file    Marycarmen of Food in the Last Year: Not on file   Transportation Needs:     Lack of Transportation (Medical): Not on file    Lack of Transportation (Non-Medical): Not on file   Physical Activity:     Days of Exercise per Week: Not on file    Minutes of Exercise per Session: Not on file   Stress:     Feeling of Stress : Not on file   Social Connections:     Frequency of Communication with Friends and Family: Not on file    Frequency of Social Gatherings with Friends and Family: Not on file    Attends Evangelical Services: Not on file    Active Member of [address] [place] [address] or Organizations: Not on file    Attends Club or Organization Meetings: Not on file    Marital Status: Not on file   Intimate Partner Violence:     Fear of Current or Ex-Partner: Not on file    Emotionally Abused: Not on file    Physically Abused: Not on file    Sexually Abused: Not on file   Housing Stability:     Unable to Pay for Housing in the Last Year: Not on file    Number of [place] in the Last Year: Not on file    Unstable Housing in the Last Year: Not on file       No visits with results within 3 Month(s) from this visit. Latest known visit with results is:    Ancillary Procedure on 03/25/2021   Component Date Value Ref Range Status    Target HR 03/25/2021 151  bpm Final    Baseline HR 03/25/2021 54  BPM Final    Baseline BP 03/25/2021 116  mmHg Final    O2 sat rest 03/25/2021 98  % Final    O2 sat peak 03/25/2021 98  % Final    Stress Base Diastolic BP 66/23/6561 66  mmHg Final    Stress Base Systolic BP 22/10/5099 936  mmHg Final    Stress Base Diastolic BP 05/34/4036 70  mmHg Final    Post peak HR 03/25/2021 102  BPM Final    Percent HR 03/25/2021 68  % Final    Stress Rate Pressure Product 03/25/2021 11,220  BPM*mmHg Final    ST Elevation (mm) 03/25/2021 0  mm Final    ST Depression (mm) 03/25/2021 0  mm Final       Review of Systems   Constitutional: Negative for activity change, fatigue and unexpected weight change. HENT: Negative for congestion, hearing loss, rhinorrhea and sore throat. Eyes: Negative for discharge. Respiratory: Negative for cough, chest tightness and shortness of breath. Cardiovascular: Negative for leg swelling. Gastrointestinal: Negative for abdominal pain, constipation and diarrhea. Genitourinary: Negative for dysuria, flank pain, frequency and urgency. Musculoskeletal: Positive for arthralgias (R arm). Negative for back pain and myalgias. Skin: Negative for color change and rash. Neurological: Negative for dizziness, light-headedness and headaches. Psychiatric/Behavioral: Negative for dysphoric mood and sleep disturbance. The patient is not nervous/anxious. Visit Vitals  BP (!) 142/82 (BP 1 Location: Left upper arm)   Pulse (!) 56   Temp 98.2 °F (36.8 °C)   Resp 15   Ht 5' 2\" (1.575 m)   Wt 135 lb 6.4 oz (61.4 kg)   SpO2 97%   BMI 24.76 kg/m²       Physical Exam  Vitals and nursing note reviewed. Constitutional:       General: She is not in acute distress. Appearance: Normal appearance. She is well-developed. She is not diaphoretic. HENT:      Right Ear: External ear normal.      Left Ear: External ear normal.   Eyes:      General: No scleral icterus. Right eye: No discharge. Left eye: No discharge. Extraocular Movements: Extraocular movements intact. Conjunctiva/sclera: Conjunctivae normal.   Cardiovascular:      Rate and Rhythm: Normal rate and regular rhythm.    Pulmonary:      Effort: Pulmonary effort is normal.      Breath sounds: Normal breath sounds. No wheezing. Abdominal:      General: Bowel sounds are normal.      Palpations: Abdomen is soft. Tenderness: There is no abdominal tenderness. Musculoskeletal:      Cervical back: Normal range of motion and neck supple. Lymphadenopathy:      Cervical: No cervical adenopathy. Neurological:      Mental Status: She is alert and oriented to person, place, and time. Psychiatric:         Mood and Affect: Mood and affect normal.       An electronic signature was used to authenticate this note.   -- Stephanie Jackson

## 2021-11-30 NOTE — TELEPHONE ENCOUNTER
My Chart message sent back to patient
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 I have been sleeping more than normal, however (9-10 hours/day) and feel lethargic. Can I try reducing the medication to once/day and see if the sinus tachycardia remains under control? Thank you.   Butch Bueno
She can try reducing to 12.5 mg po bid. The regular metoprolol won't cover a full 24 hours, so still good to keep bid dosing, but reduction in dose is fine.
Pt states> abd pain worse this evening, history of AAA". Pt denies nausea.

## 2021-12-09 RX ORDER — ATORVASTATIN CALCIUM 10 MG/1
TABLET, FILM COATED ORAL
Qty: 90 TABLET | Refills: 3 | Status: SHIPPED | OUTPATIENT
Start: 2021-12-09

## 2022-03-03 ENCOUNTER — OFFICE VISIT (OUTPATIENT)
Dept: CARDIOLOGY CLINIC | Age: 72
End: 2022-03-03
Payer: MEDICARE

## 2022-03-03 VITALS
DIASTOLIC BLOOD PRESSURE: 78 MMHG | OXYGEN SATURATION: 98 % | HEIGHT: 62 IN | WEIGHT: 133 LBS | RESPIRATION RATE: 16 BRPM | BODY MASS INDEX: 24.48 KG/M2 | SYSTOLIC BLOOD PRESSURE: 134 MMHG | HEART RATE: 63 BPM

## 2022-03-03 DIAGNOSIS — I49.1 PAC (PREMATURE ATRIAL CONTRACTION): ICD-10-CM

## 2022-03-03 DIAGNOSIS — I49.3 PVC (PREMATURE VENTRICULAR CONTRACTION): ICD-10-CM

## 2022-03-03 DIAGNOSIS — I10 ESSENTIAL HYPERTENSION: ICD-10-CM

## 2022-03-03 DIAGNOSIS — I47.1 PSVT (PAROXYSMAL SUPRAVENTRICULAR TACHYCARDIA) (HCC): Primary | ICD-10-CM

## 2022-03-03 PROCEDURE — G8754 DIAS BP LESS 90: HCPCS | Performed by: INTERNAL MEDICINE

## 2022-03-03 PROCEDURE — 1090F PRES/ABSN URINE INCON ASSESS: CPT | Performed by: INTERNAL MEDICINE

## 2022-03-03 PROCEDURE — G8399 PT W/DXA RESULTS DOCUMENT: HCPCS | Performed by: INTERNAL MEDICINE

## 2022-03-03 PROCEDURE — G8427 DOCREV CUR MEDS BY ELIG CLIN: HCPCS | Performed by: INTERNAL MEDICINE

## 2022-03-03 PROCEDURE — 1101F PT FALLS ASSESS-DOCD LE1/YR: CPT | Performed by: INTERNAL MEDICINE

## 2022-03-03 PROCEDURE — G8536 NO DOC ELDER MAL SCRN: HCPCS | Performed by: INTERNAL MEDICINE

## 2022-03-03 PROCEDURE — G8510 SCR DEP NEG, NO PLAN REQD: HCPCS | Performed by: INTERNAL MEDICINE

## 2022-03-03 PROCEDURE — G0463 HOSPITAL OUTPT CLINIC VISIT: HCPCS | Performed by: INTERNAL MEDICINE

## 2022-03-03 PROCEDURE — 99214 OFFICE O/P EST MOD 30 MIN: CPT | Performed by: INTERNAL MEDICINE

## 2022-03-03 PROCEDURE — G8752 SYS BP LESS 140: HCPCS | Performed by: INTERNAL MEDICINE

## 2022-03-03 PROCEDURE — G9711 PT HX TOT COL OR COLON CA: HCPCS | Performed by: INTERNAL MEDICINE

## 2022-03-03 PROCEDURE — G8420 CALC BMI NORM PARAMETERS: HCPCS | Performed by: INTERNAL MEDICINE

## 2022-03-03 PROCEDURE — G9899 SCRN MAM PERF RSLTS DOC: HCPCS | Performed by: INTERNAL MEDICINE

## 2022-03-18 PROBLEM — I47.10 PSVT (PAROXYSMAL SUPRAVENTRICULAR TACHYCARDIA): Status: ACTIVE | Noted: 2022-03-03

## 2022-03-18 PROBLEM — I10 ESSENTIAL HYPERTENSION: Status: ACTIVE | Noted: 2019-11-11

## 2022-03-18 PROBLEM — I47.1 PSVT (PAROXYSMAL SUPRAVENTRICULAR TACHYCARDIA) (HCC): Status: ACTIVE | Noted: 2022-03-03

## 2022-03-19 PROBLEM — F51.01 PRIMARY INSOMNIA: Status: ACTIVE | Noted: 2019-11-11

## 2022-05-26 DIAGNOSIS — Z72.820 SLEEP DEFICIENT: ICD-10-CM

## 2022-05-26 RX ORDER — TRAZODONE HYDROCHLORIDE 150 MG/1
TABLET ORAL
Qty: 90 TABLET | Refills: 3 | Status: SHIPPED | OUTPATIENT
Start: 2022-05-26

## 2022-06-06 ENCOUNTER — NURSE TRIAGE (OUTPATIENT)
Dept: OTHER | Facility: CLINIC | Age: 72
End: 2022-06-06

## 2022-06-06 ENCOUNTER — TELEPHONE (OUTPATIENT)
Dept: PRIMARY CARE CLINIC | Age: 72
End: 2022-06-06

## 2022-06-06 NOTE — TELEPHONE ENCOUNTER
Received call from [address] at [facility] with Red Flag Complaint. Subjective: Caller states \"I have pain in the back of my upper left arm. It feels like an electric shock when I move my arm a certain way. \"     Current Symptoms: left arm pain    Onset: 1 week ago; intermittent      Pain Severity: 5/10; shock; intermittent    Temperature: denies     What has been tried: No      Recommended disposition: See PCP within 3 Days    Care advice provided, patient verbalizes understanding; denies any other questions or concerns; instructed to call back for any new or worsening symptoms. Patient/Caller agrees with recommended disposition; writer provided warm transfer to Lesia Blount at [facility] for appointment scheduling    Attention Provider: Thank you for allowing me to participate in the care of your patient. The patient was connected to triage in response to information provided to the ECC. Please do not respond through this encounter as the response is not directed to a shared pool.       Reason for Disposition   [1] MODERATE pain (e.g., interferes with normal activities) AND [2] present > 3 days    Protocols used: ARM PAIN-ADULT-

## 2022-06-06 NOTE — TELEPHONE ENCOUNTER
Renee states \"I have pain in the back of my upper left arm. It feels like an electric shock when I move my arm a certain way. \"   Patient would like to be seen this week.

## 2022-06-08 ENCOUNTER — OFFICE VISIT (OUTPATIENT)
Dept: PRIMARY CARE CLINIC | Age: 72
End: 2022-06-08
Payer: MEDICARE

## 2022-06-08 ENCOUNTER — HOSPITAL ENCOUNTER (OUTPATIENT)
Dept: GENERAL RADIOLOGY | Age: 72
Discharge: HOME | End: 2022-06-08
Attending: INTERNAL MEDICINE
Payer: MEDICARE

## 2022-06-08 VITALS
HEART RATE: 82 BPM | WEIGHT: 130.8 LBS | HEIGHT: 62 IN | BODY MASS INDEX: 24.07 KG/M2 | DIASTOLIC BLOOD PRESSURE: 86 MMHG | RESPIRATION RATE: 18 BRPM | TEMPERATURE: 97.1 F | SYSTOLIC BLOOD PRESSURE: 138 MMHG | OXYGEN SATURATION: 93 %

## 2022-06-08 DIAGNOSIS — M79.622 LEFT UPPER ARM PAIN: Primary | ICD-10-CM

## 2022-06-08 DIAGNOSIS — M79.622 LEFT UPPER ARM PAIN: ICD-10-CM

## 2022-06-08 DIAGNOSIS — I10 ESSENTIAL HYPERTENSION: ICD-10-CM

## 2022-06-08 PROCEDURE — 1090F PRES/ABSN URINE INCON ASSESS: CPT | Performed by: INTERNAL MEDICINE

## 2022-06-08 PROCEDURE — 99213 OFFICE O/P EST LOW 20 MIN: CPT | Performed by: INTERNAL MEDICINE

## 2022-06-08 PROCEDURE — 1101F PT FALLS ASSESS-DOCD LE1/YR: CPT | Performed by: INTERNAL MEDICINE

## 2022-06-08 PROCEDURE — G8752 SYS BP LESS 140: HCPCS | Performed by: INTERNAL MEDICINE

## 2022-06-08 PROCEDURE — G8754 DIAS BP LESS 90: HCPCS | Performed by: INTERNAL MEDICINE

## 2022-06-08 PROCEDURE — G8420 CALC BMI NORM PARAMETERS: HCPCS | Performed by: INTERNAL MEDICINE

## 2022-06-08 PROCEDURE — G8427 DOCREV CUR MEDS BY ELIG CLIN: HCPCS | Performed by: INTERNAL MEDICINE

## 2022-06-08 PROCEDURE — 1123F ACP DISCUSS/DSCN MKR DOCD: CPT | Performed by: INTERNAL MEDICINE

## 2022-06-08 PROCEDURE — G8510 SCR DEP NEG, NO PLAN REQD: HCPCS | Performed by: INTERNAL MEDICINE

## 2022-06-08 PROCEDURE — G9899 SCRN MAM PERF RSLTS DOC: HCPCS | Performed by: INTERNAL MEDICINE

## 2022-06-08 PROCEDURE — G9711 PT HX TOT COL OR COLON CA: HCPCS | Performed by: INTERNAL MEDICINE

## 2022-06-08 PROCEDURE — 73060 X-RAY EXAM OF HUMERUS: CPT

## 2022-06-08 PROCEDURE — G8399 PT W/DXA RESULTS DOCUMENT: HCPCS | Performed by: INTERNAL MEDICINE

## 2022-06-08 PROCEDURE — G8536 NO DOC ELDER MAL SCRN: HCPCS | Performed by: INTERNAL MEDICINE

## 2022-06-08 NOTE — PROGRESS NOTES
Results reviewed. Release via [facility] , your X-ray came back fine. Please make an appointment with physical therapy.

## 2022-06-08 NOTE — PROGRESS NOTES
Rigo Phillips (: 1950) is a 81 y.o. female, established patient, here for evaluation of the following chief complaint(s):  Arm Pain (Upper left arm pain, feel like eletric, been going on for 2 weeks. Has been wearing a brace on arm. )     Written by Maryellen Guevara, as dictated by Dr. Issa Chen MD.      ASSESSMENT/PLAN:  Below is the assessment and plan developed based on review of pertinent history, physical exam, labs, studies, and medications. 1. Left upper arm pain  Ordered an xray of her left humerus and provided her with a referral to PT. Can consider referral to Ortho based on results of xray. -     XR HUMERUS LT; Future  -     REFERRAL TO PHYSICAL THERAPY    2. Essential hypertension  BP is well controlled on metoprolol tartrate 25 mg BID. Continue current medication(s). SUBJECTIVE/OBJECTIVE:  HPI   The patient presents today c/o left arm pain x2 weeks. She was doing triceps extension exercises and then developed shooting, electric pain in her left upper arm. States the pain is not present at rest, but occurs with certain movement. Notes that the other day she was marching and the arm movement triggered the pain. She has been wearing a brace to keep her arm stable which helps. Her BP today is elevated at 142/91, improved to 138/86 on manual repeat. She was monitoring her BP at home with readings averaging around 135/85, but stopped checking once her BP was consistently normal. She is on metoprolol tartrate 25 mg BID for HTN.      Patient Active Problem List   Diagnosis Code    Arthritis M19.90    Hyperlipidemia E78.5    Primary insomnia F51.01    Essential hypertension I10    PSVT (paroxysmal supraventricular tachycardia) ([facility]) I47.1        Current Outpatient Medications on File Prior to Visit   Medication Sig Dispense Refill    traZODone (DESYREL) 150 mg tablet TAKE 1 TABLET NIGHTLY (NEED APPOINTMENT FOR ADDITIONAL REFILLS FOR CHRONIC MEDICAL CARE MANAGEMENT) 90 Tablet 3    atorvastatin (LIPITOR) 10 mg tablet TAKE 1 TABLET DAILY 90 Tablet 3    alendronate (FOSAMAX) 70 mg tablet TAKE 1 TABLET EVERY 7 DAYS (NEEDS AN APPOINTMENT BEFORE NEXT REFILL) 12 Tablet 3    metoprolol tartrate (LOPRESSOR) 25 mg tablet TAKE 1 TABLET TWICE A  Tablet 3    diclofenac (VOLTAREN) 1 % gel APPLY 2GM TO AFFECTED AREA 4 TIMES DAILY FOR 30 DAYS 100 g 1    aspirin delayed-release 81 mg tablet Take 81 mg by mouth daily.  calcium 500 mg Tab Take 1 Tab by mouth daily.  Cholecalciferol, Vitamin D3, (VITAMIN D) 1,000 unit Cap Take 1 Tab by mouth daily.  fish oil-dha-epa (FISH OIL) 1,200-144-216 mg Cap Take 2 Caps by mouth daily. No current facility-administered medications on file prior to visit. No Known Allergies    Past Medical History:   Diagnosis Date    Arthritis     Hypercholesterolemia     Sun-damaged skin     Sunburn, blistering        Past Surgical History:   Procedure Laterality Date    ENDOSCOPY, COLON, DIAGNOSTIC  3/7/2012/    repeat in 5 years due family hx    HX CATARACT REMOVAL Bilateral     HX GYN          HX ORTHOPAEDIC      rotator cuff tear       Family History   Problem Relation Age of Onset    Hypertension Mother     Stroke Mother     Cancer Father         leukemia       Social History     Socioeconomic History    Marital status:      Spouse name: Not on file    Number of children: Not on file    Years of education: Not on file    Highest education level: Not on file   Occupational History    Not on file   Tobacco Use    Smoking status: Never Smoker    Smokeless tobacco: Never Used   Vaping Use    Vaping Use: Never used   Substance and Sexual Activity    Alcohol use: Yes     Comment: socially    Drug use: No    Sexual activity: Yes     Partners: Male   Other Topics Concern    Not on file   Social History Narrative    Not on file       No visits with results within 3 Month(s) from this visit.    Latest known visit with results is:   Orders Only on 11/15/2021   Component Date Value Ref Range Status    Cholesterol, total 11/15/2021 171  <200 MG/DL Final    Triglyceride 11/15/2021 113  <150 MG/DL Final    Comment: Based on NCEP-ATP III:  Triglycerides <150 mg/dL  is considered normal, 150-199  mg/dL  borderline high,  200-499 mg/dL high and  greater than or equal to 500  mg/dL very high.  HDL Cholesterol 11/15/2021 56  MG/DL Final    Comment: Based on NCEP ATP III, HDL Cholesterol <40 mg/dL is considered low and >60  mg/dL is elevated.       LDL, calculated 11/15/2021 92.4  0 - 100 MG/DL Final    Comment: Based on the NCEP-ATP: LDL-C concentrations are considered  optimal <100 mg/dL,  near optimal/above Normal 100-129 mg/dL Borderline High: 130-159, High: 160-189  mg/dL Very High: Greater than or equal to 190 mg/dL      VLDL, calculated 11/15/2021 22.6  MG/DL Final    CHOL/HDL Ratio 11/15/2021 3.1  0.0 - 5.0   Final    WBC 11/15/2021 4.5  3.6 - 11.0 K/uL Final    RBC 11/15/2021 4.49  3.80 - 5.20 M/uL Final    HGB 11/15/2021 14.3  11.5 - 16.0 g/dL Final    HCT 11/15/2021 42.8  35.0 - 47.0 % Final    MCV 11/15/2021 95.3  80.0 - 99.0 FL Final    MCH 11/15/2021 31.8  26.0 - 34.0 PG Final    MCHC 11/15/2021 33.4  30.0 - 36.5 g/dL Final    RDW 11/15/2021 12.8  11.5 - 14.5 % Final    PLATELET 71/57/1652 092  150 - 400 K/uL Final    MPV 11/15/2021 11.4  8.9 - 12.9 FL Final    NRBC 11/15/2021 0.0  0  WBC Final    ABSOLUTE NRBC 11/15/2021 0.00  0.00 - 0.01 K/uL Final    Sodium 11/15/2021 137  136 - 145 mmol/L Final    Potassium 11/15/2021 4.3  3.5 - 5.1 mmol/L Final    Chloride 11/15/2021 105  97 - 108 mmol/L Final    CO2 11/15/2021 29  21 - 32 mmol/L Final    Anion gap 11/15/2021 3* 5 - 15 mmol/L Final    Glucose 11/15/2021 92  65 - 100 mg/dL Final    BUN 11/15/2021 12  6 - 20 MG/DL Final    Creatinine 11/15/2021 0.84  0.55 - 1.02 MG/DL Final    BUN/Creatinine ratio 11/15/2021 14  12 - 20 Final    GFR est AA 11/15/2021 >60  >60 ml/min/1.73m2 Final    GFR est non-AA 11/15/2021 >60  >60 ml/min/1.73m2 Final    Comment: Estimated GFR is calculated using the IDMS-traceable Modification of Diet in  Renal Disease (MDRD) Study equation, reported for both  Americans  (GFRAA) and non- Americans (GFRNA), and normalized to 1.73m2 body  surface area. The physician must decide which value applies to the patient.  Calcium 11/15/2021 9.4  8.5 - 10.1 MG/DL Final    Bilirubin, total 11/15/2021 1.8* 0.2 - 1.0 MG/DL Final    ALT (SGPT) 11/15/2021 32  12 - 78 U/L Final    AST (SGOT) 11/15/2021 28  15 - 37 U/L Final    Alk. phosphatase 11/15/2021 64  45 - 117 U/L Final    Protein, total 11/15/2021 7.4  6.4 - 8.2 g/dL Final    Albumin 11/15/2021 4.4  3.5 - 5.0 g/dL Final    Globulin 11/15/2021 3.0  2.0 - 4.0 g/dL Final    A-G Ratio 11/15/2021 1.5  1.1 - 2.2   Final      Review of Systems   Constitutional: Negative for activity change, fatigue and unexpected weight change. HENT: Negative for congestion, hearing loss, rhinorrhea and sore throat. Eyes: Negative for discharge. Respiratory: Negative for cough, chest tightness and shortness of breath. Cardiovascular: Negative for leg swelling. Gastrointestinal: Negative for abdominal pain, constipation and diarrhea. Genitourinary: Negative for dysuria, flank pain, frequency and urgency. Musculoskeletal: Positive for myalgias (left upper arm pain). Negative for arthralgias and back pain. Skin: Negative for color change and rash. Neurological: Negative for dizziness, light-headedness and headaches. Psychiatric/Behavioral: Negative for dysphoric mood and sleep disturbance. The patient is not nervous/anxious.       Visit Vitals  /86 (BP 1 Location: Right arm, BP Patient Position: Sitting)   Pulse 82   Temp 97.1 °F (36.2 °C) (Temporal)   Resp 18   Ht 5' 2\" (1.575 m)   Wt 130 lb 12.8 oz (59.3 kg)   SpO2 93%   BMI 23.92 kg/m² Physical Exam  Vitals and nursing note reviewed. Constitutional:       General: She is not in acute distress. Appearance: Normal appearance. She is well-developed. She is not diaphoretic. HENT:      Right Ear: External ear normal.      Left Ear: External ear normal.   Eyes:      General: No scleral icterus. Right eye: No discharge. Left eye: No discharge. Extraocular Movements: Extraocular movements intact. Conjunctiva/sclera: Conjunctivae normal.   Cardiovascular:      Rate and Rhythm: Normal rate and regular rhythm. Pulmonary:      Effort: Pulmonary effort is normal.      Breath sounds: Normal breath sounds. No wheezing. Abdominal:      General: Bowel sounds are normal.      Palpations: Abdomen is soft. Tenderness: There is no abdominal tenderness. Musculoskeletal:      Cervical back: Normal range of motion and neck supple. Comments: ROM limited on L shoulder    Lymphadenopathy:      Cervical: No cervical adenopathy. Neurological:      General: No focal deficit present. Mental Status: She is alert and oriented to person, place, and time. Psychiatric:         Mood and Affect: Mood and affect normal.       An electronic signature was used to authenticate this note.   -- Cynthia Montague

## 2022-06-08 NOTE — PROGRESS NOTES
1. \"Have you been to the ER, urgent care clinic since your last visit? Hospitalized since your last visit? \" No    2. \"Have you seen or consulted any other health care providers outside of the [address] [place] [address] since your last visit? \" No     3. For patients aged 39-70: Has the patient had a colonoscopy / FIT/ Cologuard? Yes - no Care Gap present      If the patient is female:    4. For patients aged 41-77: Has the patient had a mammogram within the past 2 years? Yes - no Care Gap present      5. For patients aged 21-65: Has the patient had a pap smear? NA - based on age or sex      Chief Complaint   Patient presents with    Arm Pain     Upper left arm pain, feel like eletric, been going on for 2 weeks. Has been wearing a brace on arm.

## 2022-06-23 ENCOUNTER — HOSPITAL ENCOUNTER (OUTPATIENT)
Dept: PHYSICAL THERAPY | Age: 72
Discharge: HOME | End: 2022-06-23
Payer: MEDICARE

## 2022-06-23 PROCEDURE — 97110 THERAPEUTIC EXERCISES: CPT | Performed by: PHYSICAL THERAPY ASSISTANT

## 2022-06-23 PROCEDURE — 97161 PT EVAL LOW COMPLEX 20 MIN: CPT | Performed by: PHYSICAL THERAPY ASSISTANT

## 2022-06-23 NOTE — PROGRESS NOTES
PT INITIAL EVALUATION NOTE - [facility] 2-15    Patient Name: Darnell Simms  Date:2022  : 1950  [x]  Patient  Verified  Payor: Rommel Coronel / Plan: [place] MED[place]ARE PART [place] & B / Product Type: Medicare /    In time: 9:00 am  Out time: 9:40 am  Total Treatment Time (min): 40  Total Timed Codes (min): 10  1:1 Treatment Time ( only): 10   Visit #: 1     Treatment Area: Pain in left upper arm [M79.622]    SUBJECTIVE  Pain Level (0-10 scale): 1  Any medication changes, allergies to medications, adverse drug reactions, diagnosis change, or new procedure performed?: [] No    [x] Yes (see summary sheet for update)  Subjective:    Pt complains of L shoulder pain that started about 1 month ago after performing exercises at home. Pt did not feel pain until the next day. Pt reports she then held her grandson for 1 hour and this caused her elbow to swell for a day or two and then went away. Pt has not perform her exercises or done anything to bother her shoulder over the past few weeks so it has been feeling better. Pt now has no pain w/ her daily life but wants to get back to performing her exercises. Pt went to [facility] and x-rays were negative.    PLOF: exercises, crotcheting  Mechanism of Injury: exercise  Previous Treatment/Compliance: good  PMHx/Surgical Hx: see chart  Work Hx: retired  Living Situation: w/   Pt Goals: \"no pain\"  Barriers: none  Motivation: good  Substance use: none  Cognition: A & O x 4        OBJECTIVE/EXAMINATION  Posture:  Rounded shoulders  Other Observations:  n/a  Functional  and Pinch:  NT  Palpation: TTP over lateral triceps muscle belly    L shoulder AROM: full w/o pain  L shoulder PROM: full w/o pain  Cervical ROM: full w/o pain      UPPER QUARTER   MUSCLE STRENGTH  KEY       R  L  0 - No Contraction  C1, C2 Neck Flex 5  5  1 - Trace   C3 Side Flex  5  5  2 - Poor   C4 Sh Elev  5  5  3 - Fair    C5 Deltoid/Biceps 5  4  4 - Good   C6 Wrist Ext  5  5  5 - Normal   C7 Triceps  5  5      C8 Thumb Ext  5  5      T1 Hand Inst  5  5    MMT: no pain  Neurological: Reflexes / Sensations: normal  Special Tests: - chastity carrillo, - spurlings, - mario's      Modality rationale: decrease edema, decrease inflammation and decrease pain to improve the patients ability to perform ADL's   Min Type Additional Details    [] Estim: []Att   []Unatt        []TENS instruct                  []IFC  []Premod   []NMES                     []Other:  []w/US   []w/ice   []w/heat  Position:  Location:    []  Traction: [] Cervical       []Lumbar                       [] Prone          []Supine                       []Intermittent   []Continuous Lbs:  [] before manual  [] after manual  []w/heat    []  Ultrasound: []Continuous   [] Pulsed at:                           []1MHz   []3MHz Location:  W/cm2:    [] Paraffin         Location:   []w/heat   10 [x]  Ice     []  Heat  []  Ice massage Position: supine  Location: L shoulder    []  Laser  []  Other: Position:  Location:      []  Vasopneumatic Device Pressure:       [] lo [] med [] hi   Temperature:      [x] Skin assessment post-treatment:  [x]intact []redness- no adverse reaction    []redness - adverse reaction:     10 min Therapeutic Exercise:  [x] See flow sheet :   Rationale: increase ROM and increase strength to improve the patients ability to perform ADL's          With   [x] TE   [] TA   [] Neuro   [] SC   [] other: Patient Education: [x] Review HEP    [] Progressed/Changed HEP based on:   [] positioning   [] body mechanics   [] transfers   [] heat/ice application    [x] other: tissues at fault, progression of exercise, ice/heat, positions to avoid     Other Objective/Functional Measures: NT    Pain Level (0-10 scale) post treatment: 0    ASSESSMENT/Changes in Function:     [x]  See Plan of [address] Trivedi, CHIP 6/23/2022

## 2022-06-23 NOTE — PROGRESS NOTES
Physical Therapy at [facility],   a part of [facility]  [address]  [place], [address]  Phone: [phone]  Fax: [phone]    Plan of Care/Statement of Necessity for Physical Therapy Services  2-15    Patient name: Krish Mars  : 1950  Provider#: [id number]  Referral source: Danuta Saenz MD      Medical/Treatment Diagnosis: Pain in left upper arm [M79.622]     Prior Hospitalization: see medical history     Comorbidities: see chart  Prior Level of Function: see chart  Medications: Verified on Patient Summary List  Start of Care: 24      Onset Date: 1 month ago   The Plan of Care and following information is based on the information from the initial evaluation. Assessment/ key information: Pt has signs and symptoms of L triceps strain that affects her ability to perform exercises, perform ADL's, and function. Pt is a good candidate for therapy.     Evaluation Complexity History LOW Complexity : Zero comorbidities / personal factors that will impact the outcome / POC; Examination LOW Complexity : 1-2 Standardized tests and measures addressing body structure, function, activity limitation and / or participation in recreation  ;Presentation LOW Complexity : Stable, uncomplicated  ;Clinical Decision Making LOW Complexity : FOTO score of   Overall Complexity Rating: LOW     Problem List: pain affecting function, decrease ROM, decrease strength, edema affecting function, impaired gait/ balance, decrease ADL/ functional abilitiies, decrease activity tolerance, decrease flexibility/ joint mobility and decrease transfer abilities   Treatment Plan may include any combination of the following: Therapeutic exercise, Therapeutic activities, Neuromuscular re-education, Physical agent/modality, Gait/balance training, Manual therapy and Patient education  Patient / Family readiness to learn indicated by: asking questions  Persons(s) to be included in education: patient (P)  Barriers to Learning/Limitations: None  Patient Goal (s): no pain  Patient Self Reported Health Status: good  Rehabilitation Potential: good    Short Term Goals: To be accomplished in 4 weeks:  Pt will be I w/ HEP in order to take active role in therapy  Pt will apply ice to shoulder at least 1 x day in order to reduce pain  Pt will demonstrate no pain w/ ADL's in order to improve function  Long Term Goals: To be accomplished in 8 weeks:  Pt will demonstrate 5/5 L shoulder strength w/o pain in order to workout  Pt will be able to close car door w/o pain order to better function  Frequency / Duration: Patient to be seen 1-2 times per week for 8 weeks. Patient/ Caregiver education and instruction: self care, activity modification and exercises    [x]  Plan of care has been reviewed with PTA    Certification Period: 6/23/22-9/23/22  Radha Wynn DPT, OCS 6/23/2022    ________________________________________________________________________    I certify that the above Therapy Services are being furnished while the patient is under my care. I agree with the treatment plan and certify that this therapy is necessary.     Physician's Signature:____________________  Date:____________Time: _________         Romaine Yeh MD

## 2022-07-01 ENCOUNTER — HOSPITAL ENCOUNTER (OUTPATIENT)
Dept: PHYSICAL THERAPY | Age: 72
Discharge: HOME | End: 2022-07-01
Payer: MEDICARE

## 2022-07-01 PROCEDURE — 97140 MANUAL THERAPY 1/> REGIONS: CPT | Performed by: PHYSICAL THERAPY ASSISTANT

## 2022-07-01 PROCEDURE — 97110 THERAPEUTIC EXERCISES: CPT | Performed by: PHYSICAL THERAPY ASSISTANT

## 2022-07-01 NOTE — PROGRESS NOTES
PT DAILY TREATMENT NOTE - [facility] 2-15    Patient Name: Laura Bautista  IBDN:  : 1950  [x]  Patient  Verified  Payor: [place] MEDICARE / Plan: [place] MEDICARE PART A & B / Product Type: Medicare /    In time: 9:30 am  Out time: 10:15 am  Total Treatment Time (min): 45  Total Timed Codes (min): 35  1:1 Treatment Time ( only): 25   Visit #:  2    Treatment Area: Pain in left upper arm [M79.182]    SUBJECTIVE  Pain Level (0-10 scale): 0  Any medication changes, allergies to medications, adverse drug reactions, diagnosis change, or new procedure performed?: [x] No    [] Yes (see summary sheet for update)  Subjective functional status/changes:   [] No changes reported  Pt states her shoulder is doing better and she was able to drag her recycling out to the curb w/o pain today.     OBJECTIVE    Modality rationale: decrease edema, decrease inflammation and decrease pain to improve the patients ability to perform ADL's   Min Type Additional Details       [] Estim: []Att   []Unatt    []TENS instruct                  []IFC  []Premod   []NMES                     []Other:  []w/US   []w/ice   []w/heat  Position:  Location:       []  Traction: [] Cervical       []Lumbar                       [] Prone          []Supine                       []Intermittent   []Continuous Lbs:  [] before manual  [] after manual  []w/heat    []  Ultrasound: []Continuous   [] Pulsed                       at: []1MHz   []3MHz Location:  W/cm2:    [] Paraffin         Location:   []w/heat   10 [x]  Ice     []  Heat  []  Ice massage Position: supine  Location: L shoulder    []  Laser  []  Other: Position:  Location:      []  Vasopneumatic Device Pressure:       [] lo [] med [] hi   Temperature:      [x] Skin assessment post-treatment:  [x]intact []redness- no adverse reaction    []redness - adverse reaction:     25 min Therapeutic Exercise:  [x] See flow sheet :   Rationale: increase ROM and increase strength to improve the patients ability to perform ADL's    10 min Manual Therapy: STM to triceps, overhead stretch, stretch in to shoulder ER and flexion    Rationale: decrease pain, increase ROM, increase tissue extensibility and decrease trigger points to improve the patients ability to ADL's            With   [] TE   [] TA   [] Neuro   [] SC   [] other: Patient Education: [x] Review HEP    [] Progressed/Changed HEP based on:   [] positioning   [] body mechanics   [] transfers   [] heat/ice application    [] other:      Other Objective/Functional Measures: NT     Pain Level (0-10 scale) post treatment: 0    ASSESSMENT/Changes in Function:   Pt tolerated there-ex well w/o pain and is no longer TTP over triceps. Patient will continue to benefit from skilled PT services to modify and progress therapeutic interventions, address functional mobility deficits, address ROM deficits, address strength deficits, analyze and address soft tissue restrictions, analyze and cue movement patterns and analyze and modify body mechanics/ergonomics to attain remaining goals.      []  See Plan of Care  []  See progress note/recertification  []  See Discharge Summary         Progress towards goals / Updated goals:  NT    PLAN  [x]  Upgrade activities as tolerated     [x]  Continue plan of care  []  Update interventions per flow sheet       []  Discharge due to:_  []  Other:_      Nita Charles DPT, OCS 7/1/2022

## 2022-07-07 ENCOUNTER — HOSPITAL ENCOUNTER (OUTPATIENT)
Dept: PHYSICAL THERAPY | Age: 72
Discharge: HOME | End: 2022-07-07
Payer: MEDICARE

## 2022-07-07 PROCEDURE — 97140 MANUAL THERAPY 1/> REGIONS: CPT | Performed by: PHYSICAL THERAPY ASSISTANT

## 2022-07-07 PROCEDURE — 97110 THERAPEUTIC EXERCISES: CPT | Performed by: PHYSICAL THERAPY ASSISTANT

## 2022-07-07 NOTE — PROGRESS NOTES
PT DAILY TREATMENT NOTE - [facility] 2-15    Patient Name: Arabella Stein  CPGD:[id number]  : 1950  [x]  Patient  Verified  Payor: [place] MEDICARE / Plan: [place] MEDICARE PART A & B / Product Type: Medicare /    In time: 10:00 am  Out time: 10:50 am  Total Treatment Time (min): 50  Total Timed Codes (min): 45  1:1 Treatment Time ( only): 30   Visit #:  3    Treatment Area: Pain in left upper arm [M79.622]    SUBJECTIVE  Pain Level (0-10 scale):1  Any medication changes, allergies to medications, adverse drug reactions, diagnosis change, or new procedure performed?: [x] No    [] Yes (see summary sheet for update)  Subjective functional status/changes:   [] No changes reported  Pt states she is doing ok but having some increased pain when raising her arm up in front.     OBJECTIVE    Modality rationale: decrease edema, decrease inflammation and decrease pain to improve the patients ability to perform ADL's   Min Type Additional Details       [] Estim: []Att   []Unatt    []TENS instruct                  []IFC  []Premod   []NMES                     []Other:  []w/US   []w/ice   []w/heat  Position:  Location:       []  Traction: [] Cervical       []Lumbar                       [] Prone          []Supine                       []Intermittent   []Continuous Lbs:  [] before manual  [] after manual  []w/heat    []  Ultrasound: []Continuous   [] Pulsed                       at: []1MHz   []3MHz Location:  W/cm2:    [] Paraffin         Location:   []w/heat   5 [x]  Ice     []  Heat  []  Ice massage Position: supine  Location: L shoulder    []  Laser  []  Other: Position:  Location:      []  Vasopneumatic Device Pressure:       [] lo [] med [] hi   Temperature:      [x] Skin assessment post-treatment:  [x]intact []redness- no adverse reaction    []redness - adverse reaction:     30 min Therapeutic Exercise:  [x] See flow sheet :   Rationale: increase ROM and increase strength to improve the patients ability to perform ADL's    15 min Manual Therapy: STM to triceps, overhead stretch, stretch in to shoulder ER and flexion    Rationale: decrease pain, increase ROM, increase tissue extensibility and decrease trigger points to improve the patients ability to ADL's            With   [] TE   [] TA   [] Neuro   [] SC   [] other: Patient Education: [x] Review HEP    [] Progressed/Changed HEP based on:   [] positioning   [] body mechanics   [] transfers   [] heat/ice application    [] other:      Other Objective/Functional Measures: NT     Pain Level (0-10 scale) post treatment: 0    ASSESSMENT/Changes in Function:   Pt has increased TTP over proximal triceps. Pt educated on trigger point release at home. Patient will continue to benefit from skilled PT services to modify and progress therapeutic interventions, address functional mobility deficits, address ROM deficits, address strength deficits, analyze and address soft tissue restrictions, analyze and cue movement patterns and analyze and modify body mechanics/ergonomics to attain remaining goals.      []  See Plan of Care  []  See progress note/recertification  []  See Discharge Summary         Progress towards goals / Updated goals:  NT    PLAN  [x]  Upgrade activities as tolerated     [x]  Continue plan of care  []  Update interventions per flow sheet       []  Discharge due to:_  []  Other:_      Flakita Bonilla DPT, OCS 7/7/2022

## 2022-07-15 ENCOUNTER — HOSPITAL ENCOUNTER (OUTPATIENT)
Dept: PHYSICAL THERAPY | Age: 72
Discharge: HOME | End: 2022-07-15
Payer: MEDICARE

## 2022-07-15 PROCEDURE — 97110 THERAPEUTIC EXERCISES: CPT | Performed by: PHYSICAL THERAPY ASSISTANT

## 2022-07-15 PROCEDURE — 97140 MANUAL THERAPY 1/> REGIONS: CPT | Performed by: PHYSICAL THERAPY ASSISTANT

## 2022-07-15 NOTE — PROGRESS NOTES
PT DAILY TREATMENT NOTE - [facility] 2-15    Patient Name: Dl Holden  Date:7/15/2022  : 1950  [x]  Patient  Verified  Payor: Kayleigh Members / Plan: [place] MEDICARE PART A & B / Product Type: Medicare /    In time: 11:00 am  Out time: 11:45 am  Total Treatment Time (min): 45  Total Timed Codes (min): 35  1:1 Treatment Time ( only): 25   Visit #:  4    Treatment Area: Pain in left upper arm [M79.622]    SUBJECTIVE  Pain Level (0-10 scale): 0  Any medication changes, allergies to medications, adverse drug reactions, diagnosis change, or new procedure performed?: [x] No    [] Yes (see summary sheet for update)  Subjective functional status/changes:   [] No changes reported  Pt states she is doing great w/o pain. Pt thinks the new HEP has really helped get rid of her pain.     OBJECTIVE    Modality rationale: decrease edema, decrease inflammation and decrease pain to improve the patients ability to perform ADL's   Min Type Additional Details       [] Estim: []Att   []Unatt    []TENS instruct                  []IFC  []Premod   []NMES                     []Other:  []w/US   []w/ice   []w/heat  Position:  Location:       []  Traction: [] Cervical       []Lumbar                       [] Prone          []Supine                       []Intermittent   []Continuous Lbs:  [] before manual  [] after manual  []w/heat    []  Ultrasound: []Continuous   [] Pulsed                       at: []1MHz   []3MHz Location:  W/cm2:    [] Paraffin         Location:   []w/heat   10 [x]  Ice     []  Heat  []  Ice massage Position: supine  Location: L shoulder    []  Laser  []  Other: Position:  Location:      []  Vasopneumatic Device Pressure:       [] lo [] med [] hi   Temperature:      [x] Skin assessment post-treatment:  [x]intact []redness- no adverse reaction    []redness - adverse reaction:     25 min Therapeutic Exercise:  [x] See flow sheet :   Rationale: increase ROM and increase strength to improve the patients ability to perform ADL's    10 min Manual Therapy: STM to triceps, overhead stretch, stretch in to shoulder ER and flexion    Rationale: decrease pain, increase ROM, increase tissue extensibility and decrease trigger points to improve the patients ability to ADL's            With   [] TE   [] TA   [] Neuro   [] SC   [] other: Patient Education: [x] Review HEP    [] Progressed/Changed HEP based on:   [] positioning   [] body mechanics   [] transfers   [] heat/ice application    [] other:      Other Objective/Functional Measures:   L shoulder ROM is full w/o pain  MMT: 5/5 w/o pain  Palpation: no TTP     Pain Level (0-10 scale) post treatment: 0    ASSESSMENT/Changes in Function:   Pt has made very good progress over the past few weeks w/ PT and now demonstrates no pain w/ ADL's. Pt is able to exercise w/o pain and was given updated HEP. Pt will be d/c'd from PT at this time.     []  See Plan of Care  []  See progress note/recertification  [x]  See Discharge Summary         Progress towards goals / Updated goals:  See d/c    PLAN  []  Upgrade activities as tolerated     []  Continue plan of care  []  Update interventions per flow sheet       [x]  Discharge due to:_met goals  []  Other:_      Major BALJINDER Rossi, OCS 7/15/2022

## 2022-10-13 RX ORDER — METOPROLOL TARTRATE 25 MG/1
TABLET, FILM COATED ORAL
Qty: 180 TABLET | Refills: 3 | Status: SHIPPED | OUTPATIENT
Start: 2022-10-13

## 2022-10-13 NOTE — TELEPHONE ENCOUNTER
Received refill request for metoprolol 25 mg po tabs. Refill authorized.     Future Appointments   Date Time Provider Yoli Dozier   11/18/2022  9:15 AM Asael Vega MD [facility] BS AMB   3/7/2023  2:20 PM MD JEN Castano BS AMB

## 2022-10-25 ENCOUNTER — TELEPHONE (OUTPATIENT)
Dept: PRIMARY CARE CLINIC | Age: 72
End: 2022-10-25

## 2022-10-25 NOTE — TELEPHONE ENCOUNTER
Patient has vertigo and would like Dr Jose Eduardo Zuleta to send her to a PT to help. She said she went per Dr Jose Eduardo Zuleta a while ago and it helped with one visit.

## 2022-10-26 ENCOUNTER — NURSE TRIAGE (OUTPATIENT)
Dept: OTHER | Facility: CLINIC | Age: 72
End: 2022-10-26

## 2022-10-26 NOTE — TELEPHONE ENCOUNTER
Location of patient: [place]    Received call from Shan at [facility] with [employer]. Subjective: Caller states \"having vertigo\"     Current Symptoms: vertigo is varying from mild to severe. A couple of mornings when getting up the bed will spin. During the day will feel \"off\"  When looking up will feel dizzy. Worst is in the morning. In 2013 had similar experience, saw PT and felt fine after that. No fainting  Does have tendancy to have heat palpitations. Feeling nauseous but no vomiting. Onset: 3 days ago; waxing and waning    Associated Symptoms: NA    Pain Severity:     Temperature: denies     What has been tried:     LMP: NA Pregnant: NA    Recommended disposition: Go to [facility]    Care advice provided, patient verbalizes understanding; denies any other questions or concerns; instructed to call back for any new or worsening symptoms. Patient/Caller agrees with recommended disposition; writer provided warm transfer to Zohra Breen at [facility] for appointment scheduling    Attention Provider: Thank you for allowing me to participate in the care of your patient. The patient was connected to triage in response to information provided to the [facility]. Please do not respond through this encounter as the response is not directed to a shared pool.     Reason for Disposition   Lightheadedness (dizziness) present now, after 2 hours of rest and fluids    Protocols used: Dizziness-ADULT-[place]

## 2022-10-26 NOTE — TELEPHONE ENCOUNTER
Spoke to pt and I asked her what symptoms is she having with the vertigo and pt stated, \"I already spoke to a Nurse Ellen, cant you see it. \" I found the note and read it and asked pt ok, so you are feeling off, and dizzy. Pt said nothing. I said so are you wanting a referral and pt said how am I suppose to remember where I went in 2013 and I said how is Dr. Junior Ferrari suppose to know either. Pt said \"she doesn't have to send me to the same place as before. \" I asked pt if she wanted an appointment and she said \"yea\". I said morning or afternoon she said whatever there is. I said next Wednesday the 2nd. Pt said, \"NO! I need to be seen today, the nurse I spoke to said I need to be seen. \" I said ok I will sent a message to Dr. Junior Ferrari and see where when she can see you, do you still want a referral to a different place. Pt said \"no, I want to be seen by Dr. Junior Ferrari first and she can decide what to do. \" I said thank you.

## 2022-10-26 NOTE — TELEPHONE ENCOUNTER
Spoke to pt and said Dr. Beny Chicas can see her Friday 10/28 at 8:45am. Pt stated perfect. On schedule.

## 2022-10-27 ENCOUNTER — PATIENT MESSAGE (OUTPATIENT)
Dept: PRIMARY CARE CLINIC | Age: 72
End: 2022-10-27

## 2022-10-28 ENCOUNTER — OFFICE VISIT (OUTPATIENT)
Dept: PRIMARY CARE CLINIC | Age: 72
End: 2022-10-28
Payer: MEDICARE

## 2022-10-28 VITALS
OXYGEN SATURATION: 98 % | BODY MASS INDEX: 23.34 KG/M2 | WEIGHT: 126.8 LBS | TEMPERATURE: 97.1 F | DIASTOLIC BLOOD PRESSURE: 72 MMHG | HEIGHT: 62 IN | RESPIRATION RATE: 18 BRPM | SYSTOLIC BLOOD PRESSURE: 126 MMHG | HEART RATE: 75 BPM

## 2022-10-28 DIAGNOSIS — R42 VESTIBULAR DIZZINESS INVOLVING BOTH INNER EARS: Primary | ICD-10-CM

## 2022-10-28 DIAGNOSIS — I10 ESSENTIAL HYPERTENSION: ICD-10-CM

## 2022-10-28 DIAGNOSIS — C44.311 BASAL CELL CARCINOMA (BCC) OF SKIN OF NOSE: ICD-10-CM

## 2022-10-28 PROCEDURE — G9711 PT HX TOT COL OR COLON CA: HCPCS | Performed by: INTERNAL MEDICINE

## 2022-10-28 PROCEDURE — G8536 NO DOC ELDER MAL SCRN: HCPCS | Performed by: INTERNAL MEDICINE

## 2022-10-28 PROCEDURE — 1123F ACP DISCUSS/DSCN MKR DOCD: CPT | Performed by: INTERNAL MEDICINE

## 2022-10-28 PROCEDURE — G8427 DOCREV CUR MEDS BY ELIG CLIN: HCPCS | Performed by: INTERNAL MEDICINE

## 2022-10-28 PROCEDURE — G8399 PT W/DXA RESULTS DOCUMENT: HCPCS | Performed by: INTERNAL MEDICINE

## 2022-10-28 PROCEDURE — 3074F SYST BP LT 130 MM HG: CPT | Performed by: INTERNAL MEDICINE

## 2022-10-28 PROCEDURE — 1101F PT FALLS ASSESS-DOCD LE1/YR: CPT | Performed by: INTERNAL MEDICINE

## 2022-10-28 PROCEDURE — 99214 OFFICE O/P EST MOD 30 MIN: CPT | Performed by: INTERNAL MEDICINE

## 2022-10-28 PROCEDURE — G9899 SCRN MAM PERF RSLTS DOC: HCPCS | Performed by: INTERNAL MEDICINE

## 2022-10-28 PROCEDURE — 3078F DIAST BP <80 MM HG: CPT | Performed by: INTERNAL MEDICINE

## 2022-10-28 PROCEDURE — G8420 CALC BMI NORM PARAMETERS: HCPCS | Performed by: INTERNAL MEDICINE

## 2022-10-28 PROCEDURE — 1090F PRES/ABSN URINE INCON ASSESS: CPT | Performed by: INTERNAL MEDICINE

## 2022-10-28 PROCEDURE — G8752 SYS BP LESS 140: HCPCS | Performed by: INTERNAL MEDICINE

## 2022-10-28 PROCEDURE — G8432 DEP SCR NOT DOC, RNG: HCPCS | Performed by: INTERNAL MEDICINE

## 2022-10-28 PROCEDURE — G8754 DIAS BP LESS 90: HCPCS | Performed by: INTERNAL MEDICINE

## 2022-10-28 RX ORDER — MECLIZINE HYDROCHLORIDE 25 MG/1
25 TABLET ORAL
Qty: 30 TABLET | Refills: 0 | Status: SHIPPED | OUTPATIENT
Start: 2022-10-28 | End: 2022-11-07

## 2022-10-28 NOTE — PROGRESS NOTES
morning woke up with dizziness , feels things around her spinning  Nausea , loss of appetite , no hheadache   Cordell Ramirez (: 1950) is a 81 y.o. female, established patient, here for evaluation of the following chief complaint(s):  Dizziness (Started  morning. Has this back in  but is worse now. Comes in waves. )       ASSESSMENT/PLAN:  Below is the assessment and plan developed based on review of pertinent history, physical exam, labs, studies, and medications. 1. Vestibular dizziness involving both inner ears  I told her to take Antivert as needed as her symptoms are already improving. But she needs to see an ENT first as this is the second time she is having this episode. -     meclizine (ANTIVERT) 25 mg tablet; Take 1 Tablet by mouth three (3) times daily as needed for Dizziness for up to 10 days. , Normal, Disp-30 Tablet, R-0  -     REFERRAL TO ENT-OTOLARYNGOLOGY  2. Essential hypertension  BP is well-controlled on current medication. No change to dosage at this time. 3. Basal cell carcinoma (BCC) of skin of nose  Pathology report reviewed with her and explained to her that she can wait until January as this is not an urgent procedure. SUBJECTIVE/OBJECTIVE:  Patient seen today for dizziness which has been going on since . She woke up on Chris morning and felt things around her were spinning. She was nauseous and had no appetite. She denies any headache or weakness of the extremities. However, she was not able to walk as she was scared she is going to fall. She had a similar kind of episode in  and went for vestibular rehab. Symptoms were significantly improved after 1 session of rehab. She has not had any of these episodes since then. Her blood pressure readings have been running in normal range. She has not had any recent upper respiratory tract infection.     She was seen by dermatologist for a spot on her nose and she got her pathology report back yesterday which was positive for basal cell carcinoma. She is scheduled for surgery in January. Visit Vitals  /72 (BP 1 Location: Left upper arm, BP Patient Position: Sitting) Comment: Manual   Pulse 75   Temp 97.1 °F (36.2 °C) (Temporal)   Resp 18   Ht 5' 2\" (1.575 m)   Wt 126 lb 12.8 oz (57.5 kg)   SpO2 98%   BMI 23.19 kg/m²     Patient Active Problem List   Diagnosis Code    Arthritis M19.90    Hyperlipidemia E78.5    Primary insomnia F51.01    Essential hypertension I10    PSVT (paroxysmal supraventricular tachycardia) ([facility]) I47.1    Basal cell carcinoma (BCC) of skin of nose C44.311        Current Outpatient Medications on File Prior to Visit   Medication Sig Dispense Refill    metoprolol tartrate (LOPRESSOR) 25 mg tablet TAKE 1 TABLET TWICE A  Tablet 3    traZODone (DESYREL) 150 mg tablet TAKE 1 TABLET NIGHTLY (NEED APPOINTMENT FOR ADDITIONAL REFILLS FOR CHRONIC MEDICAL CARE MANAGEMENT) 90 Tablet 3    atorvastatin (LIPITOR) 10 mg tablet TAKE 1 TABLET DAILY 90 Tablet 3    alendronate (FOSAMAX) 70 mg tablet TAKE 1 TABLET EVERY 7 DAYS (NEEDS AN APPOINTMENT BEFORE NEXT REFILL) 12 Tablet 3    diclofenac (VOLTAREN) 1 % gel APPLY 2GM TO AFFECTED AREA 4 TIMES DAILY FOR 30 DAYS 100 g 1    aspirin delayed-release 81 mg tablet Take 81 mg by mouth daily. calcium 500 mg Tab Take 1 Tab by mouth daily. cholecalciferol (VITAMIN D3) 25 mcg (1,000 unit) cap Take 1 Tab by mouth daily. fish oil-dha-epa 1,200-144-216 mg cap Take 2 Caps by mouth daily. No current facility-administered medications on file prior to visit.        No Known Allergies    Past Medical History:   Diagnosis Date    Arthritis     Hypercholesterolemia     Sun-damaged skin     Sunburn, blistering        Past Surgical History:   Procedure Laterality Date    ENDOSCOPY, COLON, DIAGNOSTIC  3/7/2012/    repeat in 5 years due family hx    HX CATARACT REMOVAL Bilateral     HX GYN          HX ORTHOPAEDIC      rotator cuff tear       Family History   Problem Relation Age of Onset    Hypertension Mother     Stroke Mother     Cancer Father         leukemia       Social History     Socioeconomic History    Marital status:      Spouse name: Not on file    Number of children: Not on file    Years of education: Not on file    Highest education level: Not on file   Occupational History    Not on file   Tobacco Use    Smoking status: Never    Smokeless tobacco: Never   Vaping Use    Vaping Use: Never used   Substance and Sexual Activity    Alcohol use: Yes     Comment: socially    Drug use: No    Sexual activity: Yes     Partners: Male   Other Topics Concern    Not on file   Social History Narrative    Not on file     Social Determinants of Health     Financial Resource Strain: Low Risk     Difficulty of Paying Living Expenses: Not hard at all   Food Insecurity: No Food Insecurity    Worried About Running Out of Food in the Last Year: Never true    Ran Out of Food in the Last Year: Never true   Transportation Needs: Not on file   Physical Activity: Not on file   Stress: Not on file   Social Connections: Not on file   Intimate Partner Violence: Not on file   Housing Stability: Not on file       No visits with results within 3 Month(s) from this visit. Latest known visit with results is:   Orders Only on 11/15/2021   Component Date Value Ref Range Status    Cholesterol, total 11/15/2021 171  <200 MG/DL Final    Triglyceride 11/15/2021 113  <150 MG/DL Final    Comment: Based on NCEP-ATP III:  Triglycerides <150 mg/dL  is considered normal, 150-199  mg/dL  borderline high,  200-499 mg/dL high and  greater than or equal to 500  mg/dL very high. HDL Cholesterol 11/15/2021 56  MG/DL Final    Comment: Based on NCEP ATP III, HDL Cholesterol <40 mg/dL is considered low and >60  mg/dL is elevated.       LDL, calculated 11/15/2021 92.4  0 - 100 MG/DL Final    Comment: Based on the NCEP-ATP: LDL-C concentrations are considered  optimal <100 mg/dL,  near optimal/above Normal 100-129 mg/dL Borderline High: 130-159, High: 160-189  mg/dL Very High: Greater than or equal to 190 mg/dL      VLDL, calculated 11/15/2021 22.6  MG/DL Final    CHOL/HDL Ratio 11/15/2021 3.1  0.0 - 5.0   Final    WBC 11/15/2021 4.5  3.6 - 11.0 K/uL Final    RBC 11/15/2021 4.49  3.80 - 5.20 M/uL Final    HGB 11/15/2021 14.3  11.5 - 16.0 g/dL Final    HCT 11/15/2021 42.8  35.0 - 47.0 % Final    MCV 11/15/2021 95.3  80.0 - 99.0 FL Final    MCH 11/15/2021 31.8  26.0 - 34.0 PG Final    MCHC 11/15/2021 33.4  30.0 - 36.5 g/dL Final    RDW 11/15/2021 12.8  11.5 - 14.5 % Final    PLATELET 95/42/2694 700  150 - 400 K/uL Final    MPV 11/15/2021 11.4  8.9 - 12.9 FL Final    NRBC 11/15/2021 0.0  0  WBC Final    ABSOLUTE NRBC 11/15/2021 0.00  0.00 - 0.01 K/uL Final    Sodium 11/15/2021 137  136 - 145 mmol/L Final    Potassium 11/15/2021 4.3  3.5 - 5.1 mmol/L Final    Chloride 11/15/2021 105  97 - 108 mmol/L Final    CO2 11/15/2021 29  21 - 32 mmol/L Final    Anion gap 11/15/2021 3 (A)  5 - 15 mmol/L Final    Glucose 11/15/2021 92  65 - 100 mg/dL Final    BUN 11/15/2021 12  6 - 20 MG/DL Final    Creatinine 11/15/2021 0.84  0.55 - 1.02 MG/DL Final    BUN/Creatinine ratio 11/15/2021 14  12 - 20   Final    GFR est AA 11/15/2021 >60  >60 ml/min/1.73m2 Final    GFR est non-AA 11/15/2021 >60  >60 ml/min/1.73m2 Final    Comment: Estimated GFR is calculated using the IDMS-traceable Modification of Diet in  Renal Disease (MDRD) Study equation, reported for both  Americans  (GFRAA) and non- Americans (GFRNA), and normalized to 1.73m2 body  surface area. The physician must decide which value applies to the patient. Calcium 11/15/2021 9.4  8.5 - 10.1 MG/DL Final    Bilirubin, total 11/15/2021 1.8 (A)  0.2 - 1.0 MG/DL Final    ALT (SGPT) 11/15/2021 32  12 - 78 U/L Final    AST (SGOT) 11/15/2021 28  15 - 37 U/L Final    Alk.  phosphatase 11/15/2021 64  45 - 117 U/L Final Protein, total 11/15/2021 7.4  6.4 - 8.2 g/dL Final    Albumin 11/15/2021 4.4  3.5 - 5.0 g/dL Final    Globulin 11/15/2021 3.0  2.0 - 4.0 g/dL Final    A-G Ratio 11/15/2021 1.5  1.1 - 2.2   Final        Review of Systems   Constitutional:  Positive for fatigue. Negative for activity change and unexpected weight change. HENT:  Negative for congestion, hearing loss, rhinorrhea and sore throat. Eyes:  Negative for discharge. Respiratory:  Negative for cough, chest tightness and shortness of breath. Cardiovascular:  Negative for leg swelling. Gastrointestinal:  Negative for abdominal pain, constipation and diarrhea. Genitourinary:  Negative for dysuria, flank pain, frequency and urgency. Musculoskeletal:  Negative for arthralgias, back pain and myalgias. Skin:  Negative for color change and rash. Neurological:  Positive for dizziness and light-headedness. Negative for headaches. Psychiatric/Behavioral:  Negative for dysphoric mood and sleep disturbance. The patient is not nervous/anxious. Physical Exam    Vitals and nursing note reviewed. Constitutional:       Appearance: Normal appearance. Eyes:      Extraocular Movements: Extraocular movements intact. Pupils: Pupils are equal, round, and reactive to light. ENT : fluid behind left ear. Epiley`s test +ve. Cardiovascular:      Rate and Rhythm: Normal rate and regular rhythm. Pulmonary:      Effort: Pulmonary effort is normal.      Breath sounds: Normal breath sounds. Abdominal:      General: Bowel sounds are normal. There is no distension. Palpations: Abdomen is soft. Musculoskeletal:         General: No swelling. Normal range of motion. Cervical back: Normal range of motion and neck supple. Right lower leg: No edema. Left lower leg: No edema. Neurological:      General: No focal deficit present. Mental Status:  Alert and oriented to person, place, and time. Motor: No weakness.    Psychiatric: Mood and Affect: Mood normal.         Behavior: Behavior normal.          An electronic signature was used to authenticate this note.   -- Gita Chavez MD

## 2022-10-28 NOTE — PROGRESS NOTES
1. \"Have you been to the ER, urgent care clinic since your last visit? Hospitalized since your last visit? \" No    2. \"Have you seen or consulted any other health care providers outside of the [address] [place] [address] since your last visit? \" Yes When: Derma      3. For patients aged 39-70: Has the patient had a colonoscopy / FIT/ Cologuard? Yes - no Care Gap present      If the patient is female:    4. For patients aged 41-77: Has the patient had a mammogram within the past 2 years? Yes - no Care Gap present      5. For patients aged 21-65: Has the patient had a pap smear? NA - based on age or sex    Chief Complaint   Patient presents with    Dizziness     Started Sunday morning. Has this back in 2013 but is worse now. Comes in waves.

## 2022-11-17 RX ORDER — ATORVASTATIN CALCIUM 10 MG/1
TABLET, FILM COATED ORAL
Qty: 90 TABLET | Refills: 3 | Status: SHIPPED | OUTPATIENT
Start: 2022-11-17

## 2022-11-18 ENCOUNTER — OFFICE VISIT (OUTPATIENT)
Dept: PRIMARY CARE CLINIC | Age: 72
End: 2022-11-18
Payer: MEDICARE

## 2022-11-18 VITALS
DIASTOLIC BLOOD PRESSURE: 87 MMHG | BODY MASS INDEX: 23.85 KG/M2 | SYSTOLIC BLOOD PRESSURE: 131 MMHG | TEMPERATURE: 97.1 F | HEIGHT: 62 IN | WEIGHT: 129.6 LBS | HEART RATE: 56 BPM | RESPIRATION RATE: 16 BRPM | OXYGEN SATURATION: 97 %

## 2022-11-18 DIAGNOSIS — M85.89 OSTEOPENIA OF MULTIPLE SITES: ICD-10-CM

## 2022-11-18 DIAGNOSIS — I10 ESSENTIAL HYPERTENSION: ICD-10-CM

## 2022-11-18 DIAGNOSIS — H81.90 VESTIBULAR DIZZINESS: ICD-10-CM

## 2022-11-18 DIAGNOSIS — Z00.00 MEDICARE ANNUAL WELLNESS VISIT, SUBSEQUENT: Primary | ICD-10-CM

## 2022-11-18 DIAGNOSIS — E78.2 MIXED HYPERLIPIDEMIA: ICD-10-CM

## 2022-11-18 DIAGNOSIS — Z71.89 ACP (ADVANCE CARE PLANNING): ICD-10-CM

## 2022-11-18 DIAGNOSIS — F51.01 PRIMARY INSOMNIA: ICD-10-CM

## 2022-11-18 DIAGNOSIS — Z23 NEED FOR VACCINATION AGAINST STREPTOCOCCUS PNEUMONIAE: ICD-10-CM

## 2022-11-18 PROCEDURE — 3078F DIAST BP <80 MM HG: CPT | Performed by: INTERNAL MEDICINE

## 2022-11-18 PROCEDURE — G8427 DOCREV CUR MEDS BY ELIG CLIN: HCPCS | Performed by: INTERNAL MEDICINE

## 2022-11-18 PROCEDURE — G8754 DIAS BP LESS 90: HCPCS | Performed by: INTERNAL MEDICINE

## 2022-11-18 PROCEDURE — G8510 SCR DEP NEG, NO PLAN REQD: HCPCS | Performed by: INTERNAL MEDICINE

## 2022-11-18 PROCEDURE — G8536 NO DOC ELDER MAL SCRN: HCPCS | Performed by: INTERNAL MEDICINE

## 2022-11-18 PROCEDURE — G8420 CALC BMI NORM PARAMETERS: HCPCS | Performed by: INTERNAL MEDICINE

## 2022-11-18 PROCEDURE — 1090F PRES/ABSN URINE INCON ASSESS: CPT | Performed by: INTERNAL MEDICINE

## 2022-11-18 PROCEDURE — G0439 PPPS, SUBSEQ VISIT: HCPCS | Performed by: INTERNAL MEDICINE

## 2022-11-18 PROCEDURE — G8399 PT W/DXA RESULTS DOCUMENT: HCPCS | Performed by: INTERNAL MEDICINE

## 2022-11-18 PROCEDURE — G9711 PT HX TOT COL OR COLON CA: HCPCS | Performed by: INTERNAL MEDICINE

## 2022-11-18 PROCEDURE — 1101F PT FALLS ASSESS-DOCD LE1/YR: CPT | Performed by: INTERNAL MEDICINE

## 2022-11-18 PROCEDURE — G8752 SYS BP LESS 140: HCPCS | Performed by: INTERNAL MEDICINE

## 2022-11-18 PROCEDURE — 1123F ACP DISCUSS/DSCN MKR DOCD: CPT | Performed by: INTERNAL MEDICINE

## 2022-11-18 PROCEDURE — G9899 SCRN MAM PERF RSLTS DOC: HCPCS | Performed by: INTERNAL MEDICINE

## 2022-11-18 PROCEDURE — 3074F SYST BP LT 130 MM HG: CPT | Performed by: INTERNAL MEDICINE

## 2022-11-18 PROCEDURE — 99213 OFFICE O/P EST LOW 20 MIN: CPT | Performed by: INTERNAL MEDICINE

## 2022-11-18 NOTE — PROGRESS NOTES
Chief Complaint   Patient presents with    Annual Wellness Visit     Fasomax check       Visit Vitals  /87 (BP 1 Location: Left upper arm)   Pulse (!) 56   Temp 97.1 °F (36.2 °C)   Resp 16   Ht 5' 2\" (1.575 m)   Wt 129 lb 9.6 oz (58.8 kg)   SpO2 97%   BMI 23.70 kg/m²       1. Have you been to the ER, urgent care clinic since your last visit? Hospitalized since your last visit? No    2. Have you seen or consulted any other health care providers outside of the [address] [place] [address] since your last visit? Include any pap smears or colon screening. Yes Dermatologist, [facility]      3. For patients aged 39-70: Has the patient had a colonoscopy / FIT/ Cologuard? Yes - Care Gap present. Most recent result on file      If the patient is female:    4. For patients aged 41-77: Has the patient had a mammogram within the past 2 years? Yes - no Care Gap present      5. For patients aged 21-65: Has the patient had a pap smear? No            Fili Anguiano is a 81 y.o. female and presents for Annual Medicare Wellness Visit. Assessment of cognitive impairment: Alert and oriented x 4. Depression Screen:   3 most recent PHQ Screens 11/15/2022   Little interest or pleasure in doing things Several days   Feeling down, depressed, irritable, or hopeless Several days   Total Score PHQ 2 2   Trouble falling or staying asleep, or sleeping too much -   Feeling tired or having little energy -   Poor appetite, weight loss, or overeating -   Feeling bad about yourself - or that you are a failure or have let yourself or your family down -   Trouble concentrating on things such as school, work, reading, or watching TV -   Moving or speaking so slowly that other people could have noticed; or the opposite being so fidgety that others notice -   Thoughts of being better off dead, or hurting yourself in some way -   PHQ 9 Score -       Fall Risk Assessment:    Fall Risk Assessment, last 12 mths 11/18/2022   Able to walk?  Yes   Fall in past 12 months? 0   Do you feel unsteady? 0   Are you worried about falling 0   Is the gait abnormal? -   Number of falls in past 12 months -   Fall with injury? -       Abuse Screen:   Abuse Screening Questionnaire 11/15/2022   Do you ever feel afraid of your partner? N   Are you in a relationship with someone who physically or mentally threatens you? N   Is it safe for you to go home? Y       Activities of Daily Living:  Self-care. Requires assistance with: no ADLs  Patient handle his/her own medications  Yes Use of pill box  no  Activities of Daily Living:   ADL Assessment 11/18/2022   Feeding yourself No Help Needed   Getting from bed to chair No Help Needed   Getting dressed No Help Needed   Bathing or showering No Help Needed   Walk across the room (includes cane/walker) No Help Needed   Using the telphone No Help Needed   Taking your medications No Help Needed   Preparing meals No Help Needed   Managing money (expenses/bills) No Help Needed   Moderately strenuous housework (laundry) No Help Needed   Shopping for personal items (toiletries/medicines) No Help Needed   Shopping for groceries No Help Needed   Driving No Help Needed   Climbing a flight of stairs No Help Needed   Getting to places beyond walking distances No Help Needed       Health Maintenance:  Daily Aspirin: yes  Bone Density: up to date  Glaucoma Screening: yes  Immunizations:    Tetanus: up to date. Influenza: up to date. Shingles: up to date. PPSV-23: up to date. Second dose sent to the pharmacy  Prevnar-13:  up to date - . [id number]: up to date    Cancer screening:    Cervical: na due to age. Breast: up to date. Colon:up to date - - 5 year plan       Alcohol Risk Screen:   On any occasion during the past 3 months, have you had more than 3 drinks(female) or 4 drinks (male) containing alcohol in one? Yes  Do you average more than 7 drinks (female) or 14 drinks (male) per week?   No  Type and amount:2 Glasses of wine    Hearing Loss:  denies any hearing loss    Vision Loss:   Wears glasses, contact lenses, or have any other visual impairment  Yes    Adult Nutrition Screen:  No risk factors noted. Advance Care Planning:   End of Life Planning: has an advanced directive - a copy HAS NOT been provided. ,   Offered [employer] [place] ACP-Facilitator appointment no      Medications/Allergies: Reviewed with patient  Prior to Admission medications    Medication Sig Start Date End Date Taking? Authorizing Provider   atorvastatin (LIPITOR) 10 mg tablet TAKE 1 TABLET DAILY 11/17/22  Yes Virginia Bentley MD   metoprolol tartrate (LOPRESSOR) 25 mg tablet TAKE 1 TABLET TWICE A DAY 10/13/22  Yes Neisha Iglesias NP   traZODone (DESYREL) 150 mg tablet TAKE 1 TABLET NIGHTLY (NEED APPOINTMENT FOR ADDITIONAL REFILLS FOR CHRONIC MEDICAL CARE MANAGEMENT) 5/26/22  Yes Anna Roberts NP   alendronate (FOSAMAX) 70 mg tablet TAKE 1 TABLET EVERY 7 DAYS (NEEDS AN APPOINTMENT BEFORE NEXT REFILL) 11/11/21  Yes Virginia Bentley MD   diclofenac (VOLTAREN) 1 % gel APPLY 2GM TO AFFECTED AREA 4 TIMES DAILY FOR 30 DAYS 2/10/21  Yes Virginia Bentley MD   aspirin delayed-release 81 mg tablet Take 81 mg by mouth daily. Yes Provider, Historical   calcium 500 mg Tab Take 1 Tab by mouth daily. Yes Provider, Historical   cholecalciferol (VITAMIN D3) 25 mcg (1,000 unit) cap Take 1 Tab by mouth daily. Yes Provider, Historical   fish oil-dha-epa 1,200-144-216 mg cap Take 2 Caps by mouth daily. Yes Provider, Historical       No Known Allergies    Objective:  Visit Vitals  /87 (BP 1 Location: Left upper arm)   Pulse (!) 56   Temp 97.1 °F (36.2 °C)   Resp 16   Ht 5' 2\" (1.575 m)   Wt 129 lb 9.6 oz (58.8 kg)   SpO2 97%   BMI 23.70 kg/m²    Body mass index is 23.7 kg/m². Problem List: Reviewed with patient and discussed risk factors.     Patient Active Problem List   Diagnosis Code    Arthritis M19.90    Hyperlipidemia E78.5    Primary insomnia F51.01    Essential hypertension I10    PSVT (paroxysmal supraventricular tachycardia) (HCC) I47.1    Basal cell carcinoma (BCC) of skin of nose C44.311       PSH: Reviewed with patient  Past Surgical History:   Procedure Laterality Date    ENDOSCOPY, COLON, DIAGNOSTIC  3/7/2012/    repeat in 5 years due family hx    HX CATARACT REMOVAL Bilateral     HX GYN          HX ORTHOPAEDIC      rotator cuff tear        SH: Reviewed with patient  Social History     Tobacco Use    Smoking status: Never    Smokeless tobacco: Never   Vaping Use    Vaping Use: Never used   Substance Use Topics    Alcohol use: Yes     Comment: socially    Drug use: No       FH: Reviewed with patient  Family History   Problem Relation Age of Onset    Hypertension Mother     Stroke Mother     Cancer Father         leukemia       Current medical providers:    Patient Care Team:  Shantel Mosqueda MD as PCP - General (Internal Medicine Physician)  Shantel Mosqueda MD as PCP - [facility] Provider  Maxine Oliver MD (Cardiovascular Disease Physician)    Plan:    Diagnoses and all orders for this visit:    Medicare annual wellness visit, subsequent  . Age appropriate Health screening and immunization discussed with patient. ACP (advance care planning)  She will bring a copy of Advanced Directive for our records.     Need for vaccination against Streptococcus pneumoniae  -     pneumococcal 23-valent (PNEUMOVAX 23) 25 mcg/0.5 mL injection; 0.5 mL by IntraMUSCular route once for 1 dose., Normal, Disp-0.5 mL, R-0       Health Maintenance   Topic Date Due    Lipid Screen  11/15/2022    Medicare Yearly Exam  2022    Depression Screen  11/15/2023    Breast Cancer Screen Mammogram  2024    Colorectal Cancer Screening Combo  2027    DTaP/Tdap/Td series (3 - Td or Tdap) 2032    Hepatitis C Screening  Completed    Bone Densitometry (Dexa) Screening  Completed    Shingrix Vaccine Age 50>  Completed    Flu Vaccine  Completed    COVID-19 Vaccine Completed    Pneumococcal 65+ years  Completed          Urinary/ Fecal Incontinence: No    Regular physical exercise: Yes, online work outs, cardio, strength balance, 30 minutes daily, walking    Patient verbalized understanding of information presented. AVS and Medicare Part B Preventive Services Table printed and given to pt and reviewed. See table for findings under Recommendation and Scheduled. All of the patient's questions were answered.

## 2022-11-18 NOTE — PROGRESS NOTES
Hari Esqueda (: 1950) is a 81 y.o. female, established patient, here for evaluation of the following chief complaint(s): Annual Wellness Visit (Fasomax check) and Follow Up Chronic Condition       ASSESSMENT/PLAN:  Below is the assessment and plan developed based on review of pertinent history, physical exam, labs, studies, and medications. 1. Mixed hyperlipidemia  -     LIPID PANEL; Future  I ordered a lipid panel. I recommend continuing atorvastatin 10 mg daily. 2. Vestibular dizziness  Recommended keeping meclizine in case vertigo returns. 3. Essential hypertension  -     METABOLIC PANEL, COMPREHENSIVE; Future  -     CBC W/O DIFF; Future  I ordered a CMP and CBC. I recommended continuing to take metoprolol 25 mg BID. 4. Primary insomnia  Continue on trazodone 150 mg nightly. 5. Osteopenia of multiple sites  -     DEXA BONE DENSITY STUDY AXIAL; Future  We discussed continuing on fosamax 70 mg every 7 days. .  She currently does weight baring exercises. Her repeat bone density scan is due in 2023      SUBJECTIVE/OBJECTIVE:  HPI  Patient presents today for a [address] Patient is fasting today. She reports that she is doing well. She is fasting for her labs. She did not go to vestibular rehab due to the vertigo ending before her appointment. She took 3-4 tablets of meclizine and reports that it was not helpful. Her vertigo cleared up after 10/31/22  She checks BP occassionally. She states that she sleeps fine. She denies constipation or leg swelling. She has surgery scheduled for dermatology on 23. Mammogram and colonoscopy UTD  She plans to get a DEXA scan in 2023.       Patient Active Problem List   Diagnosis Code    Arthritis M19.90    Hyperlipidemia E78.5    Primary insomnia F51.01    Essential hypertension I10    PSVT (paroxysmal supraventricular tachycardia) (HCC) I47.1    Basal cell carcinoma (BCC) of skin of nose C44.311    Osteopenia of multiple sites M85.89        Current Outpatient Medications on File Prior to Visit   Medication Sig Dispense Refill    atorvastatin (LIPITOR) 10 mg tablet TAKE 1 TABLET DAILY 90 Tablet 3    metoprolol tartrate (LOPRESSOR) 25 mg tablet TAKE 1 TABLET TWICE A  Tablet 3    traZODone (DESYREL) 150 mg tablet TAKE 1 TABLET NIGHTLY (NEED APPOINTMENT FOR ADDITIONAL REFILLS FOR CHRONIC MEDICAL CARE MANAGEMENT) 90 Tablet 3    alendronate (FOSAMAX) 70 mg tablet TAKE 1 TABLET EVERY 7 DAYS (NEEDS AN APPOINTMENT BEFORE NEXT REFILL) 12 Tablet 3    diclofenac (VOLTAREN) 1 % gel APPLY 2GM TO AFFECTED AREA 4 TIMES DAILY FOR 30 DAYS 100 g 1    aspirin delayed-release 81 mg tablet Take 81 mg by mouth daily. calcium 500 mg Tab Take 1 Tab by mouth daily. cholecalciferol (VITAMIN D3) 25 mcg (1,000 unit) cap Take 1 Tab by mouth daily. fish oil-dha-epa 1,200-144-216 mg cap Take 2 Caps by mouth daily. No current facility-administered medications on file prior to visit. No Known Allergies    Past Medical History:   Diagnosis Date    Arthritis     Hypercholesterolemia     Sun-damaged skin     Sunburn, blistering        Past Surgical History:   Procedure Laterality Date    ENDOSCOPY, COLON, DIAGNOSTIC  3/7/2012/    repeat in 5 years due family hx    HX CATARACT REMOVAL Bilateral     HX GYN          HX ORTHOPAEDIC      rotator cuff tear       Family History   Problem Relation Age of Onset    Hypertension Mother     Stroke Mother     Cancer Father         leukemia       Social History     Socioeconomic History    Marital status:      Spouse name: Not on file    Number of children: Not on file    Years of education: Not on file    Highest education level: Not on file   Occupational History    Not on file   Tobacco Use    Smoking status: Never    Smokeless tobacco: Never   Vaping Use    Vaping Use: Never used   Substance and Sexual Activity    Alcohol use: Yes     Comment: socially    Drug use:  No Sexual activity: Yes     Partners: Male   Other Topics Concern    Not on file   Social History Narrative    Not on file     Social Determinants of Health     Financial Resource Strain: Low Risk     Difficulty of Paying Living Expenses: Not hard at all   Food Insecurity: No Food Insecurity    Worried About Running Out of Food in the Last Year: Never true    Ran Out of Food in the Last Year: Never true   Transportation Needs: Not on file   Physical Activity: Not on file   Stress: Not on file   Social Connections: Not on file   Intimate Partner Violence: Not on file   Housing Stability: Not on file       No visits with results within 3 Month(s) from this visit. Latest known visit with results is:   Orders Only on 11/15/2021   Component Date Value Ref Range Status    Cholesterol, total 11/15/2021 171  <200 MG/DL Final    Triglyceride 11/15/2021 113  <150 MG/DL Final    Comment: Based on NCEP-ATP III:  Triglycerides <150 mg/dL  is considered normal, 150-199  mg/dL  borderline high,  200-499 mg/dL high and  greater than or equal to 500  mg/dL very high. HDL Cholesterol 11/15/2021 56  MG/DL Final    Comment: Based on NCEP ATP III, HDL Cholesterol <40 mg/dL is considered low and >60  mg/dL is elevated.       LDL, calculated 11/15/2021 92.4  0 - 100 MG/DL Final    Comment: Based on the NCEP-ATP: LDL-C concentrations are considered  optimal <100 mg/dL,  near optimal/above Normal 100-129 mg/dL Borderline High: 130-159, High: 160-189  mg/dL Very High: Greater than or equal to 190 mg/dL      VLDL, calculated 11/15/2021 22.6  MG/DL Final    CHOL/HDL Ratio 11/15/2021 3.1  0.0 - 5.0   Final    WBC 11/15/2021 4.5  3.6 - 11.0 K/uL Final    RBC 11/15/2021 4.49  3.80 - 5.20 M/uL Final    HGB 11/15/2021 14.3  11.5 - 16.0 g/dL Final    HCT 11/15/2021 42.8  35.0 - 47.0 % Final    MCV 11/15/2021 95.3  80.0 - 99.0 FL Final    MCH 11/15/2021 31.8  26.0 - 34.0 PG Final    MCHC 11/15/2021 33.4  30.0 - 36.5 g/dL Final    RDW 11/15/2021 12.8  11.5 - 14.5 % Final    PLATELET 23/89/6273 254  150 - 400 K/uL Final    MPV 11/15/2021 11.4  8.9 - 12.9 FL Final    NRBC 11/15/2021 0.0  0  WBC Final    ABSOLUTE NRBC 11/15/2021 0.00  0.00 - 0.01 K/uL Final    Sodium 11/15/2021 137  136 - 145 mmol/L Final    Potassium 11/15/2021 4.3  3.5 - 5.1 mmol/L Final    Chloride 11/15/2021 105  97 - 108 mmol/L Final    CO2 11/15/2021 29  21 - 32 mmol/L Final    Anion gap 11/15/2021 3 (A)  5 - 15 mmol/L Final    Glucose 11/15/2021 92  65 - 100 mg/dL Final    BUN 11/15/2021 12  6 - 20 MG/DL Final    Creatinine 11/15/2021 0.84  0.55 - 1.02 MG/DL Final    BUN/Creatinine ratio 11/15/2021 14  12 - 20   Final    GFR est AA 11/15/2021 >60  >60 ml/min/1.73m2 Final    GFR est non-AA 11/15/2021 >60  >60 ml/min/1.73m2 Final    Comment: Estimated GFR is calculated using the IDMS-traceable Modification of Diet in  Renal Disease (MDRD) Study equation, reported for both  Americans  (GFRAA) and non- Americans (GFRNA), and normalized to 1.73m2 body  surface area. The physician must decide which value applies to the patient. Calcium 11/15/2021 9.4  8.5 - 10.1 MG/DL Final    Bilirubin, total 11/15/2021 1.8 (A)  0.2 - 1.0 MG/DL Final    ALT (SGPT) 11/15/2021 32  12 - 78 U/L Final    AST (SGOT) 11/15/2021 28  15 - 37 U/L Final    Alk. phosphatase 11/15/2021 64  45 - 117 U/L Final    Protein, total 11/15/2021 7.4  6.4 - 8.2 g/dL Final    Albumin 11/15/2021 4.4  3.5 - 5.0 g/dL Final    Globulin 11/15/2021 3.0  2.0 - 4.0 g/dL Final    A-G Ratio 11/15/2021 1.5  1.1 - 2.2   Final       Review of Systems   Constitutional:  Negative for activity change, fatigue and unexpected weight change. HENT:  Negative for congestion, hearing loss, rhinorrhea and sore throat. Eyes:  Negative for discharge. Respiratory:  Negative for cough, chest tightness and shortness of breath. Cardiovascular:  Negative for leg swelling.    Gastrointestinal:  Negative for abdominal pain, constipation and diarrhea. Genitourinary:  Negative for dysuria, flank pain, frequency and urgency. Musculoskeletal:  Negative for arthralgias, back pain and myalgias. Skin:  Negative for color change and rash. Neurological:  Negative for light-headedness and headaches. Psychiatric/Behavioral:  Negative for dysphoric mood and sleep disturbance. The patient is not nervous/anxious. Visit Vitals  /87 (BP 1 Location: Left upper arm)   Pulse (!) 56   Temp 97.1 °F (36.2 °C)   Resp 16   Ht 5' 2\" (1.575 m)   Wt 129 lb 9.6 oz (58.8 kg)   SpO2 97%   BMI 23.70 kg/m²       Physical Exam  Vitals and nursing note reviewed. Constitutional:       General: She is not in acute distress. Appearance: Normal appearance. She is well-developed. She is not diaphoretic. HENT:      Right Ear: External ear normal.      Left Ear: External ear normal.   Eyes:      General: No scleral icterus. Right eye: No discharge. Left eye: No discharge. Extraocular Movements: Extraocular movements intact. Conjunctiva/sclera: Conjunctivae normal.   Cardiovascular:      Rate and Rhythm: Normal rate and regular rhythm. Pulmonary:      Effort: Pulmonary effort is normal.      Breath sounds: Normal breath sounds. No wheezing. Abdominal:      General: Bowel sounds are normal.      Palpations: Abdomen is soft. Tenderness: There is no abdominal tenderness. Musculoskeletal:      Cervical back: Normal range of motion and neck supple. Lymphadenopathy:      Cervical: No cervical adenopathy. Neurological:      Mental Status: She is alert and oriented to person, place, and time. Psychiatric:         Mood and Affect: Mood and affect normal.         I, Bri Yung MD, personally performed the services described in this documentation as scribed by Debra Walls in my presence, and it is both accurate and complete. An electronic signature was used to authenticate this note.   -- Debra Walls

## 2022-11-19 LAB
ALBUMIN SERPL-MCNC: 4 G/DL (ref 3.5–5)
ALBUMIN/GLOB SERPL: 1.2 {RATIO} (ref 1.1–2.2)
ALP SERPL-CCNC: 72 U/L (ref 45–117)
ALT SERPL-CCNC: 29 U/L (ref 12–78)
ANION GAP SERPL CALC-SCNC: 4 MMOL/L (ref 5–15)
AST SERPL-CCNC: 24 U/L (ref 15–37)
BILIRUB SERPL-MCNC: 1.8 MG/DL (ref 0.2–1)
BUN SERPL-MCNC: 12 MG/DL (ref 6–20)
BUN/CREAT SERPL: 16 (ref 12–20)
CALCIUM SERPL-MCNC: 9.1 MG/DL (ref 8.5–10.1)
CHLORIDE SERPL-SCNC: 108 MMOL/L (ref 97–108)
CHOLEST SERPL-MCNC: 149 MG/DL
CO2 SERPL-SCNC: 29 MMOL/L (ref 21–32)
CREAT SERPL-MCNC: 0.75 MG/DL (ref 0.55–1.02)
ERYTHROCYTE [DISTWIDTH] IN BLOOD BY AUTOMATED COUNT: 12.7 % (ref 11.5–14.5)
GLOBULIN SER CALC-MCNC: 3.3 G/DL (ref 2–4)
GLUCOSE SERPL-MCNC: 92 MG/DL (ref 65–100)
HCT VFR BLD AUTO: 43.4 % (ref 35–47)
HDLC SERPL-MCNC: 51 MG/DL
HDLC SERPL: 2.9 {RATIO} (ref 0–5)
HGB BLD-MCNC: 14.3 G/DL (ref 11.5–16)
LDLC SERPL CALC-MCNC: 80.8 MG/DL (ref 0–100)
MCH RBC QN AUTO: 31.4 PG (ref 26–34)
MCHC RBC AUTO-ENTMCNC: 32.9 G/DL (ref 30–36.5)
MCV RBC AUTO: 95.2 FL (ref 80–99)
NRBC # BLD: 0 K/UL (ref 0–0.01)
NRBC BLD-RTO: 0 PER 100 WBC
PLATELET # BLD AUTO: 198 K/UL (ref 150–400)
PMV BLD AUTO: 11.2 FL (ref 8.9–12.9)
POTASSIUM SERPL-SCNC: 4.5 MMOL/L (ref 3.5–5.1)
PROT SERPL-MCNC: 7.3 G/DL (ref 6.4–8.2)
RBC # BLD AUTO: 4.56 M/UL (ref 3.8–5.2)
SODIUM SERPL-SCNC: 141 MMOL/L (ref 136–145)
TRIGL SERPL-MCNC: 86 MG/DL (ref ?–150)
VLDLC SERPL CALC-MCNC: 17.2 MG/DL
WBC # BLD AUTO: 4.1 K/UL (ref 3.6–11)

## 2022-11-19 NOTE — PROGRESS NOTES
Results reviewed. Release via [id number] [employer] you are enjoying your weekend with your family. Your blood report is back and I am happy to see your numbers. Cholesterol numbers look great. Continue same dose of Lipitor. Keep up the good work!

## 2023-01-12 ENCOUNTER — HOSPITAL ENCOUNTER (OUTPATIENT)
Dept: BONE DENSITY | Age: 73
Discharge: HOME | End: 2023-01-12
Attending: INTERNAL MEDICINE
Payer: MEDICARE

## 2023-01-12 DIAGNOSIS — M85.89 OSTEOPENIA OF MULTIPLE SITES: ICD-10-CM

## 2023-01-12 PROCEDURE — 77080 DXA BONE DENSITY AXIAL: CPT

## 2023-01-12 NOTE — PROGRESS NOTES
Results reviewed. Release via [employer]  Caridad,.your Bone density scan showed osteopenia ( means bone density is lower than peak density at your age ). I would recommend weight bearing exercises 2-3 times a week. Calcium 500 mg 4 times a week and Vitamin D3 1000 daily dose. Repeat scan in 2 years.

## 2023-01-13 DIAGNOSIS — M81.0 AGE-RELATED OSTEOPOROSIS WITHOUT CURRENT PATHOLOGICAL FRACTURE: ICD-10-CM

## 2023-01-13 RX ORDER — ALENDRONATE SODIUM 70 MG/1
70 TABLET ORAL
Qty: 12 TABLET | Refills: 3 | Status: SHIPPED | OUTPATIENT
Start: 2023-01-13 | End: 2023-04-01

## 2023-03-25 NOTE — PROGRESS NOTES
[facility] Cardiology  Cardiac Electrophysiology Clinic Care Note                  []Initial visit     [x]Established visit     Patient Name: Huy Lund - LRM:4/1/2043 - YZP:[id number]  Primary Cardiologist: None  Electrophysiologist: Lee Alfaro MD     Reason for visit: Follow up PSVT, PACs, PVCs    HPI:  Ms. Ebony Bunn is a 72 y.o. female with history of known PSVT, PACs, & PVCs. She reports doing well, denies palpitations or SOB. Occasionally notes mild chest discomfort at rest, but resolves with taking an additional metoprolol. Continues metoprolol for rate control. Nuclear stress test in 03/2021 showed LVEF 79% without significant ischemia or infarct. BP controlled. Previous:  Intolerant of diltiazem, switched to metoprolol. Noted dizziness & fatigue with 25 mg po bid dosing. Previous loop monitor significant for sinus tachycardia both with & without LBBB. 1700 PVCs on holter in 2009. Stress echo normal then. Assessment and Plan     PSVT/PACs/PVCs: Well controlled with metoprolol, tolerating current dose well. Continue as previously prescribed. Chest discomfort: Rare, not associated with exertion. Atypical.  Ca score in 10/2018 was 30 (RCA). Nuclear stress test in 02/2021 didn't show ischemia or infarct. She will call back if she begins to note any chest discomfort with exertion or if it no longer resolves with an additional metoprolol. HTN: BP controlled. Continue current medication regimen. Follow up with Dr. Fabby Hernández in 1 year. Future Appointments   Date Time Provider Yoli Dozier   4/4/2024  3:20 PM MD JEN Trujillo BS AMB        ____________________________________________________________    Cardiac testing      03/25/21    NUCLEAR CARDIAC STRESS TEST 03/25/2021 3/30/2021    Interpretation Summary  · SPECT: Calculated ejection fraction is 79%.  The TID ratio is 1.04.  · Baseline ECG: Normal sinus rhythm. · Stress test: Negative stress test.  · SPECT: Left ventricular perfusion is probably normal. a low risk stress test.  · SPECT: Myocardial perfusion imaging defect 1: There is a defect that is small in size with a mild reduction in uptake present in the apex location(s) that is predominantly fixed. There is normal wall motion in the defect area. The defect appears to be an artifact caused by breast attenuation. Myocardial perfusion imaging defect 2: There is a defect that is small to medium in size with a moderate reduction in uptake affecting the basal and lateral location(s) that is partially reversible. There is normal wall motion in the defect area. The defect appears to be an artifact. breast  · SPECT: Myocardial perfusion imaging defect 1: caused by breast attenuation. Breast attenuation account for apex and basal lateral wall defect likely    Signed by: William East MD on 3/25/2021  2:40 PM      CT heart with Ca score (10/27/2018): LM 0, LAD 0, LCx 0, RCA 30. Review of Systems    [x]All other systems reviewed and all negative except as written in HPI    [] Patient unable to provide secondary to condition         Past Medical History:   Diagnosis Date    Arthritis     Hypercholesterolemia     Sun-damaged skin     Sunburn, blistering      Past Surgical History:   Procedure Laterality Date    ENDOSCOPY, COLON, DIAGNOSTIC  3/7/2012/    repeat in 5 years due family hx    HX CATARACT REMOVAL Bilateral     HX GYN          HX ORTHOPAEDIC      rotator cuff tear     Social Hx:  reports that she has never smoked. She has never used smokeless tobacco. She reports current alcohol use. She reports that she does not use drugs. Family Hx: family history includes Cancer in her father; Hypertension in her mother; Stroke in her mother.   No Known Allergies       OBJECTIVE:    Physical Exam    Vitals:   Vitals:    23 1340 23 1404   BP: 100/70 112/74   Pulse: 73 Resp: 16    SpO2: 94%    Weight: 134 lb 12.8 oz (61.1 kg)    Height: 5' 2\" (1.575 m)          General:    Alert, cooperative, no distress, appears stated age. Neck:   Supple, no carotid bruit and no JVD. Back:     Symmetric. Lungs:     Clear to auscultation bilaterally. Heart[de-identified]    Regular rate and rhythm. No murmur, click, rub or gallop. Abdomen:     Soft, non-tender. Bowel sounds normal.    MSK:   Extremities normal, atraumatic. Moves extremities independently. Vasc/lymph:   No lower extremity edema. Skin:   Skin color normal. No rashes or lesions on visible areas. Neurologic:   Alert, moves all extremities. Data Review:     Radiology:   XR Results (most recent):  Results from Hospital Encounter encounter on 22    XR HUMERUS LT    Narrative  EXAM: XR HUMERUS LT    INDICATION: Pain in left upper arm. COMPARISON: None. FINDINGS: Two views of the left humerus demonstrate no visible fracture or  malalignment. Incidental imaging of the shoulder and thorax is unremarkable. Incidental imaging of the elbow is unremarkable. The soft tissues are  unremarkable. Impression  1. No visible fracture. CT Results (most recent):  Results from Hospital Encounter encounter on 10/17/18    CT HEART W/O CONT WITH CALCIUM    Narrative  Coronary Artery CT Quantitative Calcium Scoring. Technique: Unenhanced multislice helical limited chest CT. CALCIUM SCORE  0-0 = No evidence of CAD  1-10 = Minimal evidence of CAD   = Mild evidence of CAD  101-400 = Moderate evidence of CAD  >400 = Extensive evidence of CAD      Left main: 0  Left anterior descendin  Left circumflex: 0  Right coronary: 30  Posterior descendin    Total calcium score: 30    Your score of 30 places you in the 50th percentile rank. That means that 50% of  the females at the ages from 74-77 will have a higher calcium score than you.     Chest CT findings: Visualized portions of the lungs demonstrate a calcified  granuloma at the right lung base. Heart size is normal. The visualized  mediastinum contains no mass or adenopathy. The esophagus is not dilated. Visualized upper bowel structures are unremarkable. .    Impression  IMPRESSION: Total calcium score of 30. Mild evidence of plaque. MRI Results (most recent):  Results from Abstract encounter on 10/15/12    MRI MAMMOGRAM SCREENING DIGITAL        No results for input(s): CPK, TROIQ in the last 72 hours. No lab exists for component: CKQMB, CPKMB, BMPP  No results for input(s): NA, K, CL, CO2, BUN, CREA, GLU, PHOS, CA in the last 72 hours. No results for input(s): WBC, HGB, HCT, PLT, HGBEXT, HCTEXT, PLTEXT, HGBEXT, HCTEXT, PLTEXT in the last 72 hours. No results for input(s): PTP, INR, AP, INREXT, INREXT in the last 72 hours. No lab exists for component: PTTP, GPT, SGOT  No results for input(s): CHOL, LDLC in the last 72 hours. No lab exists for component: TGL, HDLC,  HBA1C  No results for input(s): CRP, TSH, TSHEXT, TSHEXT in the last 72 hours. No lab exists for component: ESR        Current meds:    Current Outpatient Medications:     alendronate (FOSAMAX) 70 mg tablet, Take 1 Tablet by mouth every seven (7) days for 12 doses. , Disp: 12 Tablet, Rfl: 3    atorvastatin (LIPITOR) 10 mg tablet, TAKE 1 TABLET DAILY, Disp: 90 Tablet, Rfl: 3    metoprolol tartrate (LOPRESSOR) 25 mg tablet, TAKE 1 TABLET TWICE A DAY, Disp: 180 Tablet, Rfl: 3    traZODone (DESYREL) 150 mg tablet, TAKE 1 TABLET NIGHTLY (NEED APPOINTMENT FOR ADDITIONAL REFILLS FOR CHRONIC MEDICAL CARE MANAGEMENT), Disp: 90 Tablet, Rfl: 3    diclofenac (VOLTAREN) 1 % gel, APPLY 2GM TO AFFECTED AREA 4 TIMES DAILY FOR 30 DAYS, Disp: 100 g, Rfl: 1    cholecalciferol (VITAMIN D3) 25 mcg (1,000 unit) cap, Take 1 Tab by mouth daily. , Disp: , Rfl:     fish oil-dha-epa 1,200-144-216 mg cap, Take 2 Caps by mouth daily. , Disp: , Rfl:     aspirin delayed-release 81 mg tablet, Take 81 mg by mouth daily. (Patient not taking: Reported on 3/27/2023), Disp: , Rfl:     calcium 500 mg Tab, Take 1 Tab by mouth daily.  (Patient not taking: Reported on 3/27/2023), Disp: , Rfl:       Maday Alas, MELBA  [facility]  [address] [place] [address],[address]  [facility]  [phone]      Earline Sanford MD

## 2023-03-27 ENCOUNTER — OFFICE VISIT (OUTPATIENT)
Dept: CARDIOLOGY CLINIC | Age: 73
End: 2023-03-27
Payer: MEDICARE

## 2023-03-27 VITALS
HEART RATE: 73 BPM | BODY MASS INDEX: 24.8 KG/M2 | WEIGHT: 134.8 LBS | DIASTOLIC BLOOD PRESSURE: 74 MMHG | SYSTOLIC BLOOD PRESSURE: 112 MMHG | HEIGHT: 62 IN | RESPIRATION RATE: 16 BRPM | OXYGEN SATURATION: 94 %

## 2023-03-27 DIAGNOSIS — I47.1 PSVT (PAROXYSMAL SUPRAVENTRICULAR TACHYCARDIA) (HCC): Primary | ICD-10-CM

## 2023-03-27 DIAGNOSIS — R07.89 CHEST DISCOMFORT: ICD-10-CM

## 2023-03-27 DIAGNOSIS — I49.3 PVC (PREMATURE VENTRICULAR CONTRACTION): ICD-10-CM

## 2023-03-27 DIAGNOSIS — I49.1 PAC (PREMATURE ATRIAL CONTRACTION): ICD-10-CM

## 2023-03-27 DIAGNOSIS — I10 ESSENTIAL HYPERTENSION: ICD-10-CM

## 2023-03-27 PROCEDURE — G8536 NO DOC ELDER MAL SCRN: HCPCS | Performed by: NURSE PRACTITIONER

## 2023-03-27 PROCEDURE — 3074F SYST BP LT 130 MM HG: CPT | Performed by: NURSE PRACTITIONER

## 2023-03-27 PROCEDURE — G9711 PT HX TOT COL OR COLON CA: HCPCS | Performed by: NURSE PRACTITIONER

## 2023-03-27 PROCEDURE — G8420 CALC BMI NORM PARAMETERS: HCPCS | Performed by: NURSE PRACTITIONER

## 2023-03-27 PROCEDURE — 1123F ACP DISCUSS/DSCN MKR DOCD: CPT | Performed by: NURSE PRACTITIONER

## 2023-03-27 PROCEDURE — 99214 OFFICE O/P EST MOD 30 MIN: CPT | Performed by: NURSE PRACTITIONER

## 2023-03-27 PROCEDURE — G8432 DEP SCR NOT DOC, RNG: HCPCS | Performed by: NURSE PRACTITIONER

## 2023-03-27 PROCEDURE — 1101F PT FALLS ASSESS-DOCD LE1/YR: CPT | Performed by: NURSE PRACTITIONER

## 2023-03-27 PROCEDURE — G9899 SCRN MAM PERF RSLTS DOC: HCPCS | Performed by: NURSE PRACTITIONER

## 2023-03-27 PROCEDURE — G0463 HOSPITAL OUTPT CLINIC VISIT: HCPCS | Performed by: NURSE PRACTITIONER

## 2023-03-27 PROCEDURE — G8427 DOCREV CUR MEDS BY ELIG CLIN: HCPCS | Performed by: NURSE PRACTITIONER

## 2023-03-27 PROCEDURE — 3078F DIAST BP <80 MM HG: CPT | Performed by: NURSE PRACTITIONER

## 2023-03-27 PROCEDURE — 1090F PRES/ABSN URINE INCON ASSESS: CPT | Performed by: NURSE PRACTITIONER

## 2023-03-27 PROCEDURE — G8399 PT W/DXA RESULTS DOCUMENT: HCPCS | Performed by: NURSE PRACTITIONER

## 2023-05-08 ENCOUNTER — TELEPHONE (OUTPATIENT)
Dept: PRIMARY CARE CLINIC | Facility: CLINIC | Age: 73
End: 2023-05-08

## 2023-05-08 NOTE — TELEPHONE ENCOUNTER
Patient would like to discuss starting a new script for anxiety.  Has taken Paxil and Sandy in the past. Wanted to know if Felipa Olivares could do a 30-day script as a trial.    Patient has been scheduled for 5/12 at 10:30 am.

## 2023-05-31 RX ORDER — TRAZODONE HYDROCHLORIDE 150 MG/1
TABLET ORAL
Qty: 90 TABLET | Refills: 3 | Status: SHIPPED | OUTPATIENT
Start: 2023-05-31

## 2023-07-10 ENCOUNTER — TELEMEDICINE (OUTPATIENT)
Dept: PRIMARY CARE CLINIC | Facility: CLINIC | Age: 73
End: 2023-07-10
Payer: MEDICARE

## 2023-07-10 ENCOUNTER — PATIENT MESSAGE (OUTPATIENT)
Dept: PRIMARY CARE CLINIC | Facility: CLINIC | Age: 73
End: 2023-07-10

## 2023-07-10 DIAGNOSIS — J20.9 BRONCHITIS WITH BRONCHOSPASM: Primary | ICD-10-CM

## 2023-07-10 DIAGNOSIS — R50.9 FEVER AND CHILLS: ICD-10-CM

## 2023-07-10 PROCEDURE — G8399 PT W/DXA RESULTS DOCUMENT: HCPCS | Performed by: INTERNAL MEDICINE

## 2023-07-10 PROCEDURE — G8420 CALC BMI NORM PARAMETERS: HCPCS | Performed by: INTERNAL MEDICINE

## 2023-07-10 PROCEDURE — 4004F PT TOBACCO SCREEN RCVD TLK: CPT | Performed by: INTERNAL MEDICINE

## 2023-07-10 PROCEDURE — 1123F ACP DISCUSS/DSCN MKR DOCD: CPT | Performed by: INTERNAL MEDICINE

## 2023-07-10 PROCEDURE — 1090F PRES/ABSN URINE INCON ASSESS: CPT | Performed by: INTERNAL MEDICINE

## 2023-07-10 PROCEDURE — 3017F COLORECTAL CA SCREEN DOC REV: CPT | Performed by: INTERNAL MEDICINE

## 2023-07-10 PROCEDURE — G8427 DOCREV CUR MEDS BY ELIG CLIN: HCPCS | Performed by: INTERNAL MEDICINE

## 2023-07-10 PROCEDURE — 99213 OFFICE O/P EST LOW 20 MIN: CPT | Performed by: INTERNAL MEDICINE

## 2023-07-10 RX ORDER — BENZONATATE 200 MG/1
200 CAPSULE ORAL 3 TIMES DAILY PRN
Qty: 21 CAPSULE | Refills: 0 | Status: SHIPPED | OUTPATIENT
Start: 2023-07-10 | End: 2023-07-17

## 2023-07-10 RX ORDER — AZITHROMYCIN 250 MG/1
250 TABLET, FILM COATED ORAL SEE ADMIN INSTRUCTIONS
Qty: 6 TABLET | Refills: 0 | Status: SHIPPED | OUTPATIENT
Start: 2023-07-10 | End: 2023-07-15

## 2023-07-10 RX ORDER — METHYLPREDNISOLONE 4 MG/1
TABLET ORAL
Qty: 1 KIT | Refills: 0 | Status: SHIPPED | OUTPATIENT
Start: 2023-07-10 | End: 2023-07-16

## 2023-07-10 ASSESSMENT — ENCOUNTER SYMPTOMS
SHORTNESS OF BREATH: 0
COLOR CHANGE: 0
BACK PAIN: 0
ABDOMINAL PAIN: 0
CONSTIPATION: 0
SORE THROAT: 1
DIARRHEA: 0
RHINORRHEA: 0
EYE DISCHARGE: 0
COUGH: 1
CHEST TIGHTNESS: 0

## 2023-07-10 NOTE — PROGRESS NOTES
Reji Gonzalez (: 1950) is a 72 y.o. female, established patient, here for evaluation of the following chief complaint(s):   Cough    ASSESSMENT/PLAN:  Below is the assessment and plan developed based on review of pertinent history, labs, studies, and medications. 1. Bronchitis with bronchospasm  -     azithromycin (ZITHROMAX) 250 MG tablet; Take 1 tablet by mouth See Admin Instructions for 5 days 500mg on day 1 followed by 250mg on days 2 - 5, Disp-6 tablet, R-0Normal sent to pharmacy. -     methylPREDNISolone (MEDROL DOSEPACK) 4 MG tablet; Take by mouth., Disp-1 kit, R-0Normal sent to pharmacy. -     benzonatate (TESSALON) 200 MG capsule; Take 1 capsule by mouth 3 times daily as needed for Cough, Disp-21 capsule, R-0Normal sent to pharmacy. I prescribed Zithromax 250 mg to start taking 500 mg on day 1 followed by 250 mg on days 2-5 and Tessalon 200 mg to start taking TID PRN for cough. I want the patient to take the medrol dosepack 4 mg as follows: 3 pills x 1 day, 2 pills x 2 days, 1 pill x 2 days and 1/2 pill x 1 day. The patient was advised to take this medication with food. Potential side effects were discussed. 2. Fever and chills  I advised her to take Tylenol to relieve her fever. SUBJECTIVE/OBJECTIVE:  HPI  The patient presents today for cough and fever. She has been experiencing a sore throat, head congestion, and productive coughing since Thursday evening. Her  experienced symptoms around the same time as well. She had taken 2 COVID tests which were both negative but they are . She has a fever of 101.7 right now. She denies chest tightness or wheezing. Review of Systems   Constitutional:  Positive for fever. Negative for activity change, fatigue and unexpected weight change. HENT:  Positive for congestion and sore throat. Negative for hearing loss and rhinorrhea. Eyes:  Negative for discharge. Respiratory:  Positive for cough.  Negative for chest

## 2023-07-10 NOTE — TELEPHONE ENCOUNTER
From: Elfego Alonzo  To: Dr. Angel Rivera: 7/10/2023 6:53 AM EDT  Subject: covid testing? Hi Dr. Lashawn Guo,  Since Thursday evening I've had a sore throat and a cough and head congestion that has been getting worse. I've had all the covid vaccines and boosters. Do you think I should get tested for covid, and if so, where do you recommend? I've taken 2 at-home tests and they were negative, but the date was . I've never had to get tested before. In the past, I would think I just have a bad cold.     Thanks,  Avtar Villa

## 2023-09-20 NOTE — TELEPHONE ENCOUNTER
Requested Prescriptions     Signed Prescriptions Disp Refills    metoprolol tartrate (LOPRESSOR) 25 MG tablet 180 tablet 3     Sig: TAKE 1 TABLET TWICE A DAY     Authorizing Provider: Makayla Koroma     Ordering User: Radha Kamara     Verbal order per Chana Paz NP.     Future Appointments   Date Time Provider 4600  [address]   4/4/2024  3:20 PM MD MALIK Rasmussen AMB

## 2023-11-13 DIAGNOSIS — I10 ESSENTIAL HYPERTENSION: Primary | ICD-10-CM

## 2023-11-13 DIAGNOSIS — E78.5 HYPERLIPIDEMIA, UNSPECIFIED HYPERLIPIDEMIA TYPE: ICD-10-CM

## 2023-11-13 RX ORDER — ATORVASTATIN CALCIUM 10 MG/1
TABLET, FILM COATED ORAL
Qty: 90 TABLET | Refills: 0 | Status: SHIPPED | OUTPATIENT
Start: 2023-11-13

## 2023-11-19 SDOH — HEALTH STABILITY: PHYSICAL HEALTH: ON AVERAGE, HOW MANY MINUTES DO YOU ENGAGE IN EXERCISE AT THIS LEVEL?: 40 MIN

## 2023-11-19 SDOH — ECONOMIC STABILITY: FOOD INSECURITY: WITHIN THE PAST 12 MONTHS, YOU WORRIED THAT YOUR FOOD WOULD RUN OUT BEFORE YOU GOT MONEY TO BUY MORE.: NEVER TRUE

## 2023-11-19 SDOH — ECONOMIC STABILITY: TRANSPORTATION INSECURITY
IN THE PAST 12 MONTHS, HAS LACK OF TRANSPORTATION KEPT YOU FROM MEETINGS, WORK, OR FROM GETTING THINGS NEEDED FOR DAILY LIVING?: NO

## 2023-11-19 SDOH — ECONOMIC STABILITY: HOUSING INSECURITY
IN THE LAST 12 MONTHS, WAS THERE A TIME WHEN YOU DID NOT HAVE A STEADY PLACE TO SLEEP OR SLEPT IN A SHELTER (INCLUDING NOW)?: NO

## 2023-11-19 SDOH — ECONOMIC STABILITY: INCOME INSECURITY: HOW HARD IS IT FOR YOU TO PAY FOR THE VERY BASICS LIKE FOOD, HOUSING, MEDICAL CARE, AND HEATING?: NOT HARD AT ALL

## 2023-11-19 SDOH — HEALTH STABILITY: PHYSICAL HEALTH: ON AVERAGE, HOW MANY DAYS PER WEEK DO YOU ENGAGE IN MODERATE TO STRENUOUS EXERCISE (LIKE A BRISK WALK)?: 5 DAYS

## 2023-11-19 SDOH — ECONOMIC STABILITY: FOOD INSECURITY: WITHIN THE PAST 12 MONTHS, THE FOOD YOU BOUGHT JUST DIDN'T LAST AND YOU DIDN'T HAVE MONEY TO GET MORE.: NEVER TRUE

## 2023-11-19 ASSESSMENT — PATIENT HEALTH QUESTIONNAIRE - PHQ9
SUM OF ALL RESPONSES TO PHQ9 QUESTIONS 1 & 2: 0
SUM OF ALL RESPONSES TO PHQ QUESTIONS 1-9: 0
2. FEELING DOWN, DEPRESSED OR HOPELESS: 0
1. LITTLE INTEREST OR PLEASURE IN DOING THINGS: 0
SUM OF ALL RESPONSES TO PHQ QUESTIONS 1-9: 0

## 2023-11-19 ASSESSMENT — LIFESTYLE VARIABLES
HOW OFTEN DO YOU HAVE A DRINK CONTAINING ALCOHOL: 3
HOW MANY STANDARD DRINKS CONTAINING ALCOHOL DO YOU HAVE ON A TYPICAL DAY: 1 OR 2
HOW MANY STANDARD DRINKS CONTAINING ALCOHOL DO YOU HAVE ON A TYPICAL DAY: 1
HOW OFTEN DO YOU HAVE A DRINK CONTAINING ALCOHOL: 2-4 TIMES A MONTH
HOW OFTEN DO YOU HAVE SIX OR MORE DRINKS ON ONE OCCASION: 1

## 2023-11-21 ENCOUNTER — OFFICE VISIT (OUTPATIENT)
Dept: PRIMARY CARE CLINIC | Facility: CLINIC | Age: 73
End: 2023-11-21
Payer: MEDICARE

## 2023-11-21 VITALS
DIASTOLIC BLOOD PRESSURE: 69 MMHG | SYSTOLIC BLOOD PRESSURE: 109 MMHG | HEIGHT: 62 IN | WEIGHT: 127 LBS | HEART RATE: 58 BPM | RESPIRATION RATE: 16 BRPM | TEMPERATURE: 97.1 F | OXYGEN SATURATION: 98 % | BODY MASS INDEX: 23.37 KG/M2

## 2023-11-21 DIAGNOSIS — E78.2 MIXED HYPERLIPIDEMIA: ICD-10-CM

## 2023-11-21 DIAGNOSIS — F51.01 PRIMARY INSOMNIA: ICD-10-CM

## 2023-11-21 DIAGNOSIS — M85.89 OSTEOPENIA OF MULTIPLE SITES: ICD-10-CM

## 2023-11-21 DIAGNOSIS — M21.612 BUNION, LEFT FOOT: ICD-10-CM

## 2023-11-21 DIAGNOSIS — Z00.00 MEDICARE ANNUAL WELLNESS VISIT, SUBSEQUENT: Primary | ICD-10-CM

## 2023-11-21 DIAGNOSIS — I10 ESSENTIAL HYPERTENSION: ICD-10-CM

## 2023-11-21 DIAGNOSIS — Z23 NEED FOR VACCINATION AGAINST STREPTOCOCCUS PNEUMONIAE: ICD-10-CM

## 2023-11-21 DIAGNOSIS — I47.10 PSVT (PAROXYSMAL SUPRAVENTRICULAR TACHYCARDIA): ICD-10-CM

## 2023-11-21 DIAGNOSIS — G89.29 CHRONIC RIGHT SHOULDER PAIN: ICD-10-CM

## 2023-11-21 DIAGNOSIS — Z71.89 ACP (ADVANCE CARE PLANNING): ICD-10-CM

## 2023-11-21 DIAGNOSIS — M25.511 CHRONIC RIGHT SHOULDER PAIN: ICD-10-CM

## 2023-11-21 LAB
ALBUMIN SERPL-MCNC: 4.3 G/DL (ref 3.5–5)
ALBUMIN/GLOB SERPL: 1.3 (ref 1.1–2.2)
ALP SERPL-CCNC: 66 U/L (ref 45–117)
ALT SERPL-CCNC: 25 U/L (ref 12–78)
ANION GAP SERPL CALC-SCNC: 3 MMOL/L (ref 5–15)
AST SERPL-CCNC: 28 U/L (ref 15–37)
BILIRUB SERPL-MCNC: 1.5 MG/DL (ref 0.2–1)
BUN SERPL-MCNC: 12 MG/DL (ref 6–20)
BUN/CREAT SERPL: 16 (ref 12–20)
CALCIUM SERPL-MCNC: 9.3 MG/DL (ref 8.5–10.1)
CHLORIDE SERPL-SCNC: 105 MMOL/L (ref 97–108)
CHOLEST SERPL-MCNC: 149 MG/DL
CO2 SERPL-SCNC: 28 MMOL/L (ref 21–32)
CREAT SERPL-MCNC: 0.77 MG/DL (ref 0.55–1.02)
ERYTHROCYTE [DISTWIDTH] IN BLOOD BY AUTOMATED COUNT: 11.7 % (ref 11.5–14.5)
GLOBULIN SER CALC-MCNC: 3.3 G/DL (ref 2–4)
GLUCOSE SERPL-MCNC: 107 MG/DL (ref 65–100)
HCT VFR BLD AUTO: 41.4 % (ref 35–47)
HDLC SERPL-MCNC: 57 MG/DL
HDLC SERPL: 2.6 (ref 0–5)
HGB BLD-MCNC: 14.1 G/DL (ref 11.5–16)
LDLC SERPL CALC-MCNC: 80 MG/DL (ref 0–100)
MCH RBC QN AUTO: 31.1 PG (ref 26–34)
MCHC RBC AUTO-ENTMCNC: 34.1 G/DL (ref 30–36.5)
MCV RBC AUTO: 91.4 FL (ref 80–99)
NRBC # BLD: 0 K/UL (ref 0–0.01)
NRBC BLD-RTO: 0 PER 100 WBC
PLATELET # BLD AUTO: 195 K/UL (ref 150–400)
PMV BLD AUTO: 11.3 FL (ref 8.9–12.9)
POTASSIUM SERPL-SCNC: 4.2 MMOL/L (ref 3.5–5.1)
PROT SERPL-MCNC: 7.6 G/DL (ref 6.4–8.2)
RBC # BLD AUTO: 4.53 M/UL (ref 3.8–5.2)
SODIUM SERPL-SCNC: 136 MMOL/L (ref 136–145)
TRIGL SERPL-MCNC: 60 MG/DL
VLDLC SERPL CALC-MCNC: 12 MG/DL
WBC # BLD AUTO: 4.2 K/UL (ref 3.6–11)

## 2023-11-21 PROCEDURE — 3074F SYST BP LT 130 MM HG: CPT | Performed by: INTERNAL MEDICINE

## 2023-11-21 PROCEDURE — 99214 OFFICE O/P EST MOD 30 MIN: CPT | Performed by: INTERNAL MEDICINE

## 2023-11-21 PROCEDURE — G8420 CALC BMI NORM PARAMETERS: HCPCS | Performed by: INTERNAL MEDICINE

## 2023-11-21 PROCEDURE — 1036F TOBACCO NON-USER: CPT | Performed by: INTERNAL MEDICINE

## 2023-11-21 PROCEDURE — G8484 FLU IMMUNIZE NO ADMIN: HCPCS | Performed by: INTERNAL MEDICINE

## 2023-11-21 PROCEDURE — G8399 PT W/DXA RESULTS DOCUMENT: HCPCS | Performed by: INTERNAL MEDICINE

## 2023-11-21 PROCEDURE — 3017F COLORECTAL CA SCREEN DOC REV: CPT | Performed by: INTERNAL MEDICINE

## 2023-11-21 PROCEDURE — 1090F PRES/ABSN URINE INCON ASSESS: CPT | Performed by: INTERNAL MEDICINE

## 2023-11-21 PROCEDURE — G0439 PPPS, SUBSEQ VISIT: HCPCS | Performed by: INTERNAL MEDICINE

## 2023-11-21 PROCEDURE — G8427 DOCREV CUR MEDS BY ELIG CLIN: HCPCS | Performed by: INTERNAL MEDICINE

## 2023-11-21 PROCEDURE — 3078F DIAST BP <80 MM HG: CPT | Performed by: INTERNAL MEDICINE

## 2023-11-21 PROCEDURE — 1123F ACP DISCUSS/DSCN MKR DOCD: CPT | Performed by: INTERNAL MEDICINE

## 2023-11-21 RX ORDER — PHENOL 1.4 %
1 AEROSOL, SPRAY (ML) MUCOUS MEMBRANE DAILY
COMMUNITY

## 2023-11-21 RX ORDER — MELOXICAM 15 MG/1
15 TABLET ORAL DAILY
Qty: 90 TABLET | Refills: 0 | Status: SHIPPED | OUTPATIENT
Start: 2023-11-21 | End: 2024-02-19

## 2023-11-21 ASSESSMENT — ENCOUNTER SYMPTOMS
COUGH: 0
EYE DISCHARGE: 0
SORE THROAT: 0
CHEST TIGHTNESS: 0
DIARRHEA: 0
COLOR CHANGE: 0
SHORTNESS OF BREATH: 0
RHINORRHEA: 0
CONSTIPATION: 0
BACK PAIN: 0
ABDOMINAL PAIN: 0

## 2023-11-21 NOTE — PROGRESS NOTES
Naomy Sunshine (:  1950) is a 72 y.o. female, Established patient, here for evaluation of the following chief complaint(s):  Medicare AWV         ASSESSMENT/PLAN:  1. Essential hypertension  -     Comprehensive Metabolic Panel; Future  I ordered a CMP. I recommend that she continue taking metoprolol 25 mg BID. She is followed by Cardiology. 2. PSVT (paroxysmal supraventricular tachycardia)  I recommend that she continue taking metoprolol 25 mg BID. She is followed by Cardiology. 3. Primary insomnia  I recommend that she continue taking trazodone 150 mg nightly. 4. Mixed hyperlipidemia  -     CBC; Future  -     Lipid Panel; Future  I ordered a CBC and a lipid panel. I recommend that she continue taking atorvastatin 10 mg daily. 5. Osteopenia of multiple sites  I recommend that she continue taking Fosamax 70 mg weekly. 6. Bunion, left foot  -     meloxicam (MOBIC) 15 MG tablet; Take 1 tablet by mouth daily, Disp-90 tablet, R-0Normal sent to pharmacy. I prescribed meloxicam.  Potential side effects were discussed. 7. Chronic right shoulder pain  -     meloxicam (MOBIC) 15 MG tablet; Take 1 tablet by mouth daily, Disp-90 tablet, R-0Normal sent to pharmacy. I prescribed meloxicam.  Potential side effects were discussed. If she has restricted range of motion, she should contact the office, and I will refer her to Orthopedics. Subjective   SUBJECTIVE/OBJECTIVE:  HPI    Patient presents today for a follow up for chronic conditions and a MWV. She is fasting today. She reports that she has been feeling well. She was sick in July with a cough, fever and chills. She denies hair loss. She has had a bunion on her foot. She notes that she was walking and it got inflamed and it occasionally aches. She states that she has soreness in her right arm and shoulder after starting an exercise program.  She has had rotator cuff surgery in the .   She has had PT and notes that

## 2023-12-12 DIAGNOSIS — M85.89 OSTEOPENIA OF MULTIPLE SITES: Primary | ICD-10-CM

## 2023-12-12 RX ORDER — ALENDRONATE SODIUM 70 MG/1
70 TABLET ORAL
Qty: 12 TABLET | Refills: 3 | Status: SHIPPED | OUTPATIENT
Start: 2023-12-12

## 2023-12-27 ENCOUNTER — TELEPHONE (OUTPATIENT)
Dept: PRIMARY CARE CLINIC | Facility: CLINIC | Age: 73
End: 2023-12-27

## 2023-12-28 DIAGNOSIS — M79.622 PAIN IN LEFT UPPER ARM: ICD-10-CM

## 2023-12-28 DIAGNOSIS — G89.29 CHRONIC RIGHT SHOULDER PAIN: Primary | ICD-10-CM

## 2023-12-28 DIAGNOSIS — M25.511 CHRONIC RIGHT SHOULDER PAIN: Primary | ICD-10-CM

## 2023-12-28 DIAGNOSIS — M21.612 BUNION, LEFT FOOT: ICD-10-CM

## 2023-12-28 NOTE — TELEPHONE ENCOUNTER
Called pt then after one ring, I hung up because pt sent a [employer] message. [employer] message sent to pt.

## 2024-01-18 ENCOUNTER — HOSPITAL ENCOUNTER (OUTPATIENT)
Facility: HOSPITAL | Age: 74
Setting detail: RECURRING SERIES
Discharge: HOME | End: 2024-01-21
Payer: MEDICARE

## 2024-01-18 PROCEDURE — 97161 PT EVAL LOW COMPLEX 20 MIN: CPT

## 2024-01-18 PROCEDURE — 97112 NEUROMUSCULAR REEDUCATION: CPT

## 2024-01-18 PROCEDURE — 97110 THERAPEUTIC EXERCISES: CPT

## 2024-01-18 NOTE — THERAPY EVALUATION
Diagnosis, prognosis, self care, activity modification, and exercises   [x]  Plan of care has been reviewed with PTA      Certification Period: 1/18/24-4/18/24      Francis Samuel, PT       1/18/2024       12:33 PM        ===================================================================  I certify that the above Therapy Services are being furnished while the patient is under my care. I agree with the treatment plan and certify that this therapy is necessary.    Physician's Signature:_________________________   DATE:_________   TIME:________                           Kenyetta Arredondo MD    ** Signature, Date and Time must be completed for valid certification **  Please sign and fax to [phone].  Thank you

## 2024-01-26 ENCOUNTER — HOSPITAL ENCOUNTER (OUTPATIENT)
Facility: HOSPITAL | Age: 74
Setting detail: RECURRING SERIES
Discharge: HOME | End: 2024-01-29
Payer: MEDICARE

## 2024-01-26 PROCEDURE — 97110 THERAPEUTIC EXERCISES: CPT

## 2024-01-26 PROCEDURE — 97140 MANUAL THERAPY 1/> REGIONS: CPT

## 2024-01-26 NOTE — PROGRESS NOTES
PHYSICAL THERAPY - MEDICARE DAILY TREATMENT NOTE (updated 3/23)      Date: 2024          Patient Name:  Priya Orlando :  1950   Medical   Diagnosis:  Right shoulder pain [M25.511] Treatment Diagnosis:  M25.511  RIGHT SHOULDER PAIN    Referral Source:  Kenyetta Arredondo MD Insurance:   Payor: MEDICARE / Plan: MEDICARE PART A AND B / Product Type: *No Product type* /                     Patient  verified yes     Visit #   Current  / Total 2 MN   Time   In / Out 11:05 am 11:55 am   Total Treatment Time 50   Total Timed Codes 40   1:1 Treatment Time 30      MC BC Totals Reminder:  bill using total billable   min of TIMED therapeutic procedures and modalities.   8-22 min = 1 unit; 23-37 min = 2 units; 38-52 min = 3 units; 53-67 min = 4 units; 68-82 min = 5 units            SUBJECTIVE    Pain Level (0-10 scale): 2-3    Any medication changes, allergies to medications, adverse drug reactions, diagnosis change, or new procedure performed?: [x] No    [] Yes (see summary sheet for update)  Medications: Verified on Patient Summary List    Subjective functional status/changes:     Pt states she has experienced more soreness over the past nights.    OBJECTIVE      Therapeutic Procedures:  Tx Min Billable or 1:1 Min (if diff from Tx Min) Procedure, Rationale, Specifics   25 10 [id number] Therapeutic Exercise (timed):  increase ROM, strength, coordination, balance, and proprioception to improve patient's ability to progress to PLOF and address remaining functional goals. (see flow sheet as applicable)     Details if applicable:     15 88 [id number] Manual Therapy (timed):  decrease pain, increase ROM, and increase tissue extensibility to improve patient's ability to progress to PLOF and address remaining functional goals.  The manual therapy interventions were performed at a separate and distinct time from the therapeutic activities interventions . (see flow sheet as applicable)     Details if applicable:  passive shoulder

## 2024-01-30 ENCOUNTER — HOSPITAL ENCOUNTER (OUTPATIENT)
Facility: HOSPITAL | Age: 74
Setting detail: RECURRING SERIES
Discharge: HOME | End: 2024-02-02
Payer: MEDICARE

## 2024-01-30 PROCEDURE — 97110 THERAPEUTIC EXERCISES: CPT

## 2024-01-30 PROCEDURE — 97140 MANUAL THERAPY 1/> REGIONS: CPT

## 2024-01-30 NOTE — PROGRESS NOTES
Note/Recertification    NT      PLAN  Yes  Continue plan of care  Re-Cert Due: 4/18/24  []  Upgrade activities as tolerated  []  Discharge due to :  []  Other:      Francis Samuel, PT       1/30/2024       10:09 AM

## 2024-02-01 ENCOUNTER — HOSPITAL ENCOUNTER (OUTPATIENT)
Facility: HOSPITAL | Age: 74
Setting detail: RECURRING SERIES
Discharge: HOME | End: 2024-02-04
Payer: MEDICARE

## 2024-02-01 PROCEDURE — 97110 THERAPEUTIC EXERCISES: CPT

## 2024-02-01 PROCEDURE — 97140 MANUAL THERAPY 1/> REGIONS: CPT

## 2024-02-01 NOTE — PROGRESS NOTES
PHYSICAL THERAPY - MEDICARE DAILY TREATMENT NOTE (updated 3/23)      Date: 2024          Patient Name:  Priya Orlando :  1950   Medical   Diagnosis:  Right shoulder pain [M25.511] Treatment Diagnosis:  M25.511  RIGHT SHOULDER PAIN    Referral Source:  Kenyetta Arredondo MD Insurance:   Payor: MEDICARE / Plan: MEDICARE PART A AND B / Product Type: *No Product type* /                     Patient  verified yes     Visit #   Current  / Total 4 MN   Time   In / Out 10:00 am 10:50 am   Total Treatment Time 50   Total Timed Codes 40   1:1 Treatment Time 40      MC BC Totals Reminder:  bill using total billable   min of TIMED therapeutic procedures and modalities.   8-22 min = 1 unit; 23-37 min = 2 units; 38-52 min = 3 units; 53-67 min = 4 units; 68-82 min = 5 units            SUBJECTIVE    Pain Level (0-10 scale): 3    Any medication changes, allergies to medications, adverse drug reactions, diagnosis change, or new procedure performed?: [x] No    [] Yes (see summary sheet for update)  Medications: Verified on Patient Summary List    Subjective functional status/changes:     Pt states she moved her arm out to the side this morning while making the bed and felt a twinge of pain. Pt is a little sore from this.    OBJECTIVE      Therapeutic Procedures:  Tx Min Billable or 1:1 Min (if diff from Tx Min) Procedure, Rationale, Specifics   25  [id number] Therapeutic Exercise (timed):  increase ROM, strength, coordination, balance, and proprioception to improve patient's ability to progress to PLOF and address remaining functional goals. (see flow sheet as applicable)     Details if applicable:     15  63347 Manual Therapy (timed):  decrease pain, increase ROM, and increase tissue extensibility to improve patient's ability to progress to PLOF and address remaining functional goals.  The manual therapy interventions were performed at a separate and distinct time from the therapeutic activities interventions . (see flow

## 2024-02-06 ENCOUNTER — HOSPITAL ENCOUNTER (OUTPATIENT)
Facility: HOSPITAL | Age: 74
Setting detail: RECURRING SERIES
Discharge: HOME | End: 2024-02-09
Payer: MEDICARE

## 2024-02-06 PROCEDURE — 97110 THERAPEUTIC EXERCISES: CPT

## 2024-02-06 PROCEDURE — 97140 MANUAL THERAPY 1/> REGIONS: CPT

## 2024-02-06 NOTE — PROGRESS NOTES
PHYSICAL THERAPY - MEDICARE DAILY TREATMENT NOTE (updated 3/23)      Date: 2024          Patient Name:  Priya Orlando :  1950   Medical   Diagnosis:  Right shoulder pain [M25.511] Treatment Diagnosis:  M25.511  RIGHT SHOULDER PAIN    Referral Source:  Kenyetta Arredondo MD Insurance:   Payor: MEDICARE / Plan: MEDICARE PART A AND B / Product Type: *No Product type* /                     Patient  verified yes     Visit #   Current  / Total 5 MN   Time   In / Out 8:30 am 9:25 am   Total Treatment Time 55   Total Timed Codes 45   1:1 Treatment Time 35      MC BC Totals Reminder:  bill using total billable   min of TIMED therapeutic procedures and modalities.   8-22 min = 1 unit; 23-37 min = 2 units; 38-52 min = 3 units; 53-67 min = 4 units; 68-82 min = 5 units            SUBJECTIVE    Pain Level (0-10 scale): 1    Any medication changes, allergies to medications, adverse drug reactions, diagnosis change, or new procedure performed?: [x] No    [] Yes (see summary sheet for update)  Medications: Verified on Patient Summary List    Subjective functional status/changes:     Pt states she is doing better and notices more motion in her shoulder. Pt feels like her shoulder moves more freely.    OBJECTIVE      Therapeutic Procedures:  Tx Min Billable or 1:1 Min (if diff from Tx Min) Procedure, Rationale, Specifics   30 20 [id number] Therapeutic Exercise (timed):  increase ROM, strength, coordination, balance, and proprioception to improve patient's ability to progress to PLOF and address remaining functional goals. (see flow sheet as applicable)     Details if applicable:     15 15 15644 Manual Therapy (timed):  decrease pain, increase ROM, and increase tissue extensibility to improve patient's ability to progress to PLOF and address remaining functional goals.  The manual therapy interventions were performed at a separate and distinct time from the therapeutic activities interventions . (see flow sheet as applicable)

## 2024-02-08 ENCOUNTER — HOSPITAL ENCOUNTER (OUTPATIENT)
Facility: HOSPITAL | Age: 74
Setting detail: RECURRING SERIES
Discharge: HOME | End: 2024-02-11
Payer: MEDICARE

## 2024-02-08 PROCEDURE — 97140 MANUAL THERAPY 1/> REGIONS: CPT

## 2024-02-08 PROCEDURE — 97110 THERAPEUTIC EXERCISES: CPT

## 2024-02-08 NOTE — PROGRESS NOTES
PHYSICAL THERAPY - MEDICARE DAILY TREATMENT NOTE (updated 3/23)      Date: 2024          Patient Name:  Priya Orlando :  1950   Medical   Diagnosis:  Right shoulder pain [M25.511] Treatment Diagnosis:  M25.511  RIGHT SHOULDER PAIN    Referral Source:  Kenyetta Arredondo MD Insurance:   Payor: MEDICARE / Plan: MEDICARE PART A AND B / Product Type: *No Product type* /                     Patient  verified yes     Visit #   Current  / Total 6 MN   Time   In / Out 2:00 pm 2:55 pm   Total Treatment Time 55   Total Timed Codes 45   1:1 Treatment Time 30      MC BC Totals Reminder:  bill using total billable   min of TIMED therapeutic procedures and modalities.   8-22 min = 1 unit; 23-37 min = 2 units; 38-52 min = 3 units; 53-67 min = 4 units; 68-82 min = 5 units            SUBJECTIVE    Pain Level (0-10 scale): 1    Any medication changes, allergies to medications, adverse drug reactions, diagnosis change, or new procedure performed?: [x] No    [] Yes (see summary sheet for update)  Medications: Verified on Patient Summary List    Subjective functional status/changes:     Pt states she is doing pretty well and not having as much pain.    OBJECTIVE      Therapeutic Procedures:  Tx Min Billable or 1:1 Min (if diff from Tx Min) Procedure, Rationale, Specifics   30 15 32843 Therapeutic Exercise (timed):  increase ROM, strength, coordination, balance, and proprioception to improve patient's ability to progress to PLOF and address remaining functional goals. (see flow sheet as applicable)     Details if applicable:     15 15 82354 Manual Therapy (timed):  decrease pain, increase ROM, and increase tissue extensibility to improve patient's ability to progress to PLOF and address remaining functional goals.  The manual therapy interventions were performed at a separate and distinct time from the therapeutic activities interventions . (see flow sheet as applicable)     Details if applicable:  passive shoulder

## 2024-02-12 DIAGNOSIS — E78.5 HYPERLIPIDEMIA, UNSPECIFIED HYPERLIPIDEMIA TYPE: ICD-10-CM

## 2024-02-12 RX ORDER — ATORVASTATIN CALCIUM 10 MG/1
TABLET, FILM COATED ORAL
Qty: 90 TABLET | Refills: 1 | Status: SHIPPED | OUTPATIENT
Start: 2024-02-12

## 2024-02-13 ENCOUNTER — HOSPITAL ENCOUNTER (OUTPATIENT)
Facility: HOSPITAL | Age: 74
Setting detail: RECURRING SERIES
Discharge: HOME | End: 2024-02-16
Payer: MEDICARE

## 2024-02-13 PROCEDURE — 97110 THERAPEUTIC EXERCISES: CPT

## 2024-02-13 PROCEDURE — 97140 MANUAL THERAPY 1/> REGIONS: CPT

## 2024-02-13 NOTE — PROGRESS NOTES
PHYSICAL THERAPY - MEDICARE DAILY TREATMENT NOTE (updated 3/23)      Date: 2024          Patient Name:  Priya Orlando :  1950   Medical   Diagnosis:  Right shoulder pain [M25.511] Treatment Diagnosis:  M25.511  RIGHT SHOULDER PAIN    Referral Source:  Kenyetta Arredondo MD Insurance:   Payor: MEDICARE / Plan: MEDICARE PART A AND B / Product Type: *No Product type* /                     Patient  verified yes     Visit #   Current  / Total 7 MN   Time   In / Out 10:00 am 10:50 am   Total Treatment Time 50   Total Timed Codes 40   1:1 Treatment Time 40      [facility] Totals Reminder:  bill using total billable   min of TIMED therapeutic procedures and modalities.   8-22 min = 1 unit; 23-37 min = 2 units; 38-52 min = 3 units; 53-67 min = 4 units; 68-82 min = 5 units            SUBJECTIVE    Pain Level (0-10 scale): 0    Any medication changes, allergies to medications, adverse drug reactions, diagnosis change, or new procedure performed?: [x] No    [] Yes (see summary sheet for update)  Medications: Verified on Patient Summary List    Subjective functional status/changes:     Pt states she is doing well and was not sore after last session.    OBJECTIVE      Therapeutic Procedures:  Tx Min Billable or 1:1 Min (if diff from Tx Min) Procedure, Rationale, Specifics   30  14014 Therapeutic Exercise (timed):  increase ROM, strength, coordination, balance, and proprioception to improve patient's ability to progress to PLOF and address remaining functional goals. (see flow sheet as applicable)     Details if applicable:     10  27230 Manual Therapy (timed):  decrease pain, increase ROM, and increase tissue extensibility to improve patient's ability to progress to PLOF and address remaining functional goals.  The manual therapy interventions were performed at a separate and distinct time from the therapeutic activities interventions . (see flow sheet as applicable)     Details if applicable:  passive shoulder

## 2024-02-14 DIAGNOSIS — G89.29 CHRONIC RIGHT SHOULDER PAIN: ICD-10-CM

## 2024-02-14 DIAGNOSIS — M21.612 BUNION, LEFT FOOT: ICD-10-CM

## 2024-02-14 DIAGNOSIS — M25.511 CHRONIC RIGHT SHOULDER PAIN: ICD-10-CM

## 2024-02-14 RX ORDER — MELOXICAM 15 MG/1
15 TABLET ORAL DAILY
Qty: 90 TABLET | Refills: 2 | Status: SHIPPED | OUTPATIENT
Start: 2024-02-14

## 2024-02-15 ENCOUNTER — HOSPITAL ENCOUNTER (OUTPATIENT)
Facility: HOSPITAL | Age: 74
Setting detail: RECURRING SERIES
Discharge: HOME | End: 2024-02-18
Payer: MEDICARE

## 2024-02-15 PROCEDURE — 97140 MANUAL THERAPY 1/> REGIONS: CPT

## 2024-02-15 PROCEDURE — 97110 THERAPEUTIC EXERCISES: CPT

## 2024-02-15 NOTE — PROGRESS NOTES
PHYSICAL THERAPY - MEDICARE DAILY TREATMENT NOTE (updated 3/23)      Date: 2/15/2024          Patient Name:  Priya Orlando :  1950   Medical   Diagnosis:  Right shoulder pain [M25.511] Treatment Diagnosis:  M25.511  RIGHT SHOULDER PAIN    Referral Source:  Kenyetta Arredondo MD Insurance:   Payor: MEDICARE / Plan: MEDICARE PART A AND B / Product Type: *No Product type* /                     Patient  verified yes     Visit #   Current  / Total 8 MN   Time   In / Out 10:00 am 10:50 am   Total Treatment Time 50   Total Timed Codes 40   1:1 Treatment Time 40      [facility] Totals Reminder:  bill using total billable   min of TIMED therapeutic procedures and modalities.   8-22 min = 1 unit; 23-37 min = 2 units; 38-52 min = 3 units; 53-67 min = 4 units; 68-82 min = 5 units            SUBJECTIVE    Pain Level (0-10 scale): 0    Any medication changes, allergies to medications, adverse drug reactions, diagnosis change, or new procedure performed?: [x] No    [] Yes (see summary sheet for update)  Medications: Verified on Patient Summary List    Subjective functional status/changes:     Pt states she feels good and not having much pain today.    OBJECTIVE      Therapeutic Procedures:  Tx Min Billable or 1:1 Min (if diff from Tx Min) Procedure, Rationale, Specifics   30  58849 Therapeutic Exercise (timed):  increase ROM, strength, coordination, balance, and proprioception to improve patient's ability to progress to PLOF and address remaining functional goals. (see flow sheet as applicable)     Details if applicable:     10  91312 Manual Therapy (timed):  decrease pain, increase ROM, and increase tissue extensibility to improve patient's ability to progress to PLOF and address remaining functional goals.  The manual therapy interventions were performed at a separate and distinct time from the therapeutic activities interventions . (see flow sheet as applicable)     Details if applicable:  passive shoulder abd/ER/flexion,

## 2024-02-16 NOTE — TELEPHONE ENCOUNTER
Patient was calling to get a referral to [employer] for her arm because she said it wasn't getting much better with Meloxicam.  Patient would like to know if this would be possible. [NL] : warm, clear

## 2024-02-20 ENCOUNTER — APPOINTMENT (OUTPATIENT)
Facility: HOSPITAL | Age: 74
End: 2024-02-20
Payer: MEDICARE

## 2024-02-22 ENCOUNTER — HOSPITAL ENCOUNTER (OUTPATIENT)
Facility: HOSPITAL | Age: 74
Setting detail: RECURRING SERIES
Discharge: HOME | End: 2024-02-25
Payer: MEDICARE

## 2024-02-22 PROCEDURE — 97110 THERAPEUTIC EXERCISES: CPT

## 2024-02-22 PROCEDURE — 97140 MANUAL THERAPY 1/> REGIONS: CPT

## 2024-02-22 NOTE — PROGRESS NOTES
Physical Therapy at [facility],   a part of [facility]  [address] [id number]  Phone: [phone]  Fax: [phone]     PHYSICAL THERAPY PROGRESS NOTE  Patient Name:  Priya Orlando :  1950   Treatment/Medical Diagnosis: Right shoulder pain [M25.511]   Referral Source:  Kenyetta Arredondo MD     Date of Initial Visit:  24 Attended Visits:  9 Missed Visits:  0     SUMMARY OF TREATMENT/ASSESSMENT:  The Pt has been seen for 9 outpatient physical therapy sessions with signs and symptoms of R RC tendonitis and impingement. The Pt's therapy program has focused on improving her shoulder A/PROM, improving her shoulder arthrokinematics, improving her postural strength and awareness, improving her rotator cuff and scapular strength and stability, and improving her activity tolerance and endurance via therapeutic exercises, manual therapy techniques, and pain relieving modalities. The Pt has progress well and has improving her R shoulder ROM and strength. She no longer complains of pain at rest and only has pain with specific activities such as lifting something heavy out in front of her. She is able to reach behind her back with less difficulty and pain. She was able to lift her 3yo grandson without pain, but is careful of her mechanics. Overall, she believes that she is 70% improved since beginning therapy.     CURRENT STATUS/GOALS:  R Shoulder AROM:  Flexion- 176, p!  Abduction- 170  ER- T3  IR- T6, p!     R Shoulder MMT:  Flexion- 4+/5  Abduction- 4/5, p!  ER- 4+/5  IR- 4/5, p!    Short Term Goals: To be accomplished in 8 treatments.  Pt will be I w/ HEP in order to take active role in recovery- MET  Pt will apply ice to shoulder at least 1 x day in order to reduce pain- NOT MET   Pt will avoid reaching behind back and overhead in order to reduce stress on shoulder- MET  Long Term Goals: To be accomplished in 24 treatments.  Pt will report over 10 point 
ability to progress to PLOF and address remaining functional goals. (see flow sheet as applicable)     Details if applicable:     10 10 77614 Manual Therapy (timed):  decrease pain, increase ROM, and increase tissue extensibility to improve patient's ability to progress to PLOF and address remaining functional goals.  The manual therapy interventions were performed at a separate and distinct time from the therapeutic activities interventions . (see flow sheet as applicable)     Details if applicable:  passive shoulder abd/ER/flexion, inferior mobs on GH joint grade 2-3, STM to infraspinatus   61 42    Total Total         Modalities Rationale:     decrease edema, decrease inflammation, and decrease pain to improve patient's ability to progress to PLOF and address remaining functional goals.       min [] Estim Unattended,             type/location:       []  w/ice    []  w/heat        min [] Estim Attended,             type/location:       []  w/ice   []  w/heat         []  w/US   []  TENS insruct            min []  Mechanical Traction,        type/lbs:        []  pro      []  sup           []  int       []  cont            []  before manual           []  after manual     min []  Ultrasound,         settings/location:     10 min  unbilled [x]  Ice     []  Heat            location/position: Supine, R shoulder        min []  Vasopneumatic Device,      press/temp:   pre-treatment girth :    post-treatment girth :    measured at (landmark       location) :   If using vaso (only need to measure limb vaso being performed on)        min []  Other:      Skin assessment post-treatment (if applicable):    [x]  intact    []  redness- no adverse reaction                 []redness - adverse reaction:          [x]  Patient Education billed concurrently with other procedures   [x] Review HEP    [] Progressed/Changed HEP, detail:    [] Other detail:         Other Objective/Functional Measures  R Shoulder AROM:  Flexion- 176,

## 2024-02-27 ENCOUNTER — HOSPITAL ENCOUNTER (OUTPATIENT)
Facility: HOSPITAL | Age: 74
Setting detail: RECURRING SERIES
Discharge: HOME | End: 2024-03-01
Payer: MEDICARE

## 2024-02-27 PROCEDURE — 97140 MANUAL THERAPY 1/> REGIONS: CPT

## 2024-02-27 PROCEDURE — 97110 THERAPEUTIC EXERCISES: CPT

## 2024-02-27 NOTE — PROGRESS NOTES
PHYSICAL THERAPY - MEDICARE DAILY TREATMENT NOTE (updated 3/23)      Date: 2024          Patient Name:  Priya Orlando :  1950   Medical   Diagnosis:  Right shoulder pain [M25.511] Treatment Diagnosis:  M25.511  RIGHT SHOULDER PAIN    Referral Source:  Kenyetta Arredondo MD Insurance:   Payor: MEDICARE / Plan: MEDICARE PART A AND B / Product Type: *No Product type* /                     Patient  verified yes     Visit #   Current  / Total 10 MN   Time   In / Out 9:30 am 10:30 AM   Total Treatment Time 60 minutes   Total Timed Codes 50 minutes   1:1 Treatment Time 40 minutes      [facility] Totals Reminder:  bill using total billable   min of TIMED therapeutic procedures and modalities.   8-22 min = 1 unit; 23-37 min = 2 units; 38-52 min = 3 units; 53-67 min = 4 units; 68-82 min = 5 units            SUBJECTIVE    Pain Level (0-10 scale): 0/10    Any medication changes, allergies to medications, adverse drug reactions, diagnosis change, or new procedure performed?: [x] No    [] Yes (see summary sheet for update)  Medications: Verified on Patient Summary List    Subjective functional status/changes:     The patient reports her shoulder is feeling much better, she was able to hold her 5 mo granddaughter without increased R shoulder pain.     OBJECTIVE      Therapeutic Procedures:  Tx Min Billable or 1:1 Min (if diff from Tx Min) Procedure, Rationale, Specifics   40 30 34919 Therapeutic Exercise (timed):  increase ROM, strength, coordination, balance, and proprioception to improve patient's ability to progress to PLOF and address remaining functional goals. (see flow sheet as applicable)     Details if applicable:     10 10 24595 Manual Therapy (timed):  decrease pain, increase ROM, and increase tissue extensibility to improve patient's ability to progress to PLOF and address remaining functional goals.  The manual therapy interventions were performed at a separate and distinct time from the therapeutic

## 2024-02-29 ENCOUNTER — HOSPITAL ENCOUNTER (OUTPATIENT)
Facility: HOSPITAL | Age: 74
Setting detail: RECURRING SERIES
End: 2024-02-29
Payer: MEDICARE

## 2024-02-29 PROCEDURE — 97110 THERAPEUTIC EXERCISES: CPT

## 2024-02-29 PROCEDURE — 97140 MANUAL THERAPY 1/> REGIONS: CPT

## 2024-02-29 NOTE — PROGRESS NOTES
PHYSICAL THERAPY - MEDICARE DAILY TREATMENT NOTE (updated 3/23)      Date: 2024          Patient Name:  Priya Orlando :  1950   Medical   Diagnosis:  Right shoulder pain [M25.511] Treatment Diagnosis:  M25.511  RIGHT SHOULDER PAIN    Referral Source:  Kenyetta Arredondo MD Insurance:   Payor: MEDICARE / Plan: MEDICARE PART A AND B / Product Type: *No Product type* /                     Patient  verified yes     Visit #   Current  / Total 10 MN   Time   In / Out 9:30 am 10:40 AM   Total Treatment Time 70 minutes   Total Timed Codes 60 minutes   1:1 Treatment Time 40 minutes      [facility] Totals Reminder:  bill using total billable   min of TIMED therapeutic procedures and modalities.   8-22 min = 1 unit; 23-37 min = 2 units; 38-52 min = 3 units; 53-67 min = 4 units; 68-82 min = 5 units            SUBJECTIVE    Pain Level (0-10 scale): 0/10    Any medication changes, allergies to medications, adverse drug reactions, diagnosis change, or new procedure performed?: [x] No    [] Yes (see summary sheet for update)  Medications: Verified on Patient Summary List    Subjective functional status/changes:     The patient reports \"My shoulder is doing really well, it almost feels normal!\"    OBJECTIVE  AROM b/l shoulder flexion and abduction WNL and pain free  Palpaion: Palpable Trigger point along infraspinatus on R and teres mm.     Therapeutic Procedures:  Tx Min Billable or 1:1 Min (if diff from Tx Min) Procedure, Rationale, Specifics   45 29 91062 Therapeutic Exercise (timed):  increase ROM, strength, coordination, balance, and proprioception to improve patient's ability to progress to PLOF and address remaining functional goals. (see flow sheet as applicable)     Details if applicable:     15 15 85716 Manual Therapy (timed):  decrease pain, increase ROM, and increase tissue extensibility to improve patient's ability to progress to PLOF and address remaining functional goals.  The manual therapy interventions

## 2024-03-06 ENCOUNTER — APPOINTMENT (OUTPATIENT)
Facility: HOSPITAL | Age: 74
End: 2024-03-06
Payer: MEDICARE

## 2024-03-12 ENCOUNTER — HOSPITAL ENCOUNTER (OUTPATIENT)
Facility: HOSPITAL | Age: 74
Setting detail: RECURRING SERIES
Discharge: HOME | End: 2024-03-15
Payer: MEDICARE

## 2024-03-12 PROCEDURE — 97110 THERAPEUTIC EXERCISES: CPT

## 2024-03-12 PROCEDURE — 97140 MANUAL THERAPY 1/> REGIONS: CPT

## 2024-03-12 NOTE — PROGRESS NOTES
last week due to being sick and felt she had a set back. Educated on slow return to PLOF and activity modifications to not aggravate her shoulder. Increased TTP along R infraspinatus and teres major/minor mm but PROM was WNL. Patient to schedule 2 additional visits next week then will d/c to HEP. Patient agreeable.  Patient will continue to benefit from skilled PT / OT services to modify and progress therapeutic interventions, analyze and address functional mobility deficits, analyze and address ROM deficits, analyze and address strength deficits, and analyze and address soft tissue restrictions to address functional deficits and attain remaining goals.    Progress toward goals / Updated goals:  [x]  See Progress Note/Recertification  NT    PLAN  Yes  Continue plan of care  Re-Cert Due: 4/18/24  [x]  Upgrade activities as tolerated  []  Discharge due to :  []  Other:      Deana Wilson, PTA       3/12/2024       9:44 AM

## 2024-03-14 ENCOUNTER — HOSPITAL ENCOUNTER (OUTPATIENT)
Facility: HOSPITAL | Age: 74
Setting detail: RECURRING SERIES
Discharge: HOME | End: 2024-03-17
Payer: MEDICARE

## 2024-03-14 PROCEDURE — 97140 MANUAL THERAPY 1/> REGIONS: CPT

## 2024-03-14 PROCEDURE — 97110 THERAPEUTIC EXERCISES: CPT

## 2024-03-14 NOTE — PROGRESS NOTES
PHYSICAL THERAPY - MEDICARE DAILY TREATMENT NOTE (updated 3/23)      Date: 3/14/2024          Patient Name:  Priya Orlando :  1950   Medical   Diagnosis:  Right shoulder pain [M25.511] Treatment Diagnosis:  M25.511  RIGHT SHOULDER PAIN    Referral Source:  Kenyetta Arredondo MD Insurance:   Payor: MEDICARE / Plan: MEDICARE PART A AND B / Product Type: *No Product type* /                     Patient  verified yes     Visit #   Current  / Total 13 MN   Time   In / Out 9:30 am 10:35 AM   Total Treatment Time 65 minutes   Total Timed Codes 55 minutes   1:1 Treatment Time 40 minutes      [facility] Totals Reminder:  bill using total billable   min of TIMED therapeutic procedures and modalities.   8-22 min = 1 unit; 23-37 min = 2 units; 38-52 min = 3 units; 53-67 min = 4 units; 68-82 min = 5 units            SUBJECTIVE    Pain Level (0-10 scale): 1-2/10    Any medication changes, allergies to medications, adverse drug reactions, diagnosis change, or new procedure performed?: [x] No    [] Yes (see summary sheet for update)  Medications: Verified on Patient Summary List    Subjective functional status/changes:     Patient reports that overall she is feeling better and that therapy is helping.  She still has pain with lifting I.e tea kettle on stove, and motions involving IR and extension.      OBJECTIVE      Therapeutic Procedures:  Tx Min Billable or 1:1 Min (if diff from Tx Min) Procedure, Rationale, Specifics   40 30 24964 Therapeutic Exercise (timed):  increase ROM, strength, coordination, balance, and proprioception to improve patient's ability to progress to PLOF and address remaining functional goals. (see flow sheet as applicable)     Details if applicable:     15 15 88278 Manual Therapy (timed):  decrease pain, increase ROM, and increase tissue extensibility to improve patient's ability to progress to PLOF and address remaining functional goals.  The manual therapy interventions were performed at a separate

## 2024-03-19 ENCOUNTER — HOSPITAL ENCOUNTER (OUTPATIENT)
Facility: HOSPITAL | Age: 74
Setting detail: RECURRING SERIES
Discharge: HOME | End: 2024-03-22
Payer: MEDICARE

## 2024-03-19 PROCEDURE — 97140 MANUAL THERAPY 1/> REGIONS: CPT

## 2024-03-19 PROCEDURE — 97110 THERAPEUTIC EXERCISES: CPT

## 2024-03-19 NOTE — PROGRESS NOTES
PHYSICAL THERAPY - MEDICARE DAILY TREATMENT NOTE (updated 3/23)      Date: 3/19/2024          Patient Name:  Priya Orlando :  1950   Medical   Diagnosis:  Right shoulder pain [M25.511] Treatment Diagnosis:  M25.511  RIGHT SHOULDER PAIN    Referral Source:  Kenyetta Arredondo MD Insurance:   Payor: MEDICARE / Plan: MEDICARE PART A AND B / Product Type: *No Product type* /                     Patient  verified yes     Visit #   Current  / Total 14 MN   Time   In / Out 2:00 Pm 2:38 PM   Total Treatment Time 38 minutes   Total Timed Codes 38 minutes   1:1 Treatment Time 38 minutes      [facility] Totals Reminder:  bill using total billable   min of TIMED therapeutic procedures and modalities.   8-22 min = 1 unit; 23-37 min = 2 units; 38-52 min = 3 units; 53-67 min = 4 units; 68-82 min = 5 units            SUBJECTIVE    Pain Level (0-10 scale): 0/10    Any medication changes, allergies to medications, adverse drug reactions, diagnosis change, or new procedure performed?: [x] No    [] Yes (see summary sheet for update)  Medications: Verified on Patient Summary List    Subjective functional status/changes:     Patient states she still will have pain with certain movements but it's much better than it used to be.    OBJECTIVE      Therapeutic Procedures:  Tx Min Billable or 1:1 Min (if diff from Tx Min) Procedure, Rationale, Specifics   28  73087 Therapeutic Exercise (timed):  increase ROM, strength, coordination, balance, and proprioception to improve patient's ability to progress to PLOF and address remaining functional goals. (see flow sheet as applicable)     Details if applicable:     10 10 08788 Manual Therapy (timed):  decrease pain, increase ROM, and increase tissue extensibility to improve patient's ability to progress to PLOF and address remaining functional goals.  The manual therapy interventions were performed at a separate and distinct time from the therapeutic activities interventions . (see flow sheet

## 2024-03-21 ENCOUNTER — HOSPITAL ENCOUNTER (OUTPATIENT)
Facility: HOSPITAL | Age: 74
Setting detail: RECURRING SERIES
Discharge: HOME | End: 2024-03-24
Payer: MEDICARE

## 2024-03-21 PROCEDURE — 97110 THERAPEUTIC EXERCISES: CPT

## 2024-03-21 PROCEDURE — 97140 MANUAL THERAPY 1/> REGIONS: CPT

## 2024-03-21 NOTE — PROGRESS NOTES
PHYSICAL THERAPY - MEDICARE DAILY TREATMENT NOTE (updated 3/23)      Date: 3/21/2024          Patient Name:  Priya Orlando :  1950   Medical   Diagnosis:  Right shoulder pain [M25.511] Treatment Diagnosis:  M25.511  RIGHT SHOULDER PAIN    Referral Source:  Kenyetta Arredondo MD Insurance:   Payor: MEDICARE / Plan: MEDICARE PART A AND B / Product Type: *No Product type* /                     Patient  verified yes     Visit #   Current  / Total 165 MN   Time   In / Out 2:00 Pm 2:52 PM   Total Treatment Time 52 minutes   Total Timed Codes 42 minutes   1:1 Treatment Time 38 minutes       [facility] Totals Reminder:  bill using total billable   min of TIMED therapeutic procedures and modalities.   8-22 min = 1 unit; 23-37 min = 2 units; 38-52 min = 3 units; 53-67 min = 4 units; 68-82 min = 5 units            SUBJECTIVE    Pain Level (0-10 scale): 0/10    Any medication changes, allergies to medications, adverse drug reactions, diagnosis change, or new procedure performed?: [x] No    [] Yes (see summary sheet for update)  Medications: Verified on Patient Summary List    Subjective functional status/changes:     Patient reports she has more good days than bad days, some pain when she wakes in the am with some difficulty lifting her arm. State she is unable to lift her 30 pound grandchild into a baby swing.     OBJECTIVE    TTP R infraspinatus, supraspinatus at insertion,     AROM:  Flexion: 170 deg  Abduction: 157 deg slight pain  Functional IR: T6  Functional ER:T3    PROM R shoulder: flexion 157, IR 75 (tight at end ranges inf/post GH jt mobs)    MMT R shoulder:  Flexion: 4+/5  Scaption: 4+/5  IR:4/5  ER:4+/5  Biceps: 5/5  Triceps:5/5    FOTO Outcome measure: Patient’s Intake FS Score was 55 initially placing the patient in Stage 3. Patient’s FS score now is 59 on a scale approximating 0 - 100 (4 points of functional change since intake), placing the patient in Stage 3 and means patient has fair shoulder

## 2024-04-15 DIAGNOSIS — G89.29 CHRONIC SHOULDER PAIN, UNSPECIFIED LATERALITY: Primary | ICD-10-CM

## 2024-04-15 DIAGNOSIS — M25.519 CHRONIC SHOULDER PAIN, UNSPECIFIED LATERALITY: Primary | ICD-10-CM

## 2024-04-18 ENCOUNTER — HOSPITAL ENCOUNTER (OUTPATIENT)
Facility: HOSPITAL | Age: 74
End: 2024-04-18
Attending: ORTHOPAEDIC SURGERY
Payer: MEDICARE

## 2024-04-18 DIAGNOSIS — M25.511 ACUTE PAIN OF RIGHT SHOULDER: ICD-10-CM

## 2024-04-18 PROCEDURE — 2580000003 HC RX 258: Performed by: PHYSICIAN ASSISTANT

## 2024-04-18 PROCEDURE — 23350 INJECTION FOR SHOULDER X-RAY: CPT

## 2024-04-18 PROCEDURE — 2500000003 HC RX 250 WO HCPCS: Performed by: PHYSICIAN ASSISTANT

## 2024-04-18 PROCEDURE — A4216 STERILE WATER/SALINE, 10 ML: HCPCS | Performed by: PHYSICIAN ASSISTANT

## 2024-04-18 PROCEDURE — 6360000004 HC RX CONTRAST MEDICATION: Performed by: PHYSICIAN ASSISTANT

## 2024-04-18 PROCEDURE — 73201 CT UPPER EXTREMITY W/DYE: CPT

## 2024-04-18 RX ORDER — SODIUM CHLORIDE 9 MG/ML
10 INJECTION, SOLUTION INTRAMUSCULAR; INTRAVENOUS; SUBCUTANEOUS ONCE
Status: COMPLETED | OUTPATIENT
Start: 2024-04-18 | End: 2024-04-18

## 2024-04-18 RX ORDER — LIDOCAINE HYDROCHLORIDE 10 MG/ML
10 INJECTION, SOLUTION EPIDURAL; INFILTRATION; INTRACAUDAL; PERINEURAL ONCE
Status: COMPLETED | OUTPATIENT
Start: 2024-04-18 | End: 2024-04-18

## 2024-04-18 RX ADMIN — LIDOCAINE HYDROCHLORIDE 10 ML: 10 INJECTION, SOLUTION EPIDURAL; INFILTRATION; INTRACAUDAL; PERINEURAL at 14:40

## 2024-04-18 RX ADMIN — IOHEXOL 10 ML: 300 INJECTION, SOLUTION INTRAVENOUS at 14:40

## 2024-04-18 RX ADMIN — SODIUM CHLORIDE 10 ML: 9 INJECTION INTRAMUSCULAR; INTRAVENOUS; SUBCUTANEOUS at 14:40

## 2024-06-16 NOTE — PROGRESS NOTES
Arthritis     Hypercholesterolemia     Osteoarthritis     Sun-damaged skin     Sunburn, blistering        Past Surgical History:   Procedure Laterality Date    CATARACT REMOVAL Bilateral      SECTION      COLONOSCOPY  3/7/2012/    repeat in 5 years due family hx    GYN          ORTHOPEDIC SURGERY      rotator cuff tear       No Known Allergies    Social History     Tobacco Use    Smoking status: Never    Smokeless tobacco: Never   Substance Use Topics    Alcohol use: Yes     Alcohol/week: 1.0 standard drink of alcohol     Types: 1 Glasses of wine per week    Drug use: No       Family History   Problem Relation Age of Onset    Hypertension Mother     Stroke Mother     Cancer Father         leukemia           OBJECTIVE:    Physical Exam    Vitals:   Vitals:    24 1449 24 1520   BP: (!) 142/90 132/80   Site: Left Upper Arm Left Upper Arm   Position: Sitting Sitting   Cuff Size:  Medium Adult   Pulse: 67    SpO2: 98%    Weight: 60.1 kg (132 lb 6.4 oz)          General:    Alert, cooperative, no distress, appears stated age.   Neck:   Supple, no JVD.   Back:     Symmetric.   Lungs:     Clear to auscultation bilaterally.   Heart::    Regular rate and rhythm.  No murmur, click, rub or gallop.   Abdomen:     Soft, non-tender. Bowel sounds normal.    MSK:   Extremities normal, atraumatic.  Moves extremities independently.   Vasc/lymph:   No lower extremity edema.   Skin:   Skin color normal. No rashes or lesions on visible areas.   Neurologic:   Alert, moves all extremities.        Data Review:     Radiology:   XR Results (most recent):    CT Result (most recent):  CT SHOULDER RIGHT W CONTRAST 2024    Narrative  EXAM:  CT SHOULDER RIGHT W CONTRAST    INDICATION: Pain in right shoulder    COMPARISON: Fluoroscopy-guided right shoulder arthrogram 2024, right  shoulder radiographs 4/15/2024    TECHNIQUE: CT arthrogram of the right shoulder performed after the plain  arthrographic

## 2024-06-21 ENCOUNTER — OFFICE VISIT (OUTPATIENT)
Age: 74
End: 2024-06-21
Payer: MEDICARE

## 2024-06-21 VITALS
OXYGEN SATURATION: 98 % | DIASTOLIC BLOOD PRESSURE: 80 MMHG | HEART RATE: 67 BPM | SYSTOLIC BLOOD PRESSURE: 132 MMHG | BODY MASS INDEX: 24.22 KG/M2 | WEIGHT: 132.4 LBS

## 2024-06-21 DIAGNOSIS — I10 PRIMARY HYPERTENSION: ICD-10-CM

## 2024-06-21 DIAGNOSIS — I49.1 PAC (PREMATURE ATRIAL CONTRACTION): ICD-10-CM

## 2024-06-21 DIAGNOSIS — I47.10 PSVT (PAROXYSMAL SUPRAVENTRICULAR TACHYCARDIA) (HCC): Primary | ICD-10-CM

## 2024-06-21 DIAGNOSIS — I49.3 PVC (PREMATURE VENTRICULAR CONTRACTION): ICD-10-CM

## 2024-06-21 PROCEDURE — 3017F COLORECTAL CA SCREEN DOC REV: CPT | Performed by: NURSE PRACTITIONER

## 2024-06-21 PROCEDURE — 3079F DIAST BP 80-89 MM HG: CPT | Performed by: NURSE PRACTITIONER

## 2024-06-21 PROCEDURE — 3075F SYST BP GE 130 - 139MM HG: CPT | Performed by: NURSE PRACTITIONER

## 2024-06-21 PROCEDURE — G8399 PT W/DXA RESULTS DOCUMENT: HCPCS | Performed by: NURSE PRACTITIONER

## 2024-06-21 PROCEDURE — G8427 DOCREV CUR MEDS BY ELIG CLIN: HCPCS | Performed by: NURSE PRACTITIONER

## 2024-06-21 PROCEDURE — 1090F PRES/ABSN URINE INCON ASSESS: CPT | Performed by: NURSE PRACTITIONER

## 2024-06-21 PROCEDURE — 99214 OFFICE O/P EST MOD 30 MIN: CPT | Performed by: NURSE PRACTITIONER

## 2024-06-21 PROCEDURE — 1123F ACP DISCUSS/DSCN MKR DOCD: CPT | Performed by: NURSE PRACTITIONER

## 2024-06-21 PROCEDURE — G8420 CALC BMI NORM PARAMETERS: HCPCS | Performed by: NURSE PRACTITIONER

## 2024-06-21 PROCEDURE — 1036F TOBACCO NON-USER: CPT | Performed by: NURSE PRACTITIONER

## 2024-07-22 ENCOUNTER — HOSPITAL ENCOUNTER (OUTPATIENT)
Facility: HOSPITAL | Age: 74
Discharge: HOME | End: 2024-07-25
Attending: INTERNAL MEDICINE
Payer: MEDICARE

## 2024-07-22 ENCOUNTER — OFFICE VISIT (OUTPATIENT)
Dept: PRIMARY CARE CLINIC | Facility: CLINIC | Age: 74
End: 2024-07-22
Payer: MEDICARE

## 2024-07-22 VITALS
BODY MASS INDEX: 23.92 KG/M2 | TEMPERATURE: 98 F | RESPIRATION RATE: 15 BRPM | DIASTOLIC BLOOD PRESSURE: 80 MMHG | HEIGHT: 62 IN | WEIGHT: 130 LBS | SYSTOLIC BLOOD PRESSURE: 126 MMHG | HEART RATE: 61 BPM | OXYGEN SATURATION: 98 %

## 2024-07-22 DIAGNOSIS — M51.36 LUMBAR DEGENERATIVE DISC DISEASE: ICD-10-CM

## 2024-07-22 DIAGNOSIS — R11.0 NAUSEA: ICD-10-CM

## 2024-07-22 DIAGNOSIS — R20.2 NUMBNESS AND TINGLING OF LEG: Primary | ICD-10-CM

## 2024-07-22 DIAGNOSIS — R26.81 GAIT INSTABILITY: ICD-10-CM

## 2024-07-22 DIAGNOSIS — M25.551 RIGHT HIP PAIN: ICD-10-CM

## 2024-07-22 DIAGNOSIS — R20.0 NUMBNESS AND TINGLING OF LEG: Primary | ICD-10-CM

## 2024-07-22 PROCEDURE — 99214 OFFICE O/P EST MOD 30 MIN: CPT | Performed by: INTERNAL MEDICINE

## 2024-07-22 PROCEDURE — G8399 PT W/DXA RESULTS DOCUMENT: HCPCS | Performed by: INTERNAL MEDICINE

## 2024-07-22 PROCEDURE — 1036F TOBACCO NON-USER: CPT | Performed by: INTERNAL MEDICINE

## 2024-07-22 PROCEDURE — 3017F COLORECTAL CA SCREEN DOC REV: CPT | Performed by: INTERNAL MEDICINE

## 2024-07-22 PROCEDURE — 73502 X-RAY EXAM HIP UNI 2-3 VIEWS: CPT

## 2024-07-22 PROCEDURE — G8420 CALC BMI NORM PARAMETERS: HCPCS | Performed by: INTERNAL MEDICINE

## 2024-07-22 PROCEDURE — 3079F DIAST BP 80-89 MM HG: CPT | Performed by: INTERNAL MEDICINE

## 2024-07-22 PROCEDURE — 1090F PRES/ABSN URINE INCON ASSESS: CPT | Performed by: INTERNAL MEDICINE

## 2024-07-22 PROCEDURE — 3074F SYST BP LT 130 MM HG: CPT | Performed by: INTERNAL MEDICINE

## 2024-07-22 PROCEDURE — 1123F ACP DISCUSS/DSCN MKR DOCD: CPT | Performed by: INTERNAL MEDICINE

## 2024-07-22 PROCEDURE — 72100 X-RAY EXAM L-S SPINE 2/3 VWS: CPT

## 2024-07-22 PROCEDURE — G8427 DOCREV CUR MEDS BY ELIG CLIN: HCPCS | Performed by: INTERNAL MEDICINE

## 2024-07-22 NOTE — PROGRESS NOTES
Priya Orlando (:  1950) is a 74 y.o. female,Established patient, here for evaluation of the following chief complaint(s):  Tingling (Right foot and right leg tingling. Has been going on for months.)      History of Present Illness  The patient is a 74-year-old female who presents for evaluation of numbness and tingling in her right foot.    She has been experiencing numbness and tingling in her right foot, extending up to her knee, for several months. During her annual checkup, she was also experiencing foot pain due to a bunion, but the pain subsided. However, the numbness and tingling returned shortly thereafter. Despite attempts at self-massage and various exercises, the numbness and tingling persist. She also noticed an unusual gait and balance during a shoulder check-up. She noticed that her right leg appears thinner and less muscular than her left leg during strengthening exercises, leading her to suspect muscle atrophy. She denies any back pain or falls, but occasionally stumbles due to ankle pain. She also experiences intermittent right hip pain, particularly after poor sleep.    She has been experiencing intermittent nausea for some time, which has affected her appetite. She can only consume bread. She suspects her nausea may be related to her colitis, but Gastroenterologist  disagreed. She denies any reflux or weight loss. She experienced diarrhea, which resolved after a 2-week regimen of Pepto-Bismol.     Physical Exam  /80 (Site: Left Upper Arm, Position: Sitting)   Pulse 61   Temp 98 °F (36.7 °C) (Temporal)   Resp 15   Ht 1.575 m (5' 2\")   Wt 59 kg (130 lb)   SpO2 98%   BMI 23.78 kg/m²        Vitals and nursing note reviewed.   Constitutional:       Appearance: Normal appearance.   Eyes:      Extraocular Movements: Extraocular movements intact.      Pupils: Pupils are equal, round, and reactive to light.   Cardiovascular:      Rate and Rhythm: Normal rate and regular rhythm.

## 2024-08-01 DIAGNOSIS — M51.36 LUMBAR DEGENERATIVE DISC DISEASE: Primary | ICD-10-CM

## 2024-08-02 DIAGNOSIS — M51.36 LUMBAR DEGENERATIVE DISC DISEASE: Primary | ICD-10-CM

## 2024-08-09 DIAGNOSIS — E78.5 HYPERLIPIDEMIA, UNSPECIFIED HYPERLIPIDEMIA TYPE: ICD-10-CM

## 2024-08-09 RX ORDER — ATORVASTATIN CALCIUM 10 MG/1
TABLET, FILM COATED ORAL
Qty: 90 TABLET | Refills: 1 | Status: SHIPPED | OUTPATIENT
Start: 2024-08-09

## 2024-08-28 ENCOUNTER — HOSPITAL ENCOUNTER (OUTPATIENT)
Facility: HOSPITAL | Age: 74
Setting detail: RECURRING SERIES
Discharge: HOME | End: 2024-08-31
Attending: INTERNAL MEDICINE
Payer: MEDICARE

## 2024-08-28 PROCEDURE — 97110 THERAPEUTIC EXERCISES: CPT

## 2024-08-28 PROCEDURE — 97161 PT EVAL LOW COMPLEX 20 MIN: CPT

## 2024-08-28 NOTE — THERAPY EVALUATION
Physical Therapy at [facility],   a part of [facility]  [address] [id number]  Phone:[phone] Fax:[phone]                                                                            PHYSICAL THERAPY - MEDICARE EVALUATION/PLAN OF CARE NOTE (updated 3/23)      Date: 2024          Patient Name:  Priya Orlando :  1950   Medical   Diagnosis:  Lumbar degenerative disc disease [M51.36] Treatment Diagnosis:  M54.41  LUMBAGO WITH SCIATICA, RIGHT SIDE    Referral Source:  Kenyetta Arredondo MD Provider #:  [id number]                  Insurance: Payor: MEDICARE / Plan: MEDICARE PART A AND B / Product Type: *No Product type* /      Patient  verified yes     Visit #   Current  / Total 1 MN   Time   In / Out 11:05 am 11:50 am   Total Treatment Time 45   Total Timed Codes 10   1:1 Treatment Time 10      [facility] BC Totals Reminder:  bill using total billable   min of TIMED therapeutic procedures and modalities.   8-22 min = 1 unit; 23-37 min = 2 units; 38-52 min = 3 units;  53-67 min = 4 units; 68-82 min = 5 units           SUBJECTIVE  Pain Level (0-10 scale): 0  []constant []intermittent []improving []worsening []no change since onset    Any medication changes, allergies to medications, adverse drug reactions, diagnosis change, or new procedure performed?: [x] No    [] Yes (see summary sheet for update)  Medications: Verified on Patient Summary List    Subjective functional status/changes:     Pt complains of R LE numbness/tingling that started about 6-12 months ago w/ insidious onset. Pt reports she noticed the tingling on the top of her R thigh that travels down to the top of her R foot. Pt believes the numbness is random. Pt went to MD and x-rays showed L3 compression.     Start of Care: 2024  Onset Date: see above  Current symptoms/Complaints: see above  Mechanism of Injury: insidious  PLOF: walking for exercise  Limitations to PLOF/Activity or

## 2024-09-04 ENCOUNTER — HOSPITAL ENCOUNTER (OUTPATIENT)
Facility: HOSPITAL | Age: 74
Setting detail: RECURRING SERIES
Discharge: HOME | End: 2024-09-07
Attending: INTERNAL MEDICINE
Payer: MEDICARE

## 2024-09-04 PROCEDURE — 97140 MANUAL THERAPY 1/> REGIONS: CPT

## 2024-09-04 PROCEDURE — 97110 THERAPEUTIC EXERCISES: CPT

## 2024-09-04 NOTE — PROGRESS NOTES
if applicable:  RLE LAD. Manual HS, hip ER, and quad stretch.           Details if applicable:     30     Total Total         Modalities Rationale:     decrease inflammation and decrease pain to improve patient's ability to progress to PLOF and address remaining functional goals.       min [] Estim Unattended,             type/location:       []  w/ice    []  w/heat        min [] Estim Attended,             type/location:       []  w/ice   []  w/heat         []  w/US   []  TENS insruct            min []  Mechanical Traction,        type/lbs:        []  pro      []  sup           []  int       []  cont            []  before manual           []  after manual     min []  Ultrasound,         settings/location:     10 min  unbilled [x]  Ice     []  Heat            location/position: low back & R hip         min []  Vasopneumatic Device,      press/temp:   pre-treatment girth :    post-treatment girth :    measured at (landmark       location) :   If using vaso (only need to measure limb vaso being performed on)        min []  Other:      Skin assessment post-treatment (if applicable):    [x]  intact    []  redness- no adverse reaction                 []redness - adverse reaction:          [x]  Patient Education billed concurrently with other procedures   [x] Review HEP    [] Progressed/Changed HEP, detail:    [] Other detail:         Other Objective/Functional Measures  NT    Pain Level at end of session (0-10 scale): \"good\"      Assessment   Improved symptoms with manual stretches and LAD. Cued for smaller ROM with SKTC due to increased pain in right hip. Otherwise very good tolerance to exercises.   Patient will continue to benefit from skilled PT / OT services to modify and progress therapeutic interventions, analyze and address functional mobility deficits, analyze and address ROM deficits, analyze and address strength deficits, analyze and address soft tissue restrictions, and analyze and cue for proper movement

## 2024-09-09 ENCOUNTER — HOSPITAL ENCOUNTER (OUTPATIENT)
Facility: HOSPITAL | Age: 74
Setting detail: RECURRING SERIES
Discharge: HOME | End: 2024-09-12
Attending: INTERNAL MEDICINE
Payer: MEDICARE

## 2024-09-09 PROCEDURE — 97110 THERAPEUTIC EXERCISES: CPT

## 2024-09-09 PROCEDURE — 97140 MANUAL THERAPY 1/> REGIONS: CPT

## 2024-09-11 ENCOUNTER — HOSPITAL ENCOUNTER (OUTPATIENT)
Facility: HOSPITAL | Age: 74
Setting detail: RECURRING SERIES
Discharge: HOME | End: 2024-09-14
Attending: INTERNAL MEDICINE
Payer: MEDICARE

## 2024-09-11 PROCEDURE — 97110 THERAPEUTIC EXERCISES: CPT

## 2024-09-11 PROCEDURE — 97140 MANUAL THERAPY 1/> REGIONS: CPT

## 2024-09-16 ENCOUNTER — HOSPITAL ENCOUNTER (OUTPATIENT)
Facility: HOSPITAL | Age: 74
Setting detail: RECURRING SERIES
Discharge: HOME | End: 2024-09-19
Attending: INTERNAL MEDICINE
Payer: MEDICARE

## 2024-09-16 PROCEDURE — 97110 THERAPEUTIC EXERCISES: CPT

## 2024-09-16 PROCEDURE — 97140 MANUAL THERAPY 1/> REGIONS: CPT

## 2024-09-16 RX ORDER — METOPROLOL TARTRATE 25 MG/1
TABLET, FILM COATED ORAL
Qty: 180 TABLET | Refills: 3 | Status: SHIPPED | OUTPATIENT
Start: 2024-09-16

## 2024-09-18 ENCOUNTER — HOSPITAL ENCOUNTER (OUTPATIENT)
Facility: HOSPITAL | Age: 74
Setting detail: RECURRING SERIES
Discharge: HOME | End: 2024-09-21
Attending: INTERNAL MEDICINE
Payer: MEDICARE

## 2024-09-18 PROCEDURE — 97110 THERAPEUTIC EXERCISES: CPT

## 2024-09-18 PROCEDURE — 97140 MANUAL THERAPY 1/> REGIONS: CPT

## 2024-09-23 ENCOUNTER — HOSPITAL ENCOUNTER (OUTPATIENT)
Facility: HOSPITAL | Age: 74
Setting detail: RECURRING SERIES
Discharge: HOME | End: 2024-09-26
Attending: INTERNAL MEDICINE
Payer: MEDICARE

## 2024-09-23 PROCEDURE — 97140 MANUAL THERAPY 1/> REGIONS: CPT

## 2024-09-23 PROCEDURE — 97110 THERAPEUTIC EXERCISES: CPT

## 2024-09-25 ENCOUNTER — HOSPITAL ENCOUNTER (OUTPATIENT)
Facility: HOSPITAL | Age: 74
Setting detail: RECURRING SERIES
Discharge: HOME | End: 2024-09-28
Attending: INTERNAL MEDICINE
Payer: MEDICARE

## 2024-09-25 PROCEDURE — 97140 MANUAL THERAPY 1/> REGIONS: CPT

## 2024-09-30 ENCOUNTER — APPOINTMENT (OUTPATIENT)
Facility: HOSPITAL | Age: 74
End: 2024-09-30
Attending: INTERNAL MEDICINE
Payer: MEDICARE

## 2024-10-02 ENCOUNTER — APPOINTMENT (OUTPATIENT)
Facility: HOSPITAL | Age: 74
End: 2024-10-02
Attending: INTERNAL MEDICINE
Payer: MEDICARE

## 2024-10-09 ENCOUNTER — HOSPITAL ENCOUNTER (OUTPATIENT)
Facility: HOSPITAL | Age: 74
Setting detail: RECURRING SERIES
Discharge: HOME | End: 2024-10-12
Attending: INTERNAL MEDICINE
Payer: MEDICARE

## 2024-10-09 PROCEDURE — 97140 MANUAL THERAPY 1/> REGIONS: CPT

## 2024-10-09 PROCEDURE — 97110 THERAPEUTIC EXERCISES: CPT

## 2024-10-09 NOTE — PROGRESS NOTES
PHYSICAL THERAPY - MEDICARE DAILY TREATMENT/PROGRESS NOTE (updated 3/23)      Date: 10/9/2024          Patient Name:  Priya Orlando :  1950   Medical   Diagnosis:  Lumbar degenerative disc disease [M51.369] Treatment Diagnosis:  M54.41  LUMBAGO WITH SCIATICA, RIGHT SIDE    Referral Source:  Kenyetta Arredondo MD Insurance:   Payor: MEDICARE / Plan: MEDICARE PART A AND B / Product Type: *No Product type* /                     Patient  verified yes     Visit #   Current  / Total 9 MN   Time   In / Out 2:00 PM 2:50 PM   Total Treatment Time 50   Total Timed Codes 40   1:1 Treatment Time 40      MC BC Totals Reminder:  bill using total billable   min of TIMED therapeutic procedures and modalities.   8-22 min = 1 unit; 23-37 min = 2 units; 38-52 min = 3 units; 53-67 min = 4 units; 68-82 min = 5 units            SUBJECTIVE    Pain Level (0-10 scale): 0    Any medication changes, allergies to medications, adverse drug reactions, diagnosis change, or new procedure performed?: [x] No    [] Yes (see summary sheet for update)  Medications: Verified on Patient Summary List    Subjective functional status/changes:     Pt reports she feels better in her hip but her numbness is back to the way it was before.    OBJECTIVE      Therapeutic Procedures:  Tx Min Billable or 1:1 Min (if diff from Tx Min) Procedure, Rationale, Specifics   25  83669 Therapeutic Exercise (timed):  increase ROM, strength, coordination, balance, and proprioception to improve patient's ability to progress to PLOF and address remaining functional goals. (see flow sheet as applicable)     Details if applicable:  hold from SKTC stretch, modified piriformis to \"push away\"    15  88381 Manual Therapy (timed):  decrease pain, increase ROM, and increase tissue extensibility to improve patient's ability to progress to PLOF and address remaining functional goals.  The manual therapy interventions were performed at a separate and distinct time from the

## 2024-10-14 ENCOUNTER — HOSPITAL ENCOUNTER (OUTPATIENT)
Facility: HOSPITAL | Age: 74
Setting detail: RECURRING SERIES
Discharge: HOME | End: 2024-10-17
Attending: INTERNAL MEDICINE
Payer: MEDICARE

## 2024-10-14 ENCOUNTER — TELEPHONE (OUTPATIENT)
Age: 74
End: 2024-10-14

## 2024-10-14 PROCEDURE — 97140 MANUAL THERAPY 1/> REGIONS: CPT

## 2024-10-14 PROCEDURE — 97110 THERAPEUTIC EXERCISES: CPT

## 2024-10-14 NOTE — PROGRESS NOTES
and cue for proper movement patterns to address functional deficits and attain remaining goals.    Progress toward goals / Updated goals:  []  See Progress Note/Recertification      PLAN  Yes  Continue plan of care  Re-Cert Due: 24 treatments  [x]  Upgrade activities as tolerated  []  Discharge due to:  []  Other:      Francis Samuel, PT       10/14/2024       9:39 AM

## 2024-10-15 ENCOUNTER — TELEPHONE (OUTPATIENT)
Age: 74
End: 2024-10-15

## 2024-10-18 ENCOUNTER — HOSPITAL ENCOUNTER (OUTPATIENT)
Facility: HOSPITAL | Age: 74
Setting detail: RECURRING SERIES
Discharge: HOME | End: 2024-10-21
Attending: INTERNAL MEDICINE
Payer: MEDICARE

## 2024-10-18 PROCEDURE — 20560 NDL INSJ W/O NJX 1 OR 2 MUSC: CPT

## 2024-10-18 PROCEDURE — 97140 MANUAL THERAPY 1/> REGIONS: CPT

## 2024-10-18 NOTE — PROGRESS NOTES
applicable:  RLE LAD, R iliacus release, Manual HS stretch, STM R gluts, piriformis and TFL in s/l with pillow b/w knees          Details if applicable:     10     Total Total     10   min Dry Needling without E-Stim         (not to be included in total timed codes     or 1:1 Treatment Time)     min Dry Needling with E-Stim: Attended      Type:       Muscles:     min Dry Needling with E-Stim: Unattended      Type:       Muscles:      Billing:  [x]  20560 Needle Insertion w/o Injection (1-2 muscles) (un-timed)                        []  20561 Needle Insertion w/o Injection (3+ muscles) (un-timed)         [x]  Script obtained from MD specifying \"Dry Needling\"    Written Informed Consent Obtained and Document Included in Chart: YES    Procedure: Intramuscular Dry Needling was done with a 50 mm gauge solid monofilament needle,                       under aseptic technique      Rationale/Necessity: deactivate trigger points, improve muscle spasms, improve muscle activation, eliminate muscle imbalances,                                      and decrease myofascial pain to improve patient's ability to progress to PLOF   and address remaining functional goals.    Muscles Treated:  []  Bilateral:                                  [x]  Right: glutes and hip flexor                                 []  Left:                            []  Hemostasis applied after each needle application     Response: Localized Twitch Response, Increased ROM, Increased Tolerance to Exercise/Activity, Improve Soft Tissue Mobility, and Reduced Tightness    Education: Purpose or rationale of dry needling                      Side effects and possible adverse effects of the treatment                      The need to report the use of blood thinners and/or immunosuppressant medications                      How to respond to possible adverse effects of the treatment                       Self-treatment of post needling soreness (ice/heat, stretching,

## 2024-10-21 ENCOUNTER — HOSPITAL ENCOUNTER (OUTPATIENT)
Facility: HOSPITAL | Age: 74
Setting detail: RECURRING SERIES
Discharge: HOME | End: 2024-10-24
Attending: INTERNAL MEDICINE
Payer: MEDICARE

## 2024-10-21 PROCEDURE — 97140 MANUAL THERAPY 1/> REGIONS: CPT

## 2024-10-21 PROCEDURE — 20560 NDL INSJ W/O NJX 1 OR 2 MUSC: CPT

## 2024-10-21 NOTE — PROGRESS NOTES
PHYSICAL THERAPY - MEDICARE DAILY TREATMENT NOTE (updated 3/23)      Date: 10/21/2024          Patient Name:  Priya Orlando :  1950   Medical   Diagnosis:  Lumbar degenerative disc disease [M51.369] Treatment Diagnosis:  M54.41  LUMBAGO WITH SCIATICA, RIGHT SIDE    Referral Source:  Kenyetta Arredondo MD Insurance:   Payor: MEDICARE / Plan: MEDICARE PART A AND B / Product Type: *No Product type* /                     Patient  verified yes     Visit #   Current  / Total 12 MN   Time   In / Out 10:00 AM 10:35 AM   Total Treatment Time 35   Total Timed Codes 10   1:1 Treatment Time 10      MC BC Totals Reminder:  bill using total billable   min of TIMED therapeutic procedures and modalities.   8-22 min = 1 unit; 23-37 min = 2 units; 38-52 min = 3 units; 53-67 min = 4 units; 68-82 min = 5 units            SUBJECTIVE    Pain Level (0-10 scale): 0    Any medication changes, allergies to medications, adverse drug reactions, diagnosis change, or new procedure performed?: [x] No    [] Yes (see summary sheet for update)  Medications: Verified on Patient Summary List    Subjective functional status/changes:     Pt reports she would like to try DN today.    OBJECTIVE      Therapeutic Procedures:  Tx Min Billable or 1:1 Min (if diff from Tx Min) Procedure, Rationale, Specifics     39612 Therapeutic Exercise (timed):  increase ROM, strength, coordination, balance, and proprioception to improve patient's ability to progress to PLOF and address remaining functional goals. (see flow sheet as applicable)     Details if applicable:     10  22803 Manual Therapy (timed):  decrease pain, increase ROM, and increase tissue extensibility to improve patient's ability to progress to PLOF and address remaining functional goals.  The manual therapy interventions were performed at a separate and distinct time from the therapeutic activities interventions . (see flow sheet as applicable)     Details if applicable:  RLE LAD, R iliacus

## 2024-10-24 ENCOUNTER — HOSPITAL ENCOUNTER (OUTPATIENT)
Facility: HOSPITAL | Age: 74
Setting detail: RECURRING SERIES
Discharge: HOME | End: 2024-10-27
Attending: INTERNAL MEDICINE
Payer: MEDICARE

## 2024-10-24 PROCEDURE — 97140 MANUAL THERAPY 1/> REGIONS: CPT

## 2024-10-24 PROCEDURE — 20560 NDL INSJ W/O NJX 1 OR 2 MUSC: CPT

## 2024-10-24 NOTE — PROGRESS NOTES
PHYSICAL THERAPY - MEDICARE DAILY TREATMENT NOTE (updated 3/23)      Date: 10/24/2024          Patient Name:  Priya Orlando :  1950   Medical   Diagnosis:  Lumbar degenerative disc disease [M51.369] Treatment Diagnosis:  M54.41  LUMBAGO WITH SCIATICA, RIGHT SIDE    Referral Source:  Kenyetta Arredondo MD Insurance:   Payor: MEDICARE / Plan: MEDICARE PART A AND B / Product Type: *No Product type* /                     Patient  verified yes     Visit #   Current  / Total 13 MN   Time   In / Out 2:30 PM 3:05 PM   Total Treatment Time 35   Total Timed Codes 10   1:1 Treatment Time 10      MC BC Totals Reminder:  bill using total billable   min of TIMED therapeutic procedures and modalities.   8-22 min = 1 unit; 23-37 min = 2 units; 38-52 min = 3 units; 53-67 min = 4 units; 68-82 min = 5 units            SUBJECTIVE    Pain Level (0-10 scale): 0    Any medication changes, allergies to medications, adverse drug reactions, diagnosis change, or new procedure performed?: [x] No    [] Yes (see summary sheet for update)  Medications: Verified on Patient Summary List    Subjective functional status/changes:     Pt reports she felt great after DN last session.    OBJECTIVE      Therapeutic Procedures:  Tx Min Billable or 1:1 Min (if diff from Tx Min) Procedure, Rationale, Specifics     23748 Therapeutic Exercise (timed):  increase ROM, strength, coordination, balance, and proprioception to improve patient's ability to progress to PLOF and address remaining functional goals. (see flow sheet as applicable)     Details if applicable:     10  64388 Manual Therapy (timed):  decrease pain, increase ROM, and increase tissue extensibility to improve patient's ability to progress to PLOF and address remaining functional goals.  The manual therapy interventions were performed at a separate and distinct time from the therapeutic activities interventions . (see flow sheet as applicable)     Details if applicable:  JUAN DUNNE  English

## 2024-10-28 ENCOUNTER — HOSPITAL ENCOUNTER (OUTPATIENT)
Facility: HOSPITAL | Age: 74
Setting detail: RECURRING SERIES
Discharge: HOME | End: 2024-10-31
Attending: INTERNAL MEDICINE
Payer: MEDICARE

## 2024-10-28 PROCEDURE — 97110 THERAPEUTIC EXERCISES: CPT

## 2024-10-28 PROCEDURE — 97140 MANUAL THERAPY 1/> REGIONS: CPT

## 2024-10-28 NOTE — PROGRESS NOTES
PHYSICAL THERAPY - MEDICARE DAILY TREATMENT NOTE (updated 3/23)      Date: 10/28/2024          Patient Name:  Priya Orlando :  1950   Medical   Diagnosis:  Lumbar degenerative disc disease [M51.369] Treatment Diagnosis:  M54.41  LUMBAGO WITH SCIATICA, RIGHT SIDE    Referral Source:  Kenyetta Arredondo MD Insurance:   Payor: MEDICARE / Plan: MEDICARE PART A AND B / Product Type: *No Product type* /                     Patient  verified yes     Visit #   Current  / Total 14 MN   Time   In / Out 10:30 am 11:10 am   Total Treatment Time 40   Total Timed Codes 40   1:1 Treatment Time 40      MC BC Totals Reminder:  bill using total billable   min of TIMED therapeutic procedures and modalities.   8-22 min = 1 unit; 23-37 min = 2 units; 38-52 min = 3 units; 53-67 min = 4 units; 68-82 min = 5 units            SUBJECTIVE    Pain Level (0-10 scale): 0    Any medication changes, allergies to medications, adverse drug reactions, diagnosis change, or new procedure performed?: [x] No    [] Yes (see summary sheet for update)  Medications: Verified on Patient Summary List    Subjective functional status/changes:     Pt reports she was sore in her hip after last session. Pt reports her numbness is 50% better overall.    OBJECTIVE      Therapeutic Procedures:  Tx Min Billable or 1:1 Min (if diff from Tx Min) Procedure, Rationale, Specifics   25  03758 Therapeutic Exercise (timed):  increase ROM, strength, coordination, balance, and proprioception to improve patient's ability to progress to PLOF and address remaining functional goals. (see flow sheet as applicable)     Details if applicable:     15  35258 Manual Therapy (timed):  decrease pain, increase ROM, and increase tissue extensibility to improve patient's ability to progress to PLOF and address remaining functional goals.  The manual therapy interventions were performed at a separate and distinct time from the therapeutic activities interventions . (see flow sheet as

## 2024-10-31 ENCOUNTER — HOSPITAL ENCOUNTER (OUTPATIENT)
Facility: HOSPITAL | Age: 74
Setting detail: RECURRING SERIES
Discharge: HOME | End: 2024-11-03
Attending: INTERNAL MEDICINE
Payer: MEDICARE

## 2024-10-31 PROCEDURE — G0283 ELEC STIM OTHER THAN WOUND: HCPCS

## 2024-10-31 PROCEDURE — 97140 MANUAL THERAPY 1/> REGIONS: CPT

## 2024-10-31 PROCEDURE — 97110 THERAPEUTIC EXERCISES: CPT

## 2024-10-31 NOTE — PROGRESS NOTES
PHYSICAL THERAPY - MEDICARE DAILY TREATMENT NOTE (updated 3/23)      Date: 10/31/2024          Patient Name:  Priya Orlando :  1950   Medical   Diagnosis:  Lumbar degenerative disc disease [M51.369] Treatment Diagnosis:  M54.41  LUMBAGO WITH SCIATICA, RIGHT SIDE    Referral Source:  Kenyetta Arredondo MD Insurance:   Payor: MEDICARE / Plan: MEDICARE PART A AND B / Product Type: *No Product type* /                     Patient  verified yes     Visit #   Current  / Total 15 MN   Time   In / Out 11:00 am 11:55 am   Total Treatment Time 55   Total Timed Codes 45   1:1 Treatment Time 35      MC BC Totals Reminder:  bill using total billable   min of TIMED therapeutic procedures and modalities.   8-22 min = 1 unit; 23-37 min = 2 units; 38-52 min = 3 units; 53-67 min = 4 units; 68-82 min = 5 units            SUBJECTIVE    Pain Level (0-10 scale): 0    Any medication changes, allergies to medications, adverse drug reactions, diagnosis change, or new procedure performed?: [x] No    [] Yes (see summary sheet for update)  Medications: Verified on Patient Summary List    Subjective functional status/changes:     Pt reports she is doing ok overall but still feeling numbness at times.     OBJECTIVE      Therapeutic Procedures:  Tx Min Billable or 1:1 Min (if diff from Tx Min) Procedure, Rationale, Specifics   30 20 34738 Therapeutic Exercise (timed):  increase ROM, strength, coordination, balance, and proprioception to improve patient's ability to progress to PLOF and address remaining functional goals. (see flow sheet as applicable)     Details if applicable:     15 15 21118 Manual Therapy (timed):  decrease pain, increase ROM, and increase tissue extensibility to improve patient's ability to progress to PLOF and address remaining functional goals.  The manual therapy interventions were performed at a separate and distinct time from the therapeutic activities interventions . (see flow sheet as applicable)     Details

## 2024-11-06 ENCOUNTER — OFFICE VISIT (OUTPATIENT)
Dept: PRIMARY CARE CLINIC | Facility: CLINIC | Age: 74
End: 2024-11-06

## 2024-11-06 VITALS
RESPIRATION RATE: 16 BRPM | BODY MASS INDEX: 24.11 KG/M2 | DIASTOLIC BLOOD PRESSURE: 88 MMHG | OXYGEN SATURATION: 97 % | WEIGHT: 131 LBS | SYSTOLIC BLOOD PRESSURE: 130 MMHG | TEMPERATURE: 97.1 F | HEART RATE: 102 BPM | HEIGHT: 62 IN

## 2024-11-06 DIAGNOSIS — R00.2 INTERMITTENT PALPITATIONS: Primary | ICD-10-CM

## 2024-11-06 DIAGNOSIS — F32.9 REACTIVE DEPRESSION: ICD-10-CM

## 2024-11-06 DIAGNOSIS — M47.27 LUMBOSACRAL RADICULOPATHY DUE TO DEGENERATIVE JOINT DISEASE OF SPINE: ICD-10-CM

## 2024-11-06 DIAGNOSIS — I10 PRIMARY HYPERTENSION: ICD-10-CM

## 2024-11-06 RX ORDER — FLUOXETINE 10 MG/1
10 CAPSULE ORAL DAILY
Qty: 30 CAPSULE | Refills: 0 | Status: SHIPPED | OUTPATIENT
Start: 2024-11-06

## 2024-11-06 ASSESSMENT — PATIENT HEALTH QUESTIONNAIRE - PHQ9
SUM OF ALL RESPONSES TO PHQ QUESTIONS 1-9: 2
2. FEELING DOWN, DEPRESSED OR HOPELESS: SEVERAL DAYS
SUM OF ALL RESPONSES TO PHQ QUESTIONS 1-9: 2
1. LITTLE INTEREST OR PLEASURE IN DOING THINGS: SEVERAL DAYS
SUM OF ALL RESPONSES TO PHQ QUESTIONS 1-9: 2
SUM OF ALL RESPONSES TO PHQ QUESTIONS 1-9: 2
SUM OF ALL RESPONSES TO PHQ9 QUESTIONS 1 & 2: 2

## 2024-11-06 NOTE — PROGRESS NOTES
Priya Orlando (:  1950) is a 74 y.o. female,Established patient, here for evaluation of the following chief complaint(s):  Depression    History of Present Illness  The patient is a 74-year-old female seen today for depression.    She has been dealing with depression for an extended period and has developed coping strategies that have been effective. However, she is currently struggling with persistent numbness and tingling in her foot and leg, which has not improved despite 7 weeks of physical therapy. She also experiences intermittent hip pain and is considering an MRI as the next step in her treatment. She mentions that she has been having stress at home.  She feels trapped in her marriage and is seeking a help from a therapist. She has been searching for a psychiatrist but has been unsuccessful so far. She believes medication might be beneficial. She is currently taking trazodone, which helps her sleep, although she occasionally wakes up during the night. She has previously tried Prozac, which was effective, but had to discontinue it due to sleep disturbances.    She reports experiencing a rapid heart rate, which she noticed upon arrival at the clinic. She took her metoprolol medication this morning, which she typically takes twice daily, in the morning and evening. She also mentions feeling anxious about her current situation. She monitors her blood pressure at home and reports that it has been within normal range during recent doctor's visits.     Physical Exam  /88   Pulse (!) 102   Temp 97.1 °F (36.2 °C) (Temporal)   Resp 16   Ht 1.575 m (5' 2\")   Wt 59.4 kg (131 lb)   SpO2 97%   BMI 23.96 kg/m²    Vitals and nursing note reviewed.   Constitutional:       Appearance: Normal appearance.    Eyes:      Extraocular Movements: Extraocular movements intact.      Pupils: Pupils are equal, round, and reactive to light.   Cardiovascular:      Rate and Rhythm: Normal rate and regular rhythm.

## 2024-11-06 NOTE — PROGRESS NOTES
\"Have you been to the ER, urgent care clinic since your last visit?  Hospitalized since your last visit?\"    NO    “Have you seen or consulted any other health care providers outside our system since your last visit?”    NO    Have you had a mammogram?”   YES - Where: [facility] Nurse/ARIK to request most recent records if not in the chart    Date of last Mammogram: 10/11/2023

## 2024-11-07 DIAGNOSIS — M85.89 OSTEOPENIA OF MULTIPLE SITES: ICD-10-CM

## 2024-11-07 RX ORDER — ALENDRONATE SODIUM 70 MG/1
70 TABLET ORAL
Qty: 12 TABLET | Refills: 3 | Status: SHIPPED | OUTPATIENT
Start: 2024-11-07

## 2024-11-18 SDOH — HEALTH STABILITY: PHYSICAL HEALTH: ON AVERAGE, HOW MANY DAYS PER WEEK DO YOU ENGAGE IN MODERATE TO STRENUOUS EXERCISE (LIKE A BRISK WALK)?: 4 DAYS

## 2024-11-18 SDOH — HEALTH STABILITY: PHYSICAL HEALTH: ON AVERAGE, HOW MANY MINUTES DO YOU ENGAGE IN EXERCISE AT THIS LEVEL?: 40 MIN

## 2024-11-18 ASSESSMENT — PATIENT HEALTH QUESTIONNAIRE - PHQ9
9. THOUGHTS THAT YOU WOULD BE BETTER OFF DEAD, OR OF HURTING YOURSELF: NOT AT ALL
7. TROUBLE CONCENTRATING ON THINGS, SUCH AS READING THE NEWSPAPER OR WATCHING TELEVISION: NOT AT ALL
3. TROUBLE FALLING OR STAYING ASLEEP: SEVERAL DAYS
8. MOVING OR SPEAKING SO SLOWLY THAT OTHER PEOPLE COULD HAVE NOTICED. OR THE OPPOSITE, BEING SO FIGETY OR RESTLESS THAT YOU HAVE BEEN MOVING AROUND A LOT MORE THAN USUAL: NOT AT ALL
4. FEELING TIRED OR HAVING LITTLE ENERGY: NEARLY EVERY DAY
6. FEELING BAD ABOUT YOURSELF - OR THAT YOU ARE A FAILURE OR HAVE LET YOURSELF OR YOUR FAMILY DOWN: NEARLY EVERY DAY
2. FEELING DOWN, DEPRESSED OR HOPELESS: NEARLY EVERY DAY
SUM OF ALL RESPONSES TO PHQ QUESTIONS 1-9: 13
SUM OF ALL RESPONSES TO PHQ QUESTIONS 1-9: 13
1. LITTLE INTEREST OR PLEASURE IN DOING THINGS: MORE THAN HALF THE DAYS
10. IF YOU CHECKED OFF ANY PROBLEMS, HOW DIFFICULT HAVE THESE PROBLEMS MADE IT FOR YOU TO DO YOUR WORK, TAKE CARE OF THINGS AT HOME, OR GET ALONG WITH OTHER PEOPLE: SOMEWHAT DIFFICULT
SUM OF ALL RESPONSES TO PHQ QUESTIONS 1-9: 13
5. POOR APPETITE OR OVEREATING: SEVERAL DAYS
SUM OF ALL RESPONSES TO PHQ QUESTIONS 1-9: 13
SUM OF ALL RESPONSES TO PHQ9 QUESTIONS 1 & 2: 5

## 2024-11-18 ASSESSMENT — LIFESTYLE VARIABLES
HOW MANY STANDARD DRINKS CONTAINING ALCOHOL DO YOU HAVE ON A TYPICAL DAY: 1
HOW MANY STANDARD DRINKS CONTAINING ALCOHOL DO YOU HAVE ON A TYPICAL DAY: 1 OR 2
HOW OFTEN DO YOU HAVE A DRINK CONTAINING ALCOHOL: 2-4 TIMES A MONTH
HOW OFTEN DO YOU HAVE SIX OR MORE DRINKS ON ONE OCCASION: 1
HOW OFTEN DO YOU HAVE A DRINK CONTAINING ALCOHOL: 3

## 2024-11-21 ENCOUNTER — HOSPITAL ENCOUNTER (OUTPATIENT)
Facility: HOSPITAL | Age: 74
Discharge: HOME | End: 2024-11-21
Attending: INTERNAL MEDICINE
Payer: MEDICARE

## 2024-11-21 DIAGNOSIS — M47.27 LUMBOSACRAL RADICULOPATHY DUE TO DEGENERATIVE JOINT DISEASE OF SPINE: ICD-10-CM

## 2024-11-21 PROCEDURE — 6360000004 HC RX CONTRAST MEDICATION: Performed by: RADIOLOGY

## 2024-11-21 PROCEDURE — 72158 MRI LUMBAR SPINE W/O & W/DYE: CPT

## 2024-11-21 PROCEDURE — A9579 GAD-BASE MR CONTRAST NOS,1ML: HCPCS | Performed by: RADIOLOGY

## 2024-11-21 RX ADMIN — GADOTERIDOL 12 ML: 279.3 INJECTION, SOLUTION INTRAVENOUS at 09:14

## 2024-11-23 SDOH — ECONOMIC STABILITY: FOOD INSECURITY: WITHIN THE PAST 12 MONTHS, YOU WORRIED THAT YOUR FOOD WOULD RUN OUT BEFORE YOU GOT MONEY TO BUY MORE.: NEVER TRUE

## 2024-11-23 SDOH — ECONOMIC STABILITY: FOOD INSECURITY: WITHIN THE PAST 12 MONTHS, THE FOOD YOU BOUGHT JUST DIDN'T LAST AND YOU DIDN'T HAVE MONEY TO GET MORE.: NEVER TRUE

## 2024-11-23 SDOH — ECONOMIC STABILITY: INCOME INSECURITY: HOW HARD IS IT FOR YOU TO PAY FOR THE VERY BASICS LIKE FOOD, HOUSING, MEDICAL CARE, AND HEATING?: NOT HARD AT ALL

## 2024-11-25 ENCOUNTER — OFFICE VISIT (OUTPATIENT)
Dept: PRIMARY CARE CLINIC | Facility: CLINIC | Age: 74
End: 2024-11-25

## 2024-11-25 VITALS
HEART RATE: 59 BPM | BODY MASS INDEX: 23.81 KG/M2 | RESPIRATION RATE: 16 BRPM | OXYGEN SATURATION: 97 % | TEMPERATURE: 97.5 F | SYSTOLIC BLOOD PRESSURE: 109 MMHG | HEIGHT: 62 IN | WEIGHT: 129.4 LBS | DIASTOLIC BLOOD PRESSURE: 72 MMHG

## 2024-11-25 DIAGNOSIS — I47.10 PSVT (PAROXYSMAL SUPRAVENTRICULAR TACHYCARDIA) (HCC): ICD-10-CM

## 2024-11-25 DIAGNOSIS — R17 HIGH BILIRUBIN: Primary | ICD-10-CM

## 2024-11-25 DIAGNOSIS — Z00.00 MEDICARE ANNUAL WELLNESS VISIT, SUBSEQUENT: Primary | ICD-10-CM

## 2024-11-25 DIAGNOSIS — E78.2 MIXED HYPERLIPIDEMIA: ICD-10-CM

## 2024-11-25 DIAGNOSIS — I10 ESSENTIAL HYPERTENSION: ICD-10-CM

## 2024-11-25 DIAGNOSIS — F51.01 PRIMARY INSOMNIA: ICD-10-CM

## 2024-11-25 DIAGNOSIS — R53.83 OTHER FATIGUE: ICD-10-CM

## 2024-11-25 DIAGNOSIS — K52.838 OTHER MICROSCOPIC COLITIS: ICD-10-CM

## 2024-11-25 DIAGNOSIS — F32.9 REACTIVE DEPRESSION: ICD-10-CM

## 2024-11-25 LAB
ALBUMIN SERPL-MCNC: 4.2 G/DL (ref 3.5–5)
ALBUMIN/GLOB SERPL: 1.3 (ref 1.1–2.2)
ALP SERPL-CCNC: 73 U/L (ref 45–117)
ALT SERPL-CCNC: 21 U/L (ref 12–78)
ANION GAP SERPL CALC-SCNC: 5 MMOL/L (ref 2–12)
AST SERPL-CCNC: 17 U/L (ref 15–37)
BILIRUB SERPL-MCNC: 2.6 MG/DL (ref 0.2–1)
BUN SERPL-MCNC: 12 MG/DL (ref 6–20)
BUN/CREAT SERPL: 14 (ref 12–20)
CALCIUM SERPL-MCNC: 9.6 MG/DL (ref 8.5–10.1)
CHLORIDE SERPL-SCNC: 105 MMOL/L (ref 97–108)
CHOLEST SERPL-MCNC: 149 MG/DL
CO2 SERPL-SCNC: 30 MMOL/L (ref 21–32)
CREAT SERPL-MCNC: 0.85 MG/DL (ref 0.55–1.02)
ERYTHROCYTE [DISTWIDTH] IN BLOOD BY AUTOMATED COUNT: 11.9 % (ref 11.5–14.5)
GLOBULIN SER CALC-MCNC: 3.3 G/DL (ref 2–4)
GLUCOSE SERPL-MCNC: 94 MG/DL (ref 65–100)
HCT VFR BLD AUTO: 42.8 % (ref 35–47)
HDLC SERPL-MCNC: 57 MG/DL
HDLC SERPL: 2.6 (ref 0–5)
HGB BLD-MCNC: 14.6 G/DL (ref 11.5–16)
LDLC SERPL CALC-MCNC: 76.4 MG/DL (ref 0–100)
MCH RBC QN AUTO: 31.7 PG (ref 26–34)
MCHC RBC AUTO-ENTMCNC: 34.1 G/DL (ref 30–36.5)
MCV RBC AUTO: 92.8 FL (ref 80–99)
NRBC # BLD: 0 K/UL (ref 0–0.01)
NRBC BLD-RTO: 0 PER 100 WBC
PLATELET # BLD AUTO: 194 K/UL (ref 150–400)
PMV BLD AUTO: 11.2 FL (ref 8.9–12.9)
POTASSIUM SERPL-SCNC: 4.4 MMOL/L (ref 3.5–5.1)
PROT SERPL-MCNC: 7.5 G/DL (ref 6.4–8.2)
RBC # BLD AUTO: 4.61 M/UL (ref 3.8–5.2)
SODIUM SERPL-SCNC: 140 MMOL/L (ref 136–145)
TRIGL SERPL-MCNC: 78 MG/DL
TSH SERPL DL<=0.05 MIU/L-ACNC: 1 UIU/ML (ref 0.36–3.74)
VLDLC SERPL CALC-MCNC: 15.6 MG/DL
WBC # BLD AUTO: 4.4 K/UL (ref 3.6–11)

## 2024-11-25 RX ORDER — FLUOXETINE 10 MG/1
10 CAPSULE ORAL DAILY
Qty: 90 CAPSULE | Refills: 0 | Status: SHIPPED | OUTPATIENT
Start: 2024-11-25 | End: 2025-02-23

## 2024-11-25 ASSESSMENT — ENCOUNTER SYMPTOMS
SHORTNESS OF BREATH: 0
COLOR CHANGE: 0
COUGH: 0
BACK PAIN: 0
SORE THROAT: 0
RHINORRHEA: 0
DIARRHEA: 0
ABDOMINAL PAIN: 0
CHEST TIGHTNESS: 0
EYE DISCHARGE: 0
CONSTIPATION: 0

## 2024-11-25 NOTE — PROGRESS NOTES
Medicare Annual Wellness Visit    Priya Orlando is here for Medicare AWV    Assessment & Plan   Medicare annual wellness visit, subsequent  .Age appropriate Health screening and immunization discussed with patient.         Subjective       Patient's complete Health Risk Assessment and screening values have been reviewed and are found in Flowsheets. The following problems were reviewed today and where indicated follow up appointments were made and/or referrals ordered.    Positive Risk Factor Screenings with Interventions:      Depression:  PHQ-2 Score: 5  PHQ-9 Total Score: 13  Total Score Interpretation: 10-14 = moderate depression    Interventions:  Medications adjusted: Refill given for prozac as symptoms are improving                  Safety:  Do you have non-slip mats or non-slip surfaces or shower bars or grab bars in your shower or bathtub?: (!) No    Interventions:  Patient declined any further interventions or treatment                   Objective   Vitals:    11/25/24 0912   BP: 109/72   Site: Left Upper Arm   Position: Sitting   Pulse: 59   Resp: 16   Temp: 97.5 °F (36.4 °C)   SpO2: 97%   Weight: 58.7 kg (129 lb 6.4 oz)   Height: 1.575 m (5' 2\")      Body mass index is 23.67 kg/m².                  No Known Allergies  Prior to Visit Medications    Medication Sig Taking? Authorizing Provider   FLUoxetine (PROZAC) 10 MG capsule Take 1 capsule by mouth daily Yes Kenyetta Arredondo MD   alendronate (FOSAMAX) 70 MG tablet TAKE 1 TABLET EVERY 7 DAYS Yes Kenyetta Arredondo MD   metoprolol tartrate (LOPRESSOR) 25 MG tablet TAKE 1 TABLET TWICE A DAY Yes Tamia Parra, APRN - NP   atorvastatin (LIPITOR) 10 MG tablet TAKE 1 TABLET DAILY Yes Kenyetta Arredondo MD   calcium carbonate 600 MG TABS tablet Take 1 tablet by mouth daily Yes ProviderWilmer MD   traZODone (DESYREL) 150 MG tablet TAKE 1 TABLET NIGHTLY (NEED APPOINTMENT FOR ADDITIONAL REFILLS FOR CHRONIC MEDICAL CARE MANAGEMENT) Yes Kenyetta Arredondo MD   aspirin

## 2024-11-25 NOTE — PROGRESS NOTES
Health Decision Maker has been checked with the patient   Primary Decision Maker: Dylan Orlando - Spouse - [phone]     Patient has stated that the scribe can come in room    Chief Complaint   Patient presents with    Medicare AWV       \"Have you been to the ER, urgent care clinic since your last visit?  Hospitalized since your last visit?\"    NO    “Have you seen or consulted any other health care providers outside of [facility] since your last visit?”    NO      Vitals:    11/25/24 0912   BP: 109/72   Site: Left Upper Arm   Position: Sitting   Pulse: 59   Resp: 16   Temp: 97.5 °F (36.4 °C)   SpO2: 97%   Weight: 58.7 kg (129 lb 6.4 oz)   Height: 1.575 m (5' 2\")      Depression: At risk (11/18/2024)    PHQ-2     PHQ-2 Score: 13      Have you had a mammogram?”   YES - Where: [facility]  Nurse/CMA to request most recent records if not in the chart    Date of last Mammogram: 10/11/2023   SENT    Click Here for Release of Records Request    Specialist patient sees: Alphonso Caruso    Chart reviewed: immunizations are documented.   Immunization History   Administered Date(s) Administered    COVID-19, MODERNA Bivalent, (age 12y+), IM, 50 mcg/0.5 mL 09/07/2022, 05/04/2023    COVID-19, PFIZER PURPLE top, DILUTE for use, (age 12 y+), 30mcg/0.3mL 03/06/2021, 03/27/2021, 10/22/2021    COVID-19, PFIZER, (age 12y+), IM, 30mcg/0.3mL 09/30/2023, 09/12/2024    Influenza Virus Vaccine 08/16/2017, 09/16/2020, 08/20/2021    Influenza, FLUAD, (age 65 y+), IM, Quadv, 0.5mL 09/16/2020    Influenza, FLUAD, (age 65 y+), IM, Trivalent PF, 0.5mL 10/11/2018, 11/11/2019    Influenza, FLUARIX, FLULAVAL, FLUZONE (age 6 mo+) and AFLURIA, (age 3 y+), Quadv PF, 0.5mL 10/08/2014    Influenza, FLUZONE High Dose (age 65 y+), IM, Quadv, 0.7mL 09/20/2023, 09/12/2024    Influenza, FLUZONE High Dose, (age 65 y+), IM, Trivalent PF, 0.5mL 11/11/2019, 09/23/2020    Pneumococcal, PCV-13, PREVNAR 13, (age 6w+), IM, 0.5mL 08/16/2017

## 2024-11-25 NOTE — PROGRESS NOTES
Priya Orlando (:  1950) is a 74 y.o. female, Established patient, here for evaluation of the following chief complaint(s):  Medicare AWV      ASSESSMENT/PLAN:  1. Reactive depression  -     FLUoxetine (PROZAC) 10 MG capsule; Take 1 capsule by mouth daily, Disp-90 capsule, R-0Normal sent to pharmacy.  I refilled Prozac 10 mg to continue taking daily. I provided her a link to [employer] that she can use to find a therapist. I informed her of the benefits exercise can have for her depression.    2. Essential hypertension  -     CBC; Future  -     Comprehensive Metabolic Panel; Future  I ordered a CBC and a CMP.  BP is well-controlled on current medication. No change to dosage at this time. She is followed by Dr. Chavez (Cardiology).    3. PSVT (paroxysmal supraventricular tachycardia) ([facility])  She is followed by Dr. Chavez (Cardiology). I recommend that she continue taking metoprolol 25 mg BID, aspirin 81 mg daily, and atorvastatin 10 mg daily.    4. Primary insomnia  I recommend that she continue taking trazodone nightly at either the 100 mg or 150 mg dosage depending on what she feels like she needs.     5. Other fatigue  -     TSH; Future  I ordered blood work to measure TSH levels.     6. Mixed hyperlipidemia  -     Lipid Panel; Future  I ordered a lipid panel. I recommend that she continue taking atorvastatin 10 mg daily.    7. Other microscopic colitis  She is followed by Gastroenterology.         Subjective   SUBJECTIVE/OBJECTIVE:  HPI    Patient presents today for a follow-up on chronic conditions and a MWV. She is fasting for cholesterol labs.    She takes Prozac 10 mg daily for depression and finds it effective at this dosage. She also notes that her sleep has improved since starting Prozac. She also takes trazodone nightly at either the 100 mg or 150 mg dosage depending on what she feels like she needs. Her palpitations have resolved. She is exercising. She has been unable to get an appointment

## 2024-11-26 DIAGNOSIS — F51.01 PRIMARY INSOMNIA: Primary | ICD-10-CM

## 2024-11-26 DIAGNOSIS — E80.6 ACQUIRED HYPERBILIRUBINEMIA: Primary | ICD-10-CM

## 2024-11-26 LAB
BILIRUB DIRECT SERPL-MCNC: 0.4 MG/DL (ref 0–0.2)
BILIRUB INDIRECT SERPL-MCNC: 2.1 MG/DL (ref 0–1.1)
BILIRUB SERPL-MCNC: 2.5 MG/DL (ref 0.2–1)

## 2024-11-26 RX ORDER — TRAZODONE HYDROCHLORIDE 150 MG/1
TABLET ORAL
Qty: 90 TABLET | Refills: 1 | Status: SHIPPED | OUTPATIENT
Start: 2024-11-26

## 2025-01-14 ENCOUNTER — HOSPITAL ENCOUNTER (OUTPATIENT)
Facility: HOSPITAL | Age: 75
Discharge: HOME | End: 2025-01-17

## 2025-01-14 ENCOUNTER — HOSPITAL ENCOUNTER (OUTPATIENT)
Facility: HOSPITAL | Age: 75
Discharge: HOME | End: 2025-01-17
Payer: MEDICARE

## 2025-01-14 DIAGNOSIS — R17 CHOLEMIA: ICD-10-CM

## 2025-01-14 DIAGNOSIS — K52.839 MICROSCOPIC COLITIS, UNSPECIFIED MICROSCOPIC COLITIS TYPE: ICD-10-CM

## 2025-01-14 LAB
BASOPHILS # BLD: 0.04 K/UL (ref 0–0.1)
BASOPHILS NFR BLD: 0.9 % (ref 0–1)
DIFFERENTIAL METHOD BLD: NORMAL
EOSINOPHIL # BLD: 0.21 K/UL (ref 0–0.4)
EOSINOPHIL NFR BLD: 4.6 % (ref 0–7)
ERYTHROCYTE [DISTWIDTH] IN BLOOD BY AUTOMATED COUNT: 11.9 % (ref 11.5–14.5)
HCT VFR BLD AUTO: 42.1 % (ref 35–47)
HGB BLD-MCNC: 14.5 G/DL (ref 11.5–16)
IMM GRANULOCYTES # BLD AUTO: 0.01 K/UL (ref 0–0.04)
IMM GRANULOCYTES NFR BLD AUTO: 0.2 % (ref 0–0.5)
LYMPHOCYTES # BLD: 1.49 K/UL (ref 0.8–3.5)
LYMPHOCYTES NFR BLD: 32.5 % (ref 12–49)
MCH RBC QN AUTO: 31.3 PG (ref 26–34)
MCHC RBC AUTO-ENTMCNC: 34.4 G/DL (ref 30–36.5)
MCV RBC AUTO: 90.9 FL (ref 80–99)
MONOCYTES # BLD: 0.51 K/UL (ref 0–1)
MONOCYTES NFR BLD: 11.1 % (ref 5–13)
NEUTS SEG # BLD: 2.32 K/UL (ref 1.8–8)
NEUTS SEG NFR BLD: 50.7 % (ref 32–75)
NRBC # BLD: 0 K/UL (ref 0–0.01)
NRBC BLD-RTO: 0 PER 100 WBC
PLATELET # BLD AUTO: 207 K/UL (ref 150–400)
PMV BLD AUTO: 10.8 FL (ref 8.9–12.9)
RBC # BLD AUTO: 4.63 M/UL (ref 3.8–5.2)
WBC # BLD AUTO: 4.6 K/UL (ref 3.6–11)

## 2025-01-14 PROCEDURE — 76705 ECHO EXAM OF ABDOMEN: CPT

## 2025-01-15 LAB
ALBUMIN SERPL-MCNC: 4.2 G/DL (ref 3.5–5)
ALBUMIN/GLOB SERPL: 1.2 (ref 1.1–2.2)
ALP SERPL-CCNC: 74 U/L (ref 45–117)
ALT SERPL-CCNC: 20 U/L (ref 12–78)
ANION GAP SERPL CALC-SCNC: 6 MMOL/L (ref 2–12)
AST SERPL-CCNC: 22 U/L (ref 15–37)
BILIRUB SERPL-MCNC: 1.4 MG/DL (ref 0.2–1)
BUN SERPL-MCNC: 10 MG/DL (ref 6–20)
BUN/CREAT SERPL: 13 (ref 12–20)
CALCIUM SERPL-MCNC: 9.7 MG/DL (ref 8.5–10.1)
CHLORIDE SERPL-SCNC: 102 MMOL/L (ref 97–108)
CO2 SERPL-SCNC: 30 MMOL/L (ref 21–32)
CREAT SERPL-MCNC: 0.77 MG/DL (ref 0.55–1.02)
GLOBULIN SER CALC-MCNC: 3.4 G/DL (ref 2–4)
GLUCOSE SERPL-MCNC: 92 MG/DL (ref 65–100)
POTASSIUM SERPL-SCNC: 4.5 MMOL/L (ref 3.5–5.1)
PROT SERPL-MCNC: 7.6 G/DL (ref 6.4–8.2)
SODIUM SERPL-SCNC: 138 MMOL/L (ref 136–145)

## 2025-01-26 SDOH — ECONOMIC STABILITY: INCOME INSECURITY: IN THE LAST 12 MONTHS, WAS THERE A TIME WHEN YOU WERE NOT ABLE TO PAY THE MORTGAGE OR RENT ON TIME?: NO

## 2025-01-26 SDOH — ECONOMIC STABILITY: FOOD INSECURITY: WITHIN THE PAST 12 MONTHS, THE FOOD YOU BOUGHT JUST DIDN'T LAST AND YOU DIDN'T HAVE MONEY TO GET MORE.: NEVER TRUE

## 2025-01-26 SDOH — ECONOMIC STABILITY: FOOD INSECURITY: WITHIN THE PAST 12 MONTHS, YOU WORRIED THAT YOUR FOOD WOULD RUN OUT BEFORE YOU GOT MONEY TO BUY MORE.: NEVER TRUE

## 2025-01-26 SDOH — ECONOMIC STABILITY: TRANSPORTATION INSECURITY
IN THE PAST 12 MONTHS, HAS THE LACK OF TRANSPORTATION KEPT YOU FROM MEDICAL APPOINTMENTS OR FROM GETTING MEDICATIONS?: NO

## 2025-01-29 ENCOUNTER — OFFICE VISIT (OUTPATIENT)
Dept: PRIMARY CARE CLINIC | Facility: CLINIC | Age: 75
End: 2025-01-29
Payer: MEDICARE

## 2025-01-29 VITALS
HEIGHT: 62 IN | OXYGEN SATURATION: 97 % | HEART RATE: 63 BPM | WEIGHT: 133 LBS | BODY MASS INDEX: 24.48 KG/M2 | SYSTOLIC BLOOD PRESSURE: 120 MMHG | DIASTOLIC BLOOD PRESSURE: 73 MMHG | RESPIRATION RATE: 18 BRPM | TEMPERATURE: 97 F

## 2025-01-29 DIAGNOSIS — R53.83 FATIGUE, UNSPECIFIED TYPE: ICD-10-CM

## 2025-01-29 DIAGNOSIS — E80.6 HYPERBILIRUBINEMIA: Primary | ICD-10-CM

## 2025-01-29 DIAGNOSIS — F32.89 OTHER DEPRESSION: ICD-10-CM

## 2025-01-29 PROCEDURE — 1090F PRES/ABSN URINE INCON ASSESS: CPT | Performed by: INTERNAL MEDICINE

## 2025-01-29 PROCEDURE — 1159F MED LIST DOCD IN RCRD: CPT | Performed by: INTERNAL MEDICINE

## 2025-01-29 PROCEDURE — G8420 CALC BMI NORM PARAMETERS: HCPCS | Performed by: INTERNAL MEDICINE

## 2025-01-29 PROCEDURE — 3074F SYST BP LT 130 MM HG: CPT | Performed by: INTERNAL MEDICINE

## 2025-01-29 PROCEDURE — G8399 PT W/DXA RESULTS DOCUMENT: HCPCS | Performed by: INTERNAL MEDICINE

## 2025-01-29 PROCEDURE — 1160F RVW MEDS BY RX/DR IN RCRD: CPT | Performed by: INTERNAL MEDICINE

## 2025-01-29 PROCEDURE — 1036F TOBACCO NON-USER: CPT | Performed by: INTERNAL MEDICINE

## 2025-01-29 PROCEDURE — 3017F COLORECTAL CA SCREEN DOC REV: CPT | Performed by: INTERNAL MEDICINE

## 2025-01-29 PROCEDURE — 1126F AMNT PAIN NOTED NONE PRSNT: CPT | Performed by: INTERNAL MEDICINE

## 2025-01-29 PROCEDURE — 3078F DIAST BP <80 MM HG: CPT | Performed by: INTERNAL MEDICINE

## 2025-01-29 PROCEDURE — 1123F ACP DISCUSS/DSCN MKR DOCD: CPT | Performed by: INTERNAL MEDICINE

## 2025-01-29 PROCEDURE — 99214 OFFICE O/P EST MOD 30 MIN: CPT | Performed by: INTERNAL MEDICINE

## 2025-01-29 PROCEDURE — G8427 DOCREV CUR MEDS BY ELIG CLIN: HCPCS | Performed by: INTERNAL MEDICINE

## 2025-01-29 ASSESSMENT — PATIENT HEALTH QUESTIONNAIRE - PHQ9
SUM OF ALL RESPONSES TO PHQ QUESTIONS 1-9: 2
SUM OF ALL RESPONSES TO PHQ QUESTIONS 1-9: 2
1. LITTLE INTEREST OR PLEASURE IN DOING THINGS: SEVERAL DAYS
SUM OF ALL RESPONSES TO PHQ QUESTIONS 1-9: 2
SUM OF ALL RESPONSES TO PHQ9 QUESTIONS 1 & 2: 2
2. FEELING DOWN, DEPRESSED OR HOPELESS: SEVERAL DAYS
SUM OF ALL RESPONSES TO PHQ QUESTIONS 1-9: 2

## 2025-01-29 NOTE — PROGRESS NOTES
\"Have you been to the ER, urgent care clinic since your last visit?  Hospitalized since your last visit?\"    NO      “Have you seen or consulted any other health care providers outside our system since your last visit?”    GI  Eye surgery - laser to get film off of eye.      Chief Complaint   Patient presents with    Follow-up     On lab work and ultrasound.        Pt is ok with scribe.  Pt is ok with NP shadowing.   
TABLET NIGHTLY (NEED APPOINTMENT FOR ADDITIONAL REFILLS FOR CHRONIC MEDICAL CARE MANAGEMENT) 90 tablet 1    FLUoxetine (PROZAC) 10 MG capsule Take 1 capsule by mouth daily 90 capsule 0    alendronate (FOSAMAX) 70 MG tablet TAKE 1 TABLET EVERY 7 DAYS 12 tablet 3    metoprolol tartrate (LOPRESSOR) 25 MG tablet TAKE 1 TABLET TWICE A  tablet 3    atorvastatin (LIPITOR) 10 MG tablet TAKE 1 TABLET DAILY 90 tablet 1    calcium carbonate 600 MG TABS tablet Take 1 tablet by mouth daily      vitamin D 25 MCG (1000 UT) CAPS Take 1 tablet by mouth daily      aspirin 81 MG EC tablet Take by mouth daily (Patient not taking: Reported on 2025)       No current facility-administered medications for this visit.     Past Medical History:   Diagnosis Date    Anxiety 10/01/24    Arthritis     Depression 10/01/24    Mild depression ongoing, but it has dramatically increased recently    GERD (gastroesophageal reflux disease) 2024    Microscopic colitis    Hypercholesterolemia     Microscopic colitis     Osteoarthritis     Sun-damaged skin     Sunburn, blistering      Past Surgical History:   Procedure Laterality Date    CATARACT REMOVAL Bilateral      SECTION      COLONOSCOPY  3/7/2012/    repeat in 5 years due family hx    GYN          ORTHOPEDIC SURGERY      rotator cuff tear       Physical Exam    Orders Only on 2025   Component Date Value Ref Range Status    Sodium 2025 138  136 - 145 mmol/L Final    Potassium 2025 4.5  3.5 - 5.1 mmol/L Final    Chloride 2025 102  97 - 108 mmol/L Final    CO2 2025 30  21 - 32 mmol/L Final    Anion Gap 2025 6  2 - 12 mmol/L Final    PLEASE NOTE NEW REFERENCE RANGE    Glucose 2025 92  65 - 100 mg/dL Final    BUN 2025 10  6 - 20 MG/DL Final    Creatinine 2025 0.77  0.55 - 1.02 MG/DL Final    BUN/Creatinine Ratio 2025 13  12 - 20   Final    Est, Glom Filt Rate 2025 81  >60 ml/min/1.73m2 Final

## 2025-02-05 DIAGNOSIS — E78.5 HYPERLIPIDEMIA, UNSPECIFIED HYPERLIPIDEMIA TYPE: ICD-10-CM

## 2025-02-05 RX ORDER — ATORVASTATIN CALCIUM 10 MG/1
TABLET, FILM COATED ORAL
Qty: 90 TABLET | Refills: 0 | Status: SHIPPED | OUTPATIENT
Start: 2025-02-05

## 2025-02-28 LAB
ALT SERPL-CCNC: 21 U/L (ref 12–78)
AST SERPL-CCNC: 19 U/L (ref 15–37)
BILIRUB DIRECT SERPL-MCNC: 0.3 MG/DL (ref 0–0.2)
BILIRUB SERPL-MCNC: 1.8 MG/DL (ref 0.2–1)

## 2025-03-17 LAB
-: NORMAL
FERRITIN SERPL-MCNC: 23 NG/ML (ref 8–252)
GGT SERPL-CCNC: 16 U/L (ref 5–55)
HAV IGM SER QL: NONREACTIVE
HBV CORE IGM SER QL: NONREACTIVE
HBV SURFACE AG SER QL: <0.1 INDEX
HBV SURFACE AG SER QL: NEGATIVE
HCV AB SER IA-ACNC: 0.08 INDEX
HCV AB SERPL QL IA: NONREACTIVE
INR PPP: 1 (ref 0.9–1.1)
IRON SATN MFR SERPL: 21 % (ref 20–50)
IRON SERPL-MCNC: 75 UG/DL (ref 35–150)
PROTHROMBIN TIME: 10.5 SEC (ref 9.2–11.2)
TIBC SERPL-MCNC: 351 UG/DL (ref 250–450)

## 2025-03-18 LAB
MITOCHONDRIA M2 IGG SER-ACNC: <20 UNITS (ref 0–20)
SMA IGG SER-ACNC: 5 UNITS (ref 0–19)
TTG IGA SER-ACNC: <2 U/ML (ref 0–3)
TTG IGG SER-ACNC: 4 U/ML (ref 0–5)

## 2025-03-19 LAB — CERULOPLASMIN SERPL-MCNC: 27.8 MG/DL (ref 19–39)

## 2025-03-20 LAB
Lab: NORMAL
Lab: NORMAL
REFERENCE LAB: NORMAL

## 2025-03-23 LAB
A1AT PHENOTYP SERPL IFE: NORMAL
A1AT SERPL-MCNC: 144 MG/DL (ref 101–187)

## 2025-04-06 DIAGNOSIS — F32.89 OTHER DEPRESSION: ICD-10-CM

## 2025-04-13 ENCOUNTER — PATIENT MESSAGE (OUTPATIENT)
Dept: PRIMARY CARE CLINIC | Facility: CLINIC | Age: 75
End: 2025-04-13

## 2025-04-13 DIAGNOSIS — F32.89 OTHER DEPRESSION: ICD-10-CM

## 2025-05-06 DIAGNOSIS — E78.5 HYPERLIPIDEMIA, UNSPECIFIED HYPERLIPIDEMIA TYPE: ICD-10-CM

## 2025-05-06 RX ORDER — ATORVASTATIN CALCIUM 10 MG/1
10 TABLET, FILM COATED ORAL DAILY
Qty: 90 TABLET | Refills: 0 | Status: SHIPPED | OUTPATIENT
Start: 2025-05-06

## 2025-05-28 PROBLEM — M43.16 SPONDYLOLISTHESIS AT L4-L5 LEVEL: Status: ACTIVE | Noted: 2025-05-23

## 2025-05-28 PROBLEM — M54.41 ACUTE BACK PAIN WITH SCIATICA, RIGHT: Status: ACTIVE | Noted: 2025-05-23

## 2025-05-28 PROBLEM — M48.061 STENOSIS, SPINAL, LUMBAR: Status: ACTIVE | Noted: 2025-05-23

## 2025-06-13 ENCOUNTER — TELEPHONE (OUTPATIENT)
Age: 75
End: 2025-06-13

## 2025-06-13 NOTE — TELEPHONE ENCOUNTER
Called and LVM to call back to r/s appt with Dr. CIFUENTES; Patient has the option to do the ECHO on 07/08/25 and come back for an appt with Dr. Sheikh or Dr. Cota ONLY or have both ECHO and dr appt on the same day further out. Dr. Sheikh has availability on 08/05/25

## 2025-06-16 ENCOUNTER — TRANSCRIBE ORDERS (OUTPATIENT)
Facility: HOSPITAL | Age: 75
End: 2025-06-16

## 2025-06-16 DIAGNOSIS — K52.838 OTHER MICROSCOPIC COLITIS: Primary | ICD-10-CM

## 2025-06-16 DIAGNOSIS — R19.7 DIARRHEA, UNSPECIFIED TYPE: ICD-10-CM

## 2025-06-16 PROBLEM — M48.061 LUMBAR SPINAL STENOSIS: Status: ACTIVE | Noted: 2025-06-16

## 2025-06-16 PROBLEM — M43.16 SPONDYLOLISTHESIS OF LUMBAR REGION: Status: ACTIVE | Noted: 2025-06-16

## 2025-06-19 ENCOUNTER — HOSPITAL ENCOUNTER (OUTPATIENT)
Facility: HOSPITAL | Age: 75
Discharge: HOME | End: 2025-06-22
Payer: MEDICARE

## 2025-06-19 DIAGNOSIS — K52.838 OTHER MICROSCOPIC COLITIS: ICD-10-CM

## 2025-06-19 DIAGNOSIS — R19.7 DIARRHEA, UNSPECIFIED TYPE: ICD-10-CM

## 2025-06-19 PROCEDURE — 74018 RADEX ABDOMEN 1 VIEW: CPT

## 2025-07-23 ENCOUNTER — HOSPITAL ENCOUNTER (OUTPATIENT)
Facility: HOSPITAL | Age: 75
Discharge: HOME | End: 2025-07-26
Payer: MEDICARE

## 2025-07-23 VITALS
WEIGHT: 128.6 LBS | HEART RATE: 56 BPM | BODY MASS INDEX: 23.66 KG/M2 | TEMPERATURE: 98 F | DIASTOLIC BLOOD PRESSURE: 72 MMHG | RESPIRATION RATE: 16 BRPM | SYSTOLIC BLOOD PRESSURE: 121 MMHG | HEIGHT: 62 IN | OXYGEN SATURATION: 95 %

## 2025-07-23 LAB
ABO + RH BLD: NORMAL
ANION GAP SERPL CALC-SCNC: 7 MMOL/L (ref 2–12)
APPEARANCE UR: CLEAR
BACTERIA URNS QL MICRO: NEGATIVE /HPF
BILIRUB UR QL: NEGATIVE
BLOOD GROUP ANTIBODIES SERPL: NORMAL
BUN SERPL-MCNC: 8 MG/DL (ref 6–20)
BUN/CREAT SERPL: 10 (ref 12–20)
CALCIUM SERPL-MCNC: 8.9 MG/DL (ref 8.5–10.1)
CHLORIDE SERPL-SCNC: 101 MMOL/L (ref 97–108)
CO2 SERPL-SCNC: 28 MMOL/L (ref 21–32)
COLOR UR: ABNORMAL
CREAT SERPL-MCNC: 0.78 MG/DL (ref 0.55–1.02)
EPITH CASTS URNS QL MICRO: ABNORMAL /LPF
ERYTHROCYTE [DISTWIDTH] IN BLOOD BY AUTOMATED COUNT: 12.1 % (ref 11.5–14.5)
EST. AVERAGE GLUCOSE BLD GHB EST-MCNC: 100 MG/DL
GLUCOSE SERPL-MCNC: 112 MG/DL (ref 65–100)
GLUCOSE UR STRIP.AUTO-MCNC: NEGATIVE MG/DL
HBA1C MFR BLD: 5.1 % (ref 4–5.6)
HCT VFR BLD AUTO: 42 % (ref 35–47)
HGB BLD-MCNC: 13.7 G/DL (ref 11.5–16)
HGB UR QL STRIP: ABNORMAL
HYALINE CASTS URNS QL MICRO: ABNORMAL /LPF (ref 0–5)
INR PPP: 1 (ref 0.9–1.1)
KETONES UR QL STRIP.AUTO: NEGATIVE MG/DL
LEUKOCYTE ESTERASE UR QL STRIP.AUTO: ABNORMAL
MCH RBC QN AUTO: 31.4 PG (ref 26–34)
MCHC RBC AUTO-ENTMCNC: 32.6 G/DL (ref 30–36.5)
MCV RBC AUTO: 96.3 FL (ref 80–99)
NITRITE UR QL STRIP.AUTO: NEGATIVE
NRBC # BLD: 0 K/UL (ref 0–0.01)
NRBC BLD-RTO: 0 PER 100 WBC
PH UR STRIP: 6.5 (ref 5–8)
PLATELET # BLD AUTO: 187 K/UL (ref 150–400)
PMV BLD AUTO: 10.6 FL (ref 8.9–12.9)
POTASSIUM SERPL-SCNC: 3.7 MMOL/L (ref 3.5–5.1)
PROT UR STRIP-MCNC: NEGATIVE MG/DL
PROTHROMBIN TIME: 10.7 SEC (ref 9.2–11.2)
RBC # BLD AUTO: 4.36 M/UL (ref 3.8–5.2)
RBC #/AREA URNS HPF: ABNORMAL /HPF (ref 0–5)
SODIUM SERPL-SCNC: 136 MMOL/L (ref 136–145)
SP GR UR REFRACTOMETRY: 1.01 (ref 1–1.03)
SPECIMEN EXP DATE BLD: NORMAL
URINE CULTURE IF INDICATED: ABNORMAL
UROBILINOGEN UR QL STRIP.AUTO: 0.2 EU/DL (ref 0.2–1)
WBC # BLD AUTO: 5.3 K/UL (ref 3.6–11)
WBC URNS QL MICRO: ABNORMAL /HPF (ref 0–4)

## 2025-07-23 PROCEDURE — 83036 HEMOGLOBIN GLYCOSYLATED A1C: CPT

## 2025-07-23 PROCEDURE — 86850 RBC ANTIBODY SCREEN: CPT

## 2025-07-23 PROCEDURE — 85027 COMPLETE CBC AUTOMATED: CPT

## 2025-07-23 PROCEDURE — 93005 ELECTROCARDIOGRAM TRACING: CPT | Performed by: ORTHOPAEDIC SURGERY

## 2025-07-23 PROCEDURE — 86901 BLOOD TYPING SEROLOGIC RH(D): CPT

## 2025-07-23 PROCEDURE — 85610 PROTHROMBIN TIME: CPT

## 2025-07-23 PROCEDURE — 86900 BLOOD TYPING SEROLOGIC ABO: CPT

## 2025-07-23 PROCEDURE — 81001 URINALYSIS AUTO W/SCOPE: CPT

## 2025-07-23 PROCEDURE — 80048 BASIC METABOLIC PNL TOTAL CA: CPT

## 2025-07-24 LAB
BACTERIA SPEC CULT: NORMAL
BACTERIA SPEC CULT: NORMAL
SERVICE CMNT-IMP: NORMAL

## 2025-07-24 NOTE — PERIOP NOTE
CHG CLEANSER PROVIDED ALONG WITH INSTRUCTIONS FOR USE.    NILDA PACKETS PROVIDED ALONG WITH INSTRUCTIONS FOR USE.    PT HAS APPT WITH PCP [facility] FOR PREOP CLEARANCE ON 07/25/25  
Pre-Operative Nutrition Instructions:    Following this nutritional protocol will help optimize your nutritional status prior to surgery. This will decrease the risk of post-operative complications and promote wound healing, which is vital for recovery.    You will need to purchase:  Gatorade (not G2) - TWO 28 oz bottles          PRE-OPERATIVE INSTRUCTIONS    Beginning 5 days prior to your surgery, you will drink 1 prepared Bean packet TWICE daily at least 3 hours apart.     Use this schedule to keep track of your Bean packets:    Days BEFORE Surgery 1st Bean 2nd Bean   5 [] []   4 [] []   3 [] []   2 [] []   1 [] []     []  The night before surgery, you will drink one 28 oz bottle of Gatorade over 20 minutes or less.       []  The morning of surgery, drink half of the other bottle of Gatorade, or 14 oz, over 10 minutes. This should be completed 1 hour before arrival time to the hospital.       NOTE: **TYPE 1 DIABETIC PATIENTS DO NOT DRINK GATORADE PRIOR TO SURGERY**    **SEE POST OPERATIVE INSTRUCTIONS ON PAGE 2**                                  POST-OPERATIVE INSTRUCTIONS    Starting the day of your discharge from the hospital, you will drink 1 prepared Bean packet, TWICE daily at least 3 hours apart.    Use this schedule to keep track of your Bean:    Days AFTER surgery 1st Bean  2nd Bean   1 [] []   2 [] []   3 [] []   4 [] []   5 [] []   6 [] []   7 [] []   8 [] []   9 [] []   10 [] []             
(ALENDRONATE)    (Pain medications not listed above, including Tylenol may be taken up until 4 hours prior to arrival time)   Blood  Thinners If you take Aspirin, Eliquis, Plavix, Coumadin, or any blood-thinning or anti-blood clot medicine, talk to the doctor who prescribed the medications for pre-operative instructions.    If you take aspirin or aspirin containing products for pain, stop 7 days prior to surgery   Bathing Clothing  Jewelry  Valuables     When you shower the morning of surgery, please do not apply anything to your skin such as lotions, powders,  or makeup, (especially mascara).     Remove fingernail polish except for clear.    Do not shave or trim anywhere 24 hours before surgery    Wear your hair loose or down; no ponytails, buns, or metal hair clips    Wear loose, comfortable, clean clothes    Wear glasses instead of contacts. Bring a case to keep your glasses safe.    Leave money, valuables, and jewelry, including body piercings, at home    If you use inhalers or CPAP machine, bring it with you the day of surgery.     Going Home - or Spending the Night OUTPATIENT SURGERY: You must have a responsible adult drive you home and stay with you 24 hours after surgery. You may not drive for 24 hours after surgery.    ADMITS: If your doctor is keeping you in the hospital after surgery, leave personal belongings/luggage in your car until you have a hospital room number.    Hospital discharge time is 12 noon  Drivers must be here before 12 noon unless you are told differently   Special Instructions Free  parking is available from 6 AM until 4:30 PM.         Preventing Infections Before and After - Your Surgery    IMPORTANT INSTRUCTIONS      You play an important role in your health and preparation for surgery. To reduce the germs on your skin you will need to shower with CHG soap (Chorhexidine gluconate 4%) two times before surgery.    CHG soap (Hibiclens, Hex-A-Clens or store brand)  CHG soap will be 
PRESENT    Final    Culture 07/23/2025     Final                    Value:Screening of patient nares for MRSA is for surveillance purposes and, if positive, to facilitate isolation considerations in high risk settings. It is not intended for automatic decolonization interventions per se as regimens are not sufficiently effective to warrant routine use.      WBC 07/23/2025 5.3  3.6 - 11.0 K/uL Final    RBC 07/23/2025 4.36  3.80 - 5.20 M/uL Final    Hemoglobin 07/23/2025 13.7  11.5 - 16.0 g/dL Final    Hematocrit 07/23/2025 42.0  35.0 - 47.0 % Final    MCV 07/23/2025 96.3  80.0 - 99.0 FL Final    MCH 07/23/2025 31.4  26.0 - 34.0 PG Final    MCHC 07/23/2025 32.6  30.0 - 36.5 g/dL Final    RDW 07/23/2025 12.1  11.5 - 14.5 % Final    Platelets 07/23/2025 187  150 - 400 K/uL Final    MPV 07/23/2025 10.6  8.9 - 12.9 FL Final    Nucleated RBCs 07/23/2025 0.0  0  WBC Final    nRBC 07/23/2025 0.00  0.00 - 0.01 K/uL Final    Sodium 07/23/2025 136  136 - 145 mmol/L Final    Potassium 07/23/2025 3.7  3.5 - 5.1 mmol/L Final    Chloride 07/23/2025 101  97 - 108 mmol/L Final    CO2 07/23/2025 28  21 - 32 mmol/L Final    Anion Gap 07/23/2025 7  2 - 12 mmol/L Final    PLEASE NOTE NEW REFERENCE RANGE    Glucose 07/23/2025 112 (H)  65 - 100 mg/dL Final    BUN 07/23/2025 8  6 - 20 MG/DL Final    Creatinine 07/23/2025 0.78  0.55 - 1.02 MG/DL Final    BUN/Creatinine Ratio 07/23/2025 10 (L)  12 - 20   Final    Est, Glom Filt Rate 07/23/2025 80  >60 ml/min/1.73m2 Final    Comment:    Pediatric calculator link: https://www.kidney.org/professionals/kdoqi/gfr_calculatorped     These results are not intended for use in patients <18 years of age.     eGFR results are calculated without a race factor using  the 2021 CKD-EPI equation. Careful clinical correlation is recommended, particularly when comparing to results calculated using previous equations.  The CKD-EPI equation is less accurate in patients with extremes of muscle mass,

## 2025-07-25 ENCOUNTER — OFFICE VISIT (OUTPATIENT)
Dept: PRIMARY CARE CLINIC | Facility: CLINIC | Age: 75
End: 2025-07-25
Payer: MEDICARE

## 2025-07-25 VITALS
RESPIRATION RATE: 18 BRPM | TEMPERATURE: 97 F | HEIGHT: 62 IN | OXYGEN SATURATION: 97 % | HEART RATE: 58 BPM | BODY MASS INDEX: 24.11 KG/M2 | SYSTOLIC BLOOD PRESSURE: 119 MMHG | DIASTOLIC BLOOD PRESSURE: 73 MMHG | WEIGHT: 131 LBS

## 2025-07-25 DIAGNOSIS — R45.89 ANXIETY ABOUT TREATMENT: ICD-10-CM

## 2025-07-25 DIAGNOSIS — F51.01 PRIMARY INSOMNIA: ICD-10-CM

## 2025-07-25 DIAGNOSIS — M48.061 DEGENERATIVE LUMBAR SPINAL STENOSIS: Primary | ICD-10-CM

## 2025-07-25 DIAGNOSIS — I47.10 PSVT (PAROXYSMAL SUPRAVENTRICULAR TACHYCARDIA): ICD-10-CM

## 2025-07-25 DIAGNOSIS — Z01.818 PRE-OP EXAM: ICD-10-CM

## 2025-07-25 DIAGNOSIS — F32.89 OTHER DEPRESSION: ICD-10-CM

## 2025-07-25 DIAGNOSIS — K52.838 OTHER MICROSCOPIC COLITIS: ICD-10-CM

## 2025-07-25 PROBLEM — K52.9 COLITIS: Status: ACTIVE | Noted: 2025-07-25

## 2025-07-25 PROCEDURE — 1160F RVW MEDS BY RX/DR IN RCRD: CPT | Performed by: INTERNAL MEDICINE

## 2025-07-25 PROCEDURE — 3074F SYST BP LT 130 MM HG: CPT | Performed by: INTERNAL MEDICINE

## 2025-07-25 PROCEDURE — 1123F ACP DISCUSS/DSCN MKR DOCD: CPT | Performed by: INTERNAL MEDICINE

## 2025-07-25 PROCEDURE — 99214 OFFICE O/P EST MOD 30 MIN: CPT | Performed by: INTERNAL MEDICINE

## 2025-07-25 PROCEDURE — G8420 CALC BMI NORM PARAMETERS: HCPCS | Performed by: INTERNAL MEDICINE

## 2025-07-25 PROCEDURE — 3017F COLORECTAL CA SCREEN DOC REV: CPT | Performed by: INTERNAL MEDICINE

## 2025-07-25 PROCEDURE — 3078F DIAST BP <80 MM HG: CPT | Performed by: INTERNAL MEDICINE

## 2025-07-25 PROCEDURE — 1126F AMNT PAIN NOTED NONE PRSNT: CPT | Performed by: INTERNAL MEDICINE

## 2025-07-25 PROCEDURE — G8399 PT W/DXA RESULTS DOCUMENT: HCPCS | Performed by: INTERNAL MEDICINE

## 2025-07-25 PROCEDURE — 1090F PRES/ABSN URINE INCON ASSESS: CPT | Performed by: INTERNAL MEDICINE

## 2025-07-25 PROCEDURE — 1036F TOBACCO NON-USER: CPT | Performed by: INTERNAL MEDICINE

## 2025-07-25 PROCEDURE — 1159F MED LIST DOCD IN RCRD: CPT | Performed by: INTERNAL MEDICINE

## 2025-07-25 PROCEDURE — G8427 DOCREV CUR MEDS BY ELIG CLIN: HCPCS | Performed by: INTERNAL MEDICINE

## 2025-07-25 RX ORDER — ALPRAZOLAM 0.5 MG
0.5 TABLET ORAL NIGHTLY PRN
Qty: 5 TABLET | Refills: 0 | Status: SHIPPED | OUTPATIENT
Start: 2025-07-25 | End: 2025-08-07

## 2025-07-25 RX ORDER — BUDESONIDE 3 MG/1
3 CAPSULE, COATED PELLETS ORAL EVERY MORNING
COMMUNITY
Start: 2025-06-12

## 2025-07-25 ASSESSMENT — PATIENT HEALTH QUESTIONNAIRE - PHQ9
3. TROUBLE FALLING OR STAYING ASLEEP: SEVERAL DAYS
7. TROUBLE CONCENTRATING ON THINGS, SUCH AS READING THE NEWSPAPER OR WATCHING TELEVISION: NOT AT ALL
10. IF YOU CHECKED OFF ANY PROBLEMS, HOW DIFFICULT HAVE THESE PROBLEMS MADE IT FOR YOU TO DO YOUR WORK, TAKE CARE OF THINGS AT HOME, OR GET ALONG WITH OTHER PEOPLE: VERY DIFFICULT
9. THOUGHTS THAT YOU WOULD BE BETTER OFF DEAD, OR OF HURTING YOURSELF: NOT AT ALL
SUM OF ALL RESPONSES TO PHQ QUESTIONS 1-9: 10
SUM OF ALL RESPONSES TO PHQ QUESTIONS 1-9: 10
6. FEELING BAD ABOUT YOURSELF - OR THAT YOU ARE A FAILURE OR HAVE LET YOURSELF OR YOUR FAMILY DOWN: SEVERAL DAYS
SUM OF ALL RESPONSES TO PHQ QUESTIONS 1-9: 10
5. POOR APPETITE OR OVEREATING: NOT AT ALL
8. MOVING OR SPEAKING SO SLOWLY THAT OTHER PEOPLE COULD HAVE NOTICED. OR THE OPPOSITE, BEING SO FIGETY OR RESTLESS THAT YOU HAVE BEEN MOVING AROUND A LOT MORE THAN USUAL: NOT AT ALL
1. LITTLE INTEREST OR PLEASURE IN DOING THINGS: NEARLY EVERY DAY
2. FEELING DOWN, DEPRESSED OR HOPELESS: NEARLY EVERY DAY
SUM OF ALL RESPONSES TO PHQ QUESTIONS 1-9: 10
4. FEELING TIRED OR HAVING LITTLE ENERGY: MORE THAN HALF THE DAYS

## 2025-07-25 NOTE — PROGRESS NOTES
Have you been to the ER, urgent care clinic since your last visit?  Hospitalized since your last visit?   NO      Have you seen or consulted any other health care providers outside our system since your last visit?   Dr. Novoa       Chief Complaint   Patient presents with    Pre-op Exam     Spinal surgery 8/04  Dr. Novoa

## 2025-07-25 NOTE — PROGRESS NOTES
Subjective:      Priya Orlando is a 75 y.o. female who presents to the office today for a preoperative consultation at the request of surgeon Dr. Novoa who plans on performing Lumbar Laminectomy with fusion   on .   Planned anesthesia is General.  The patient has the following known anesthesia issues: no issues   Patient has a bleeding risk of : no recent abnormal bleeding  Patient does not have objection to receiving blood products if needed.  Past Medical History:   Diagnosis Date    Anxiety 10/01/24    Arthritis     Depression 10/01/24    Mild depression ongoing, but it has dramatically increased recently    Hypercholesterolemia     Microscopic colitis     Osteoarthritis     PONV (postoperative nausea and vomiting)     Sun-damaged skin     Sunburn, blistering      Patient Active Problem List    Diagnosis Date Noted    Colitis 2025    Spondylolisthesis of lumbar region 2025    Lumbar spinal stenosis 2025    Stenosis, spinal, lumbar 2025    Spondylolisthesis at L4-L5 level 2025    Osteopenia of multiple sites 2022    Basal cell carcinoma (BCC) of skin of nose 10/28/2022    PSVT (paroxysmal supraventricular tachycardia) 2022    Essential hypertension 2019    Primary insomnia 2019    Hyperlipidemia 2011    Arthritis 2011     Past Surgical History:   Procedure Laterality Date    CATARACT REMOVAL Bilateral      SECTION      COLONOSCOPY  3/7/2012/2017    repeat in 5 years due family hx    GYN          KNEE ARTHROSCOPY Right 2000    ORTHOPEDIC SURGERY      rotator cuff tear    SHOULDER SURGERY      Rotator cuff repair     Family History   Problem Relation Age of Onset    Hypertension Mother     Stroke Mother     Osteoporosis Mother     Cancer Father         leukemia    Anesth Problems Neg Hx      Social History     Socioeconomic History    Marital status:      Spouse name: None    Number of children:

## 2025-07-26 LAB
EKG ATRIAL RATE: 55 BPM
EKG DIAGNOSIS: NORMAL
EKG P AXIS: -3 DEGREES
EKG P-R INTERVAL: 176 MS
EKG Q-T INTERVAL: 456 MS
EKG QRS DURATION: 94 MS
EKG QTC CALCULATION (BAZETT): 436 MS
EKG R AXIS: -8 DEGREES
EKG T AXIS: 33 DEGREES
EKG VENTRICULAR RATE: 55 BPM

## 2025-07-26 PROCEDURE — 93010 ELECTROCARDIOGRAM REPORT: CPT | Performed by: SPECIALIST

## 2025-07-31 DIAGNOSIS — F41.9 ANXIETY: Primary | ICD-10-CM

## 2025-07-31 RX ORDER — HYDROXYZINE HYDROCHLORIDE 25 MG/1
25 TABLET, FILM COATED ORAL 2 TIMES DAILY PRN
Qty: 30 TABLET | Refills: 0 | Status: SHIPPED | OUTPATIENT
Start: 2025-07-31

## 2025-08-01 ENCOUNTER — TELEPHONE (OUTPATIENT)
Dept: PRIMARY CARE CLINIC | Facility: CLINIC | Age: 75
End: 2025-08-01

## 2025-08-01 NOTE — TELEPHONE ENCOUNTER
Patient is calling because she was told by her pharmacy a PA is needed for-  hydrOXYzine HCl (ATARAX) 25 MG tablet

## 2025-08-04 DIAGNOSIS — F32.89 OTHER DEPRESSION: ICD-10-CM

## 2025-08-04 DIAGNOSIS — E78.5 HYPERLIPIDEMIA, UNSPECIFIED HYPERLIPIDEMIA TYPE: ICD-10-CM

## 2025-08-04 RX ORDER — ATORVASTATIN CALCIUM 10 MG/1
10 TABLET, FILM COATED ORAL DAILY
Qty: 90 TABLET | Refills: 0 | Status: SHIPPED | OUTPATIENT
Start: 2025-08-04

## 2025-08-08 DIAGNOSIS — F41.9 ANXIETY: ICD-10-CM

## 2025-08-08 RX ORDER — HYDROXYZINE HYDROCHLORIDE 25 MG/1
TABLET, FILM COATED ORAL
Qty: 180 TABLET | Refills: 1 | OUTPATIENT
Start: 2025-08-08

## 2025-08-28 ENCOUNTER — CLINICAL DOCUMENTATION (OUTPATIENT)
Age: 75
End: 2025-08-28